# Patient Record
Sex: FEMALE | Race: BLACK OR AFRICAN AMERICAN | NOT HISPANIC OR LATINO | Employment: OTHER | URBAN - METROPOLITAN AREA
[De-identification: names, ages, dates, MRNs, and addresses within clinical notes are randomized per-mention and may not be internally consistent; named-entity substitution may affect disease eponyms.]

---

## 2017-11-03 ENCOUNTER — OFFICE VISIT (OUTPATIENT)
Dept: CARDIOLOGY | Facility: CLINIC | Age: 82
End: 2017-11-03
Payer: MEDICARE

## 2017-11-03 VITALS
WEIGHT: 122 LBS | SYSTOLIC BLOOD PRESSURE: 122 MMHG | HEIGHT: 60 IN | HEART RATE: 62 BPM | DIASTOLIC BLOOD PRESSURE: 72 MMHG | BODY MASS INDEX: 23.95 KG/M2

## 2017-11-03 DIAGNOSIS — I10 ESSENTIAL HYPERTENSION: Primary | ICD-10-CM

## 2017-11-03 DIAGNOSIS — E11.9 TYPE 2 DIABETES MELLITUS WITHOUT COMPLICATION, WITHOUT LONG-TERM CURRENT USE OF INSULIN: ICD-10-CM

## 2017-11-03 DIAGNOSIS — E78.5 HYPERLIPIDEMIA, UNSPECIFIED HYPERLIPIDEMIA TYPE: ICD-10-CM

## 2017-11-03 PROCEDURE — 99213 OFFICE O/P EST LOW 20 MIN: CPT | Mod: S$GLB,,, | Performed by: INTERNAL MEDICINE

## 2017-11-03 PROCEDURE — 93000 ELECTROCARDIOGRAM COMPLETE: CPT | Mod: S$GLB,,, | Performed by: INTERNAL MEDICINE

## 2017-11-03 RX ORDER — GLIPIZIDE 5 MG/1
5 TABLET, FILM COATED, EXTENDED RELEASE ORAL DAILY
Qty: 90 TABLET | Refills: 3 | Status: SHIPPED | OUTPATIENT
Start: 2017-11-03 | End: 2018-01-01

## 2017-11-03 RX ORDER — CARVEDILOL 25 MG/1
25 TABLET ORAL 2 TIMES DAILY WITH MEALS
Qty: 180 TABLET | Refills: 3 | Status: ON HOLD | OUTPATIENT
Start: 2017-11-03 | End: 2019-01-01 | Stop reason: HOSPADM

## 2017-11-03 NOTE — PROGRESS NOTES
Subjective:   Patient ID:  Deisi Cheung is a 82 y.o. female     Chief Complaint   Patient presents with    Follow-up     Hypertension       HPI: Dr Mena is PCP.  Pt is active and feels well. Sugars are good.     Review of Systems   Cardiovascular: Negative for chest pain, claudication, dyspnea on exertion, irregular heartbeat, leg swelling, near-syncope, orthopnea, palpitations and syncope.   No recurrent falls      Objective:   Physical Exam   Constitutional: She is oriented to person, place, and time. She appears well-developed and well-nourished. No distress.   HENT:   Head: Normocephalic.   Eyes: No scleral icterus.   Neck: No JVD present.   Cardiovascular: Normal rate and regular rhythm.  Exam reveals no gallop and no friction rub.    Murmur (Ii/VI systolic ) heard.  Pulmonary/Chest: Effort normal and breath sounds normal. No stridor.   Musculoskeletal: She exhibits no edema.   Neurological: She is alert and oriented to person, place, and time.   Skin: Skin is warm and dry. She is not diaphoretic.   Psychiatric: She has a normal mood and affect. Her behavior is normal. Judgment and thought content normal.   Vitals reviewed.    ECG: NSR, WNL  Assessment:     1. Essential hypertension    2. Hyperlipidemia, unspecified hyperlipidemia type    3. Type 2 diabetes mellitus without complication, without long-term current use of insulin        Plan:   Continue same medical regimen.  Return to clinic in 6 months with ECG.  F/u with Dr Mena

## 2018-01-01 ENCOUNTER — PATIENT MESSAGE (OUTPATIENT)
Dept: INTERNAL MEDICINE | Facility: CLINIC | Age: 83
End: 2018-01-01

## 2018-01-01 ENCOUNTER — HOSPITAL ENCOUNTER (OUTPATIENT)
Dept: RADIOLOGY | Facility: OTHER | Age: 83
Discharge: HOME | End: 2018-11-23
Attending: FAMILY MEDICINE
Payer: MEDICARE

## 2018-01-01 ENCOUNTER — TELEPHONE (OUTPATIENT)
Dept: INTERNAL MEDICINE | Facility: CLINIC | Age: 83
End: 2018-01-01

## 2018-01-01 ENCOUNTER — PATIENT MESSAGE (OUTPATIENT)
Dept: NEUROLOGY | Facility: CLINIC | Age: 83
End: 2018-01-01

## 2018-01-01 ENCOUNTER — OFFICE VISIT (OUTPATIENT)
Dept: ORTHOPEDICS | Facility: CLINIC | Age: 83
End: 2018-01-01
Attending: FAMILY MEDICINE
Payer: MEDICARE

## 2018-01-01 ENCOUNTER — APPOINTMENT (OUTPATIENT)
Dept: LAB | Facility: OTHER | Age: 83
End: 2018-01-01
Attending: FAMILY MEDICINE
Payer: MEDICARE

## 2018-01-01 ENCOUNTER — OFFICE VISIT (OUTPATIENT)
Dept: INTERNAL MEDICINE | Facility: CLINIC | Age: 83
End: 2018-01-01
Attending: FAMILY MEDICINE
Payer: MEDICARE

## 2018-01-01 ENCOUNTER — OUTPATIENT CASE MANAGEMENT (OUTPATIENT)
Dept: ADMINISTRATIVE | Facility: OTHER | Age: 83
End: 2018-01-01

## 2018-01-01 ENCOUNTER — LAB VISIT (OUTPATIENT)
Dept: LAB | Facility: HOSPITAL | Age: 83
End: 2018-01-01
Attending: FAMILY MEDICINE
Payer: MEDICARE

## 2018-01-01 ENCOUNTER — HOSPITAL ENCOUNTER (INPATIENT)
Facility: HOSPITAL | Age: 83
LOS: 32 days | Discharge: NURSING, REHAB OR LONG-TERM CARE | DRG: 948 | End: 2019-01-15
Attending: EMERGENCY MEDICINE | Admitting: INTERNAL MEDICINE
Payer: MEDICARE

## 2018-01-01 VITALS
WEIGHT: 123 LBS | BODY MASS INDEX: 24.15 KG/M2 | HEART RATE: 79 BPM | DIASTOLIC BLOOD PRESSURE: 82 MMHG | HEIGHT: 60 IN | SYSTOLIC BLOOD PRESSURE: 159 MMHG

## 2018-01-01 VITALS
DIASTOLIC BLOOD PRESSURE: 80 MMHG | WEIGHT: 123 LBS | HEART RATE: 76 BPM | OXYGEN SATURATION: 97 % | BODY MASS INDEX: 24.15 KG/M2 | HEIGHT: 60 IN | SYSTOLIC BLOOD PRESSURE: 142 MMHG

## 2018-01-01 DIAGNOSIS — M79.641 RIGHT HAND PAIN: ICD-10-CM

## 2018-01-01 DIAGNOSIS — I10 ESSENTIAL HYPERTENSION: ICD-10-CM

## 2018-01-01 DIAGNOSIS — R73.9 HYPERGLYCEMIA WITHOUT KETOSIS: ICD-10-CM

## 2018-01-01 DIAGNOSIS — M17.11 ARTHRITIS OF RIGHT KNEE: ICD-10-CM

## 2018-01-01 DIAGNOSIS — E11.9 TYPE 2 DIABETES MELLITUS WITHOUT COMPLICATION, WITHOUT LONG-TERM CURRENT USE OF INSULIN: Primary | ICD-10-CM

## 2018-01-01 DIAGNOSIS — R29.898 WEAKNESS OF BOTH LOWER EXTREMITIES: ICD-10-CM

## 2018-01-01 DIAGNOSIS — Z12.11 SCREEN FOR COLON CANCER: Primary | ICD-10-CM

## 2018-01-01 DIAGNOSIS — Z78.0 ASYMPTOMATIC POSTMENOPAUSAL STATE: ICD-10-CM

## 2018-01-01 DIAGNOSIS — B35.1 ONYCHOMYCOSIS: ICD-10-CM

## 2018-01-01 DIAGNOSIS — E11.9 TYPE 2 DIABETES MELLITUS WITHOUT COMPLICATION, WITHOUT LONG-TERM CURRENT USE OF INSULIN: ICD-10-CM

## 2018-01-01 DIAGNOSIS — D64.9 NORMOCYTIC ANEMIA: Primary | ICD-10-CM

## 2018-01-01 DIAGNOSIS — M25.511 CHRONIC RIGHT SHOULDER PAIN: ICD-10-CM

## 2018-01-01 DIAGNOSIS — D52.0 DIETARY FOLATE DEFICIENCY ANEMIA: ICD-10-CM

## 2018-01-01 DIAGNOSIS — E78.5 HYPERLIPIDEMIA, UNSPECIFIED HYPERLIPIDEMIA TYPE: ICD-10-CM

## 2018-01-01 DIAGNOSIS — K59.00 CONSTIPATION, UNSPECIFIED CONSTIPATION TYPE: ICD-10-CM

## 2018-01-01 DIAGNOSIS — Z12.11 SCREEN FOR COLON CANCER: ICD-10-CM

## 2018-01-01 DIAGNOSIS — E78.00 PURE HYPERCHOLESTEROLEMIA: ICD-10-CM

## 2018-01-01 DIAGNOSIS — R53.1 WEAKNESS: ICD-10-CM

## 2018-01-01 DIAGNOSIS — R60.0 EDEMA OF UPPER EXTREMITY: ICD-10-CM

## 2018-01-01 DIAGNOSIS — R26.9 GAIT DISTURBANCE: ICD-10-CM

## 2018-01-01 DIAGNOSIS — Z74.09 IMMOBILITY: ICD-10-CM

## 2018-01-01 DIAGNOSIS — R53.81 DEBILITY: ICD-10-CM

## 2018-01-01 DIAGNOSIS — G89.29 CHRONIC RIGHT SHOULDER PAIN: ICD-10-CM

## 2018-01-01 DIAGNOSIS — D51.9 ANEMIA DUE TO VITAMIN B12 DEFICIENCY, UNSPECIFIED B12 DEFICIENCY TYPE: ICD-10-CM

## 2018-01-01 DIAGNOSIS — M62.421 CONTRACTURE OF MUSCLE, RIGHT UPPER ARM: ICD-10-CM

## 2018-01-01 DIAGNOSIS — R29.898 WEAKNESS OF RIGHT UPPER EXTREMITY: Primary | ICD-10-CM

## 2018-01-01 DIAGNOSIS — R22.31 LOCALIZED SWELLING ON RIGHT HAND: Primary | ICD-10-CM

## 2018-01-01 LAB
ALBUMIN SERPL BCP-MCNC: 3 G/DL
ALP SERPL-CCNC: 77 U/L
ALT SERPL W/O P-5'-P-CCNC: 7 U/L
ANION GAP SERPL CALC-SCNC: 10 MMOL/L
ANION GAP SERPL CALC-SCNC: 10 MMOL/L
ANION GAP SERPL CALC-SCNC: 13 MMOL/L
ANION GAP SERPL CALC-SCNC: 7 MMOL/L
ANION GAP SERPL CALC-SCNC: 8 MMOL/L
ANION GAP SERPL CALC-SCNC: 8 MMOL/L
ANION GAP SERPL CALC-SCNC: 9 MMOL/L
AST SERPL-CCNC: 11 U/L
BASOPHILS # BLD AUTO: 0.03 K/UL
BASOPHILS # BLD AUTO: 0.04 K/UL
BASOPHILS # BLD AUTO: 0.04 K/UL
BASOPHILS # BLD AUTO: 0.05 K/UL
BASOPHILS # BLD AUTO: 0.06 K/UL
BASOPHILS NFR BLD: 0.4 %
BASOPHILS NFR BLD: 0.5 %
BASOPHILS NFR BLD: 0.5 %
BASOPHILS NFR BLD: 0.6 %
BASOPHILS NFR BLD: 0.8 %
BILIRUB SERPL-MCNC: 0.3 MG/DL
BILIRUB UR QL STRIP: NEGATIVE
BUN SERPL-MCNC: 13 MG/DL
BUN SERPL-MCNC: 14 MG/DL
BUN SERPL-MCNC: 15 MG/DL
BUN SERPL-MCNC: 16 MG/DL
BUN SERPL-MCNC: 17 MG/DL
BUN SERPL-MCNC: 19 MG/DL
BUN SERPL-MCNC: 21 MG/DL
CALCIUM SERPL-MCNC: 8.6 MG/DL
CALCIUM SERPL-MCNC: 8.8 MG/DL
CALCIUM SERPL-MCNC: 8.9 MG/DL
CALCIUM SERPL-MCNC: 8.9 MG/DL
CALCIUM SERPL-MCNC: 9 MG/DL
CALCIUM SERPL-MCNC: 9 MG/DL
CALCIUM SERPL-MCNC: 9.1 MG/DL
CALCIUM SERPL-MCNC: 9.2 MG/DL
CALCIUM SERPL-MCNC: 9.2 MG/DL
CHLORIDE SERPL-SCNC: 100 MMOL/L
CHLORIDE SERPL-SCNC: 101 MMOL/L
CHLORIDE SERPL-SCNC: 102 MMOL/L
CHLORIDE SERPL-SCNC: 103 MMOL/L
CHLORIDE SERPL-SCNC: 97 MMOL/L
CK SERPL-CCNC: 61 U/L
CLARITY UR REFRACT.AUTO: CLEAR
CO2 SERPL-SCNC: 24 MMOL/L
CO2 SERPL-SCNC: 24 MMOL/L
CO2 SERPL-SCNC: 25 MMOL/L
CO2 SERPL-SCNC: 26 MMOL/L
CO2 SERPL-SCNC: 26 MMOL/L
CO2 SERPL-SCNC: 27 MMOL/L
CO2 SERPL-SCNC: 28 MMOL/L
COLOR UR AUTO: YELLOW
CREAT SERPL-MCNC: 0.5 MG/DL
CREAT SERPL-MCNC: 0.6 MG/DL
CREAT SERPL-MCNC: 0.7 MG/DL
CRP SERPL-MCNC: 12.3 MG/L
DIFFERENTIAL METHOD: ABNORMAL
EOSINOPHIL # BLD AUTO: 0.1 K/UL
EOSINOPHIL # BLD AUTO: 0.2 K/UL
EOSINOPHIL NFR BLD: 1.8 %
EOSINOPHIL NFR BLD: 2.1 %
EOSINOPHIL NFR BLD: 2.3 %
EOSINOPHIL NFR BLD: 2.4 %
EOSINOPHIL NFR BLD: 2.5 %
EOSINOPHIL NFR BLD: 2.5 %
EOSINOPHIL NFR BLD: 2.6 %
EOSINOPHIL NFR BLD: 2.7 %
EOSINOPHIL NFR BLD: 3 %
ERYTHROCYTE [DISTWIDTH] IN BLOOD BY AUTOMATED COUNT: 15.7 %
ERYTHROCYTE [DISTWIDTH] IN BLOOD BY AUTOMATED COUNT: 15.9 %
ERYTHROCYTE [DISTWIDTH] IN BLOOD BY AUTOMATED COUNT: 16.1 %
EST. GFR  (AFRICAN AMERICAN): >60 ML/MIN/1.73 M^2
EST. GFR  (NON AFRICAN AMERICAN): >60 ML/MIN/1.73 M^2
FOLATE SERPL-MCNC: 10.6 NG/ML
GLUCOSE SERPL-MCNC: 115 MG/DL
GLUCOSE SERPL-MCNC: 124 MG/DL
GLUCOSE SERPL-MCNC: 134 MG/DL
GLUCOSE SERPL-MCNC: 135 MG/DL
GLUCOSE SERPL-MCNC: 137 MG/DL
GLUCOSE SERPL-MCNC: 143 MG/DL
GLUCOSE SERPL-MCNC: 165 MG/DL
GLUCOSE SERPL-MCNC: 193 MG/DL
GLUCOSE SERPL-MCNC: 84 MG/DL
GLUCOSE UR QL STRIP: NEGATIVE
HCT VFR BLD AUTO: 31.2 %
HCT VFR BLD AUTO: 31.3 %
HCT VFR BLD AUTO: 31.5 %
HCT VFR BLD AUTO: 31.6 %
HCT VFR BLD AUTO: 32.2 %
HCT VFR BLD AUTO: 33.5 %
HCT VFR BLD AUTO: 33.8 %
HCT VFR BLD AUTO: 34.3 %
HCT VFR BLD AUTO: 34.8 %
HEMOCCULT STL QL IA: NEGATIVE
HGB BLD-MCNC: 10 G/DL
HGB BLD-MCNC: 10.1 G/DL
HGB BLD-MCNC: 10.3 G/DL
HGB BLD-MCNC: 10.4 G/DL
HGB BLD-MCNC: 10.8 G/DL
HGB BLD-MCNC: 10.9 G/DL
HGB BLD-MCNC: 9.8 G/DL
HGB BLD-MCNC: 9.9 G/DL
HGB BLD-MCNC: 9.9 G/DL
HGB UR QL STRIP: NEGATIVE
IMM GRANULOCYTES # BLD AUTO: 0.04 K/UL
IMM GRANULOCYTES # BLD AUTO: 0.05 K/UL
IMM GRANULOCYTES # BLD AUTO: 0.06 K/UL
IMM GRANULOCYTES # BLD AUTO: 0.1 K/UL
IMM GRANULOCYTES NFR BLD AUTO: 0.5 %
IMM GRANULOCYTES NFR BLD AUTO: 0.6 %
IMM GRANULOCYTES NFR BLD AUTO: 0.7 %
IMM GRANULOCYTES NFR BLD AUTO: 0.7 %
IMM GRANULOCYTES NFR BLD AUTO: 0.8 %
IMM GRANULOCYTES NFR BLD AUTO: 0.8 %
IMM GRANULOCYTES NFR BLD AUTO: 1.3 %
KETONES UR QL STRIP: NEGATIVE
LEUKOCYTE ESTERASE UR QL STRIP: NEGATIVE
LYMPHOCYTES # BLD AUTO: 2 K/UL
LYMPHOCYTES # BLD AUTO: 2.4 K/UL
LYMPHOCYTES # BLD AUTO: 2.5 K/UL
LYMPHOCYTES # BLD AUTO: 2.5 K/UL
LYMPHOCYTES # BLD AUTO: 2.6 K/UL
LYMPHOCYTES # BLD AUTO: 2.7 K/UL
LYMPHOCYTES # BLD AUTO: 2.8 K/UL
LYMPHOCYTES NFR BLD: 25.7 %
LYMPHOCYTES NFR BLD: 29.5 %
LYMPHOCYTES NFR BLD: 30.9 %
LYMPHOCYTES NFR BLD: 31.9 %
LYMPHOCYTES NFR BLD: 32.8 %
LYMPHOCYTES NFR BLD: 33.9 %
LYMPHOCYTES NFR BLD: 34 %
LYMPHOCYTES NFR BLD: 35.4 %
LYMPHOCYTES NFR BLD: 35.4 %
MAGNESIUM SERPL-MCNC: 1.6 MG/DL
MAGNESIUM SERPL-MCNC: 1.6 MG/DL
MAGNESIUM SERPL-MCNC: 1.7 MG/DL
MAGNESIUM SERPL-MCNC: 1.7 MG/DL
MAGNESIUM SERPL-MCNC: 1.8 MG/DL
MCH RBC QN AUTO: 26.5 PG
MCH RBC QN AUTO: 26.7 PG
MCH RBC QN AUTO: 27 PG
MCH RBC QN AUTO: 27.1 PG
MCH RBC QN AUTO: 27.2 PG
MCH RBC QN AUTO: 27.3 PG
MCH RBC QN AUTO: 27.5 PG
MCH RBC QN AUTO: 27.5 PG
MCH RBC QN AUTO: 27.9 PG
MCHC RBC AUTO-ENTMCNC: 30.3 G/DL
MCHC RBC AUTO-ENTMCNC: 30.7 G/DL
MCHC RBC AUTO-ENTMCNC: 30.7 G/DL
MCHC RBC AUTO-ENTMCNC: 31.3 G/DL
MCHC RBC AUTO-ENTMCNC: 31.3 G/DL
MCHC RBC AUTO-ENTMCNC: 31.4 G/DL
MCHC RBC AUTO-ENTMCNC: 31.9 G/DL
MCHC RBC AUTO-ENTMCNC: 32 G/DL
MCHC RBC AUTO-ENTMCNC: 32.1 G/DL
MCV RBC AUTO: 84 FL
MCV RBC AUTO: 85 FL
MCV RBC AUTO: 86 FL
MCV RBC AUTO: 86 FL
MCV RBC AUTO: 87 FL
MCV RBC AUTO: 88 FL
MCV RBC AUTO: 89 FL
METHYLMALONATE SERPL-SCNC: 0.15 UMOL/L
MONOCYTES # BLD AUTO: 0.6 K/UL
MONOCYTES # BLD AUTO: 0.6 K/UL
MONOCYTES # BLD AUTO: 0.7 K/UL
MONOCYTES # BLD AUTO: 0.8 K/UL
MONOCYTES NFR BLD: 10 %
MONOCYTES NFR BLD: 10.3 %
MONOCYTES NFR BLD: 10.7 %
MONOCYTES NFR BLD: 7.9 %
MONOCYTES NFR BLD: 9.1 %
MONOCYTES NFR BLD: 9.6 %
MONOCYTES NFR BLD: 9.6 %
MONOCYTES NFR BLD: 9.8 %
MONOCYTES NFR BLD: 9.8 %
NEUTROPHILS # BLD AUTO: 3.4 K/UL
NEUTROPHILS # BLD AUTO: 3.7 K/UL
NEUTROPHILS # BLD AUTO: 4 K/UL
NEUTROPHILS # BLD AUTO: 4 K/UL
NEUTROPHILS # BLD AUTO: 4.1 K/UL
NEUTROPHILS # BLD AUTO: 4.5 K/UL
NEUTROPHILS # BLD AUTO: 4.5 K/UL
NEUTROPHILS # BLD AUTO: 4.7 K/UL
NEUTROPHILS # BLD AUTO: 4.9 K/UL
NEUTROPHILS NFR BLD: 50 %
NEUTROPHILS NFR BLD: 51.5 %
NEUTROPHILS NFR BLD: 52.2 %
NEUTROPHILS NFR BLD: 52.7 %
NEUTROPHILS NFR BLD: 52.8 %
NEUTROPHILS NFR BLD: 54.6 %
NEUTROPHILS NFR BLD: 55.6 %
NEUTROPHILS NFR BLD: 57.7 %
NEUTROPHILS NFR BLD: 63.7 %
NITRITE UR QL STRIP: NEGATIVE
NRBC BLD-RTO: 0 /100 WBC
PH UR STRIP: 7 [PH] (ref 5–8)
PHOSPHATE SERPL-MCNC: 2.8 MG/DL
PHOSPHATE SERPL-MCNC: 2.9 MG/DL
PHOSPHATE SERPL-MCNC: 3.1 MG/DL
PHOSPHATE SERPL-MCNC: 3.2 MG/DL
PHOSPHATE SERPL-MCNC: 3.6 MG/DL
PLATELET # BLD AUTO: 330 K/UL
PLATELET # BLD AUTO: 357 K/UL
PLATELET # BLD AUTO: 363 K/UL
PLATELET # BLD AUTO: 365 K/UL
PLATELET # BLD AUTO: 367 K/UL
PLATELET # BLD AUTO: 369 K/UL
PLATELET # BLD AUTO: 378 K/UL
PLATELET # BLD AUTO: 379 K/UL
PLATELET # BLD AUTO: 404 K/UL
PMV BLD AUTO: 10.1 FL
PMV BLD AUTO: 10.1 FL
PMV BLD AUTO: 10.3 FL
PMV BLD AUTO: 10.6 FL
PMV BLD AUTO: 10.7 FL
PMV BLD AUTO: 11 FL
POCT GLUCOSE: 105 MG/DL (ref 70–110)
POCT GLUCOSE: 106 MG/DL (ref 70–110)
POCT GLUCOSE: 109 MG/DL (ref 70–110)
POCT GLUCOSE: 112 MG/DL (ref 70–110)
POCT GLUCOSE: 116 MG/DL (ref 70–110)
POCT GLUCOSE: 118 MG/DL (ref 70–110)
POCT GLUCOSE: 122 MG/DL (ref 70–110)
POCT GLUCOSE: 123 MG/DL (ref 70–110)
POCT GLUCOSE: 126 MG/DL (ref 70–110)
POCT GLUCOSE: 128 MG/DL (ref 70–110)
POCT GLUCOSE: 129 MG/DL (ref 70–110)
POCT GLUCOSE: 132 MG/DL (ref 70–110)
POCT GLUCOSE: 132 MG/DL (ref 70–110)
POCT GLUCOSE: 133 MG/DL (ref 70–110)
POCT GLUCOSE: 134 MG/DL (ref 70–110)
POCT GLUCOSE: 135 MG/DL (ref 70–110)
POCT GLUCOSE: 138 MG/DL (ref 70–110)
POCT GLUCOSE: 139 MG/DL (ref 70–110)
POCT GLUCOSE: 140 MG/DL (ref 70–110)
POCT GLUCOSE: 144 MG/DL (ref 70–110)
POCT GLUCOSE: 147 MG/DL (ref 70–110)
POCT GLUCOSE: 147 MG/DL (ref 70–110)
POCT GLUCOSE: 148 MG/DL (ref 70–110)
POCT GLUCOSE: 150 MG/DL (ref 70–110)
POCT GLUCOSE: 151 MG/DL (ref 70–110)
POCT GLUCOSE: 151 MG/DL (ref 70–110)
POCT GLUCOSE: 152 MG/DL (ref 70–110)
POCT GLUCOSE: 154 MG/DL (ref 70–110)
POCT GLUCOSE: 155 MG/DL (ref 70–110)
POCT GLUCOSE: 158 MG/DL (ref 70–110)
POCT GLUCOSE: 162 MG/DL (ref 70–110)
POCT GLUCOSE: 163 MG/DL (ref 70–110)
POCT GLUCOSE: 167 MG/DL (ref 70–110)
POCT GLUCOSE: 167 MG/DL (ref 70–110)
POCT GLUCOSE: 172 MG/DL (ref 70–110)
POCT GLUCOSE: 175 MG/DL (ref 70–110)
POCT GLUCOSE: 178 MG/DL (ref 70–110)
POCT GLUCOSE: 180 MG/DL (ref 70–110)
POCT GLUCOSE: 184 MG/DL (ref 70–110)
POCT GLUCOSE: 188 MG/DL (ref 70–110)
POCT GLUCOSE: 200 MG/DL (ref 70–110)
POCT GLUCOSE: 200 MG/DL (ref 70–110)
POCT GLUCOSE: 202 MG/DL (ref 70–110)
POCT GLUCOSE: 207 MG/DL (ref 70–110)
POCT GLUCOSE: 208 MG/DL (ref 70–110)
POCT GLUCOSE: 212 MG/DL (ref 70–110)
POCT GLUCOSE: 215 MG/DL (ref 70–110)
POCT GLUCOSE: 221 MG/DL (ref 70–110)
POCT GLUCOSE: 221 MG/DL (ref 70–110)
POCT GLUCOSE: 230 MG/DL (ref 70–110)
POCT GLUCOSE: 234 MG/DL (ref 70–110)
POCT GLUCOSE: 244 MG/DL (ref 70–110)
POCT GLUCOSE: 250 MG/DL (ref 70–110)
POCT GLUCOSE: 252 MG/DL (ref 70–110)
POCT GLUCOSE: 259 MG/DL (ref 70–110)
POCT GLUCOSE: 262 MG/DL (ref 70–110)
POCT GLUCOSE: 269 MG/DL (ref 70–110)
POCT GLUCOSE: 314 MG/DL (ref 70–110)
POCT GLUCOSE: 326 MG/DL (ref 70–110)
POCT GLUCOSE: 59 MG/DL (ref 70–110)
POCT GLUCOSE: 61 MG/DL (ref 70–110)
POCT GLUCOSE: 97 MG/DL (ref 70–110)
POCT GLUCOSE: 99 MG/DL (ref 70–110)
POTASSIUM SERPL-SCNC: 3.3 MMOL/L
POTASSIUM SERPL-SCNC: 3.6 MMOL/L
POTASSIUM SERPL-SCNC: 3.7 MMOL/L
POTASSIUM SERPL-SCNC: 3.8 MMOL/L
POTASSIUM SERPL-SCNC: 3.9 MMOL/L
POTASSIUM SERPL-SCNC: 4.2 MMOL/L
PROT SERPL-MCNC: 6.8 G/DL
PROT UR QL STRIP: NEGATIVE
RBC # BLD AUTO: 3.6 M/UL
RBC # BLD AUTO: 3.61 M/UL
RBC # BLD AUTO: 3.62 M/UL
RBC # BLD AUTO: 3.62 M/UL
RBC # BLD AUTO: 3.71 M/UL
RBC # BLD AUTO: 3.88 M/UL
RBC # BLD AUTO: 3.9 M/UL
RBC # BLD AUTO: 3.97 M/UL
RBC # BLD AUTO: 3.97 M/UL
SODIUM SERPL-SCNC: 134 MMOL/L
SODIUM SERPL-SCNC: 135 MMOL/L
SODIUM SERPL-SCNC: 136 MMOL/L
SODIUM SERPL-SCNC: 137 MMOL/L
SP GR UR STRIP: 1.01 (ref 1–1.03)
TB INDURATION 48 - 72 HR READ: 0 MM
URN SPEC COLLECT METH UR: NORMAL
VIT B12 SERPL-MCNC: 1062 PG/ML
WBC # BLD AUTO: 6.69 K/UL
WBC # BLD AUTO: 7.01 K/UL
WBC # BLD AUTO: 7.53 K/UL
WBC # BLD AUTO: 7.64 K/UL
WBC # BLD AUTO: 7.74 K/UL
WBC # BLD AUTO: 7.93 K/UL
WBC # BLD AUTO: 8.09 K/UL
WBC # BLD AUTO: 8.21 K/UL
WBC # BLD AUTO: 8.32 K/UL

## 2018-01-01 PROCEDURE — 86140 C-REACTIVE PROTEIN: CPT

## 2018-01-01 PROCEDURE — 36415 COLL VENOUS BLD VENIPUNCTURE: CPT

## 2018-01-01 PROCEDURE — G8989 SELF CARE D/C STATUS: HCPCS | Mod: CM

## 2018-01-01 PROCEDURE — 73030 X-RAY EXAM OF SHOULDER: CPT | Mod: 26,RT,, | Performed by: INTERNAL MEDICINE

## 2018-01-01 PROCEDURE — 84100 ASSAY OF PHOSPHORUS: CPT

## 2018-01-01 PROCEDURE — 3079F DIAST BP 80-89 MM HG: CPT | Mod: CPTII,S$GLB,, | Performed by: PHYSICIAN ASSISTANT

## 2018-01-01 PROCEDURE — G0378 HOSPITAL OBSERVATION PER HR: HCPCS

## 2018-01-01 PROCEDURE — 63600175 PHARM REV CODE 636 W HCPCS: Performed by: INTERNAL MEDICINE

## 2018-01-01 PROCEDURE — 99225 PR SUBSEQUENT OBSERVATION CARE,LEVEL II: CPT | Mod: ,,, | Performed by: HOSPITALIST

## 2018-01-01 PROCEDURE — 11000001 HC ACUTE MED/SURG PRIVATE ROOM

## 2018-01-01 PROCEDURE — 85025 COMPLETE CBC W/AUTO DIFF WBC: CPT

## 2018-01-01 PROCEDURE — 77080 DXA BONE DENSITY AXIAL: CPT | Mod: TC

## 2018-01-01 PROCEDURE — 82607 VITAMIN B-12: CPT

## 2018-01-01 PROCEDURE — 25000003 PHARM REV CODE 250: Performed by: INTERNAL MEDICINE

## 2018-01-01 PROCEDURE — S5571 INSULIN DISPOS PEN 3 ML: HCPCS | Performed by: INTERNAL MEDICINE

## 2018-01-01 PROCEDURE — S5571 INSULIN DISPOS PEN 3 ML: HCPCS | Performed by: PHYSICIAN ASSISTANT

## 2018-01-01 PROCEDURE — G8978 MOBILITY CURRENT STATUS: HCPCS | Mod: CL

## 2018-01-01 PROCEDURE — 82962 GLUCOSE BLOOD TEST: CPT

## 2018-01-01 PROCEDURE — 25000003 PHARM REV CODE 250: Performed by: PHYSICIAN ASSISTANT

## 2018-01-01 PROCEDURE — 99225 PR SUBSEQUENT OBSERVATION CARE,LEVEL II: ICD-10-PCS | Mod: ,,, | Performed by: HOSPITALIST

## 2018-01-01 PROCEDURE — 99226 PR SUBSEQUENT OBSERVATION CARE,LEVEL III: CPT | Mod: ,,, | Performed by: PHYSICIAN ASSISTANT

## 2018-01-01 PROCEDURE — 80048 BASIC METABOLIC PNL TOTAL CA: CPT

## 2018-01-01 PROCEDURE — 86580 TB INTRADERMAL TEST: CPT | Performed by: INTERNAL MEDICINE

## 2018-01-01 PROCEDURE — 73130 X-RAY EXAM OF HAND: CPT | Mod: 26,RT,, | Performed by: RADIOLOGY

## 2018-01-01 PROCEDURE — 99214 PR OFFICE/OUTPT VISIT, EST, LEVL IV, 30-39 MIN: ICD-10-PCS | Mod: ,,, | Performed by: PSYCHIATRY & NEUROLOGY

## 2018-01-01 PROCEDURE — G8980 MOBILITY D/C STATUS: HCPCS | Mod: CL

## 2018-01-01 PROCEDURE — G8987 SELF CARE CURRENT STATUS: HCPCS | Mod: CL

## 2018-01-01 PROCEDURE — 25500020 PHARM REV CODE 255: Performed by: INTERNAL MEDICINE

## 2018-01-01 PROCEDURE — 99225 PR SUBSEQUENT OBSERVATION CARE,LEVEL II: ICD-10-PCS | Mod: ,,, | Performed by: INTERNAL MEDICINE

## 2018-01-01 PROCEDURE — 97530 THERAPEUTIC ACTIVITIES: CPT

## 2018-01-01 PROCEDURE — 99214 OFFICE O/P EST MOD 30 MIN: CPT | Mod: ,,, | Performed by: PSYCHIATRY & NEUROLOGY

## 2018-01-01 PROCEDURE — 99284 EMERGENCY DEPT VISIT MOD MDM: CPT | Mod: ,,, | Performed by: EMERGENCY MEDICINE

## 2018-01-01 PROCEDURE — 99224 PR SUBSEQUENT OBSERVATION CARE,LEVEL I: ICD-10-PCS | Mod: ,,, | Performed by: INTERNAL MEDICINE

## 2018-01-01 PROCEDURE — 3288F FALL RISK ASSESSMENT DOCD: CPT | Mod: CPTII,S$GLB,, | Performed by: FAMILY MEDICINE

## 2018-01-01 PROCEDURE — 99224 PR SUBSEQUENT OBSERVATION CARE,LEVEL I: CPT | Mod: ,,, | Performed by: INTERNAL MEDICINE

## 2018-01-01 PROCEDURE — 97535 SELF CARE MNGMENT TRAINING: CPT

## 2018-01-01 PROCEDURE — 73130 X-RAY EXAM OF HAND: CPT | Mod: TC,FY,RT

## 2018-01-01 PROCEDURE — G8979 MOBILITY GOAL STATUS: HCPCS | Mod: CL

## 2018-01-01 PROCEDURE — 86580 TB INTRADERMAL TEST: CPT | Performed by: HOSPITALIST

## 2018-01-01 PROCEDURE — 73030 X-RAY EXAM OF SHOULDER: CPT | Mod: TC,FY,RT

## 2018-01-01 PROCEDURE — 99226 PR SUBSEQUENT OBSERVATION CARE,LEVEL III: ICD-10-PCS | Mod: ,,, | Performed by: PHYSICIAN ASSISTANT

## 2018-01-01 PROCEDURE — 99284 PR EMERGENCY DEPT VISIT,LEVEL IV: ICD-10-PCS | Mod: ,,, | Performed by: EMERGENCY MEDICINE

## 2018-01-01 PROCEDURE — 99222 PR INITIAL HOSPITAL CARE,LEVL II: ICD-10-PCS | Mod: ,,, | Performed by: NURSE PRACTITIONER

## 2018-01-01 PROCEDURE — 97166 OT EVAL MOD COMPLEX 45 MIN: CPT

## 2018-01-01 PROCEDURE — 83921 ORGANIC ACID SINGLE QUANT: CPT

## 2018-01-01 PROCEDURE — 99232 SBSQ HOSP IP/OBS MODERATE 35: CPT | Mod: ,,, | Performed by: NURSE PRACTITIONER

## 2018-01-01 PROCEDURE — 83735 ASSAY OF MAGNESIUM: CPT

## 2018-01-01 PROCEDURE — G8988 SELF CARE GOAL STATUS: HCPCS | Mod: CK

## 2018-01-01 PROCEDURE — 63600175 PHARM REV CODE 636 W HCPCS: Performed by: PHYSICIAN ASSISTANT

## 2018-01-01 PROCEDURE — 99219 PR INITIAL OBSERVATION CARE,LEVL II: CPT | Mod: ,,, | Performed by: INTERNAL MEDICINE

## 2018-01-01 PROCEDURE — 82274 ASSAY TEST FOR BLOOD FECAL: CPT

## 2018-01-01 PROCEDURE — 1100F PTFALLS ASSESS-DOCD GE2>/YR: CPT | Mod: CPTII,S$GLB,, | Performed by: FAMILY MEDICINE

## 2018-01-01 PROCEDURE — 81003 URINALYSIS AUTO W/O SCOPE: CPT

## 2018-01-01 PROCEDURE — 97164 PT RE-EVAL EST PLAN CARE: CPT

## 2018-01-01 PROCEDURE — 99222 1ST HOSP IP/OBS MODERATE 55: CPT | Mod: ,,, | Performed by: NURSE PRACTITIONER

## 2018-01-01 PROCEDURE — 99204 OFFICE O/P NEW MOD 45 MIN: CPT | Mod: S$GLB,,, | Performed by: PHYSICIAN ASSISTANT

## 2018-01-01 PROCEDURE — 99225 PR SUBSEQUENT OBSERVATION CARE,LEVEL II: CPT | Mod: ,,, | Performed by: INTERNAL MEDICINE

## 2018-01-01 PROCEDURE — 99232 PR SUBSEQUENT HOSPITAL CARE,LEVL II: ICD-10-PCS | Mod: ,,, | Performed by: NURSE PRACTITIONER

## 2018-01-01 PROCEDURE — 99219 PR INITIAL OBSERVATION CARE,LEVL II: ICD-10-PCS | Mod: ,,, | Performed by: INTERNAL MEDICINE

## 2018-01-01 PROCEDURE — 99999 PR PBB SHADOW E&M-EST. PATIENT-LVL III: CPT | Mod: PBBFAC,,, | Performed by: PHYSICIAN ASSISTANT

## 2018-01-01 PROCEDURE — 80053 COMPREHEN METABOLIC PANEL: CPT

## 2018-01-01 PROCEDURE — 82550 ASSAY OF CK (CPK): CPT

## 2018-01-01 PROCEDURE — 3079F DIAST BP 80-89 MM HG: CPT | Mod: CPTII,S$GLB,, | Performed by: FAMILY MEDICINE

## 2018-01-01 PROCEDURE — 3077F SYST BP >= 140 MM HG: CPT | Mod: CPTII,S$GLB,, | Performed by: FAMILY MEDICINE

## 2018-01-01 PROCEDURE — 99220 PR INITIAL OBSERVATION CARE,LEVL III: ICD-10-PCS | Mod: GC,,, | Performed by: PSYCHIATRY & NEUROLOGY

## 2018-01-01 PROCEDURE — A9585 GADOBUTROL INJECTION: HCPCS | Performed by: INTERNAL MEDICINE

## 2018-01-01 PROCEDURE — 63600175 PHARM REV CODE 636 W HCPCS: Performed by: HOSPITALIST

## 2018-01-01 PROCEDURE — 82746 ASSAY OF FOLIC ACID SERUM: CPT

## 2018-01-01 PROCEDURE — 99285 EMERGENCY DEPT VISIT HI MDM: CPT | Mod: 25

## 2018-01-01 PROCEDURE — 3077F SYST BP >= 140 MM HG: CPT | Mod: CPTII,S$GLB,, | Performed by: PHYSICIAN ASSISTANT

## 2018-01-01 PROCEDURE — 1101F PT FALLS ASSESS-DOCD LE1/YR: CPT | Mod: CPTII,S$GLB,, | Performed by: PHYSICIAN ASSISTANT

## 2018-01-01 PROCEDURE — 99204 OFFICE O/P NEW MOD 45 MIN: CPT | Mod: S$GLB,,, | Performed by: FAMILY MEDICINE

## 2018-01-01 PROCEDURE — 77080 DXA BONE DENSITY AXIAL: CPT | Mod: 26,,, | Performed by: INTERNAL MEDICINE

## 2018-01-01 PROCEDURE — 99220 PR INITIAL OBSERVATION CARE,LEVL III: CPT | Mod: GC,,, | Performed by: PSYCHIATRY & NEUROLOGY

## 2018-01-01 PROCEDURE — 97161 PT EVAL LOW COMPLEX 20 MIN: CPT

## 2018-01-01 PROCEDURE — 99999 PR PBB SHADOW E&M-EST. PATIENT-LVL V: CPT | Mod: PBBFAC,,, | Performed by: FAMILY MEDICINE

## 2018-01-01 RX ORDER — AMLODIPINE BESYLATE 10 MG/1
10 TABLET ORAL DAILY
Start: 2018-01-01 | End: 2018-01-01 | Stop reason: HOSPADM

## 2018-01-01 RX ORDER — HYDROCODONE BITARTRATE AND ACETAMINOPHEN 5; 325 MG/1; MG/1
1 TABLET ORAL EVERY 4 HOURS PRN
Qty: 30 TABLET | Refills: 0 | Status: SHIPPED | OUTPATIENT
Start: 2018-01-01 | End: 2019-01-01 | Stop reason: HOSPADM

## 2018-01-01 RX ORDER — CAPSAICIN 0.03 G/100G
CREAM TOPICAL 4 TIMES DAILY PRN
Status: DISCONTINUED | OUTPATIENT
Start: 2018-01-01 | End: 2019-01-01 | Stop reason: HOSPADM

## 2018-01-01 RX ORDER — INSULIN ASPART 100 [IU]/ML
4 INJECTION, SOLUTION INTRAVENOUS; SUBCUTANEOUS
Status: DISCONTINUED | OUTPATIENT
Start: 2018-01-01 | End: 2019-01-01 | Stop reason: HOSPADM

## 2018-01-01 RX ORDER — HYDRALAZINE HYDROCHLORIDE 25 MG/1
25 TABLET, FILM COATED ORAL EVERY 8 HOURS PRN
Status: DISCONTINUED | OUTPATIENT
Start: 2018-01-01 | End: 2019-01-01 | Stop reason: HOSPADM

## 2018-01-01 RX ORDER — INSULIN ASPART 100 [IU]/ML
0-5 INJECTION, SOLUTION INTRAVENOUS; SUBCUTANEOUS
Status: DISCONTINUED | OUTPATIENT
Start: 2018-01-01 | End: 2019-01-01 | Stop reason: HOSPADM

## 2018-01-01 RX ORDER — CAPSAICIN 0.03 G/100G
CREAM TOPICAL 4 TIMES DAILY PRN
Refills: 0 | COMMUNITY
Start: 2018-01-01 | End: 2019-01-01 | Stop reason: HOSPADM

## 2018-01-01 RX ORDER — GADOBUTROL 604.72 MG/ML
5 INJECTION INTRAVENOUS
Status: COMPLETED | OUTPATIENT
Start: 2018-01-01 | End: 2018-01-01

## 2018-01-01 RX ORDER — HYDROCODONE BITARTRATE AND ACETAMINOPHEN 5; 325 MG/1; MG/1
1 TABLET ORAL EVERY 6 HOURS PRN
Status: DISCONTINUED | OUTPATIENT
Start: 2018-01-01 | End: 2019-01-01

## 2018-01-01 RX ORDER — ACETAMINOPHEN 325 MG/1
650 TABLET ORAL EVERY 4 HOURS PRN
Status: DISCONTINUED | OUTPATIENT
Start: 2018-01-01 | End: 2019-01-01 | Stop reason: HOSPADM

## 2018-01-01 RX ORDER — LISINOPRIL 20 MG/1
40 TABLET ORAL DAILY
Status: DISCONTINUED | OUTPATIENT
Start: 2018-01-01 | End: 2019-01-01 | Stop reason: HOSPADM

## 2018-01-01 RX ORDER — GLUCAGON 1 MG
1 KIT INJECTION
Status: DISCONTINUED | OUTPATIENT
Start: 2018-01-01 | End: 2019-01-01 | Stop reason: HOSPADM

## 2018-01-01 RX ORDER — INSULIN ASPART 100 [IU]/ML
0-5 INJECTION, SOLUTION INTRAVENOUS; SUBCUTANEOUS
Refills: 0
Start: 2018-01-01 | End: 2019-01-01 | Stop reason: HOSPADM

## 2018-01-01 RX ORDER — CARVEDILOL 25 MG/1
25 TABLET ORAL 2 TIMES DAILY WITH MEALS
Status: DISCONTINUED | OUTPATIENT
Start: 2018-01-01 | End: 2019-01-01 | Stop reason: HOSPADM

## 2018-01-01 RX ORDER — ALENDRONATE SODIUM 70 MG/1
70 TABLET ORAL
Qty: 4 TABLET | Refills: 11 | Status: ON HOLD | OUTPATIENT
Start: 2018-01-01 | End: 2019-01-01 | Stop reason: HOSPADM

## 2018-01-01 RX ORDER — GLUCAGON 1 MG
1 KIT INJECTION
Status: DISCONTINUED | OUTPATIENT
Start: 2018-01-01 | End: 2018-01-01

## 2018-01-01 RX ORDER — LANOLIN ALCOHOL/MO/W.PET/CERES
400 CREAM (GRAM) TOPICAL DAILY
Status: ON HOLD | COMMUNITY
End: 2019-01-01 | Stop reason: HOSPADM

## 2018-01-01 RX ORDER — INSULIN ASPART 100 [IU]/ML
2 INJECTION, SOLUTION INTRAVENOUS; SUBCUTANEOUS
Status: DISCONTINUED | OUTPATIENT
Start: 2018-01-01 | End: 2018-01-01

## 2018-01-01 RX ORDER — ENOXAPARIN SODIUM 100 MG/ML
40 INJECTION SUBCUTANEOUS EVERY 24 HOURS
Status: DISCONTINUED | OUTPATIENT
Start: 2018-01-01 | End: 2019-01-01 | Stop reason: HOSPADM

## 2018-01-01 RX ORDER — ASPIRIN 81 MG/1
81 TABLET ORAL DAILY
Status: DISCONTINUED | OUTPATIENT
Start: 2018-01-01 | End: 2018-01-01

## 2018-01-01 RX ORDER — LANOLIN ALCOHOL/MO/W.PET/CERES
1000 CREAM (GRAM) TOPICAL DAILY
Status: ON HOLD | COMMUNITY
Start: 2018-01-01 | End: 2019-01-01 | Stop reason: HOSPADM

## 2018-01-01 RX ORDER — IBUPROFEN 200 MG
16 TABLET ORAL
Status: DISCONTINUED | OUTPATIENT
Start: 2018-01-01 | End: 2019-01-01 | Stop reason: HOSPADM

## 2018-01-01 RX ORDER — IBUPROFEN 200 MG
24 TABLET ORAL
Status: DISCONTINUED | OUTPATIENT
Start: 2018-01-01 | End: 2018-01-01

## 2018-01-01 RX ORDER — IBUPROFEN 200 MG
16 TABLET ORAL
Status: DISCONTINUED | OUTPATIENT
Start: 2018-01-01 | End: 2018-01-01

## 2018-01-01 RX ORDER — ENOXAPARIN SODIUM 100 MG/ML
40 INJECTION SUBCUTANEOUS EVERY 24 HOURS
Status: DISCONTINUED | OUTPATIENT
Start: 2018-01-01 | End: 2018-01-01

## 2018-01-01 RX ORDER — HYDROCODONE BITARTRATE AND ACETAMINOPHEN 10; 325 MG/1; MG/1
1 TABLET ORAL EVERY 6 HOURS PRN
Status: DISCONTINUED | OUTPATIENT
Start: 2018-01-01 | End: 2019-01-01

## 2018-01-01 RX ORDER — POLYETHYLENE GLYCOL 3350 17 G/17G
17 POWDER, FOR SOLUTION ORAL DAILY
Status: DISCONTINUED | OUTPATIENT
Start: 2018-01-01 | End: 2019-01-01 | Stop reason: HOSPADM

## 2018-01-01 RX ORDER — LANOLIN ALCOHOL/MO/W.PET/CERES
400 CREAM (GRAM) TOPICAL DAILY
Status: DISCONTINUED | OUTPATIENT
Start: 2018-01-01 | End: 2019-01-01 | Stop reason: HOSPADM

## 2018-01-01 RX ORDER — LANOLIN ALCOHOL/MO/W.PET/CERES
1000 CREAM (GRAM) TOPICAL DAILY
Status: DISCONTINUED | OUTPATIENT
Start: 2018-01-01 | End: 2019-01-01 | Stop reason: HOSPADM

## 2018-01-01 RX ORDER — ACETAMINOPHEN 325 MG/1
650 TABLET ORAL EVERY 4 HOURS PRN
Refills: 0 | Status: ON HOLD | COMMUNITY
Start: 2018-01-01 | End: 2019-01-01 | Stop reason: HOSPADM

## 2018-01-01 RX ORDER — ATORVASTATIN CALCIUM 20 MG/1
40 TABLET, FILM COATED ORAL DAILY
Status: DISCONTINUED | OUTPATIENT
Start: 2018-01-01 | End: 2019-01-01 | Stop reason: HOSPADM

## 2018-01-01 RX ORDER — SODIUM CHLORIDE 0.9 % (FLUSH) 0.9 %
5 SYRINGE (ML) INJECTION
Status: DISCONTINUED | OUTPATIENT
Start: 2018-01-01 | End: 2019-01-01 | Stop reason: HOSPADM

## 2018-01-01 RX ORDER — IBUPROFEN 200 MG
24 TABLET ORAL
Status: DISCONTINUED | OUTPATIENT
Start: 2018-01-01 | End: 2019-01-01 | Stop reason: HOSPADM

## 2018-01-01 RX ORDER — AMLODIPINE BESYLATE 10 MG/1
10 TABLET ORAL DAILY
Status: DISCONTINUED | OUTPATIENT
Start: 2018-01-01 | End: 2018-01-01

## 2018-01-01 RX ORDER — ADHESIVE BANDAGE
30 BANDAGE TOPICAL DAILY PRN
Status: DISCONTINUED | OUTPATIENT
Start: 2018-01-01 | End: 2019-01-01 | Stop reason: HOSPADM

## 2018-01-01 RX ADMIN — MAGNESIUM OXIDE TAB 400 MG (241.3 MG ELEMENTAL MG) 400 MG: 400 (241.3 MG) TAB at 09:12

## 2018-01-01 RX ADMIN — ACETAMINOPHEN 650 MG: 325 TABLET ORAL at 05:12

## 2018-01-01 RX ADMIN — ATORVASTATIN CALCIUM 40 MG: 20 TABLET, FILM COATED ORAL at 08:12

## 2018-01-01 RX ADMIN — LISINOPRIL 40 MG: 20 TABLET ORAL at 08:12

## 2018-01-01 RX ADMIN — INSULIN DETEMIR 6 UNITS: 100 INJECTION, SOLUTION SUBCUTANEOUS at 09:12

## 2018-01-01 RX ADMIN — INSULIN ASPART 4 UNITS: 100 INJECTION, SOLUTION INTRAVENOUS; SUBCUTANEOUS at 02:12

## 2018-01-01 RX ADMIN — HYDROCODONE BITARTRATE AND ACETAMINOPHEN 1 TABLET: 10; 325 TABLET ORAL at 10:12

## 2018-01-01 RX ADMIN — ATORVASTATIN CALCIUM 40 MG: 20 TABLET, FILM COATED ORAL at 09:12

## 2018-01-01 RX ADMIN — MAGNESIUM OXIDE TAB 400 MG (241.3 MG ELEMENTAL MG) 400 MG: 400 (241.3 MG) TAB at 08:12

## 2018-01-01 RX ADMIN — MAGNESIUM HYDROXIDE 2400 MG: 400 SUSPENSION ORAL at 08:12

## 2018-01-01 RX ADMIN — CARVEDILOL 25 MG: 25 TABLET, FILM COATED ORAL at 04:12

## 2018-01-01 RX ADMIN — CARVEDILOL 25 MG: 25 TABLET, FILM COATED ORAL at 05:12

## 2018-01-01 RX ADMIN — INSULIN ASPART 2 UNITS: 100 INJECTION, SOLUTION INTRAVENOUS; SUBCUTANEOUS at 12:12

## 2018-01-01 RX ADMIN — INSULIN ASPART 3 UNITS: 100 INJECTION, SOLUTION INTRAVENOUS; SUBCUTANEOUS at 12:12

## 2018-01-01 RX ADMIN — INSULIN ASPART 1 UNITS: 100 INJECTION, SOLUTION INTRAVENOUS; SUBCUTANEOUS at 09:12

## 2018-01-01 RX ADMIN — AMLODIPINE BESYLATE 10 MG: 10 TABLET ORAL at 08:12

## 2018-01-01 RX ADMIN — HYDROCODONE BITARTRATE AND ACETAMINOPHEN 1 TABLET: 10; 325 TABLET ORAL at 02:12

## 2018-01-01 RX ADMIN — CARVEDILOL 25 MG: 25 TABLET, FILM COATED ORAL at 09:12

## 2018-01-01 RX ADMIN — CARVEDILOL 25 MG: 25 TABLET, FILM COATED ORAL at 08:12

## 2018-01-01 RX ADMIN — HYDROCODONE BITARTRATE AND ACETAMINOPHEN 1 TABLET: 5; 325 TABLET ORAL at 07:12

## 2018-01-01 RX ADMIN — HYDROCODONE BITARTRATE AND ACETAMINOPHEN 1 TABLET: 5; 325 TABLET ORAL at 09:12

## 2018-01-01 RX ADMIN — LISINOPRIL 40 MG: 20 TABLET ORAL at 09:12

## 2018-01-01 RX ADMIN — CAPSAICIN: 0.25 CREAM TOPICAL at 07:12

## 2018-01-01 RX ADMIN — INSULIN ASPART 4 UNITS: 100 INJECTION, SOLUTION INTRAVENOUS; SUBCUTANEOUS at 12:12

## 2018-01-01 RX ADMIN — INSULIN ASPART 4 UNITS: 100 INJECTION, SOLUTION INTRAVENOUS; SUBCUTANEOUS at 09:12

## 2018-01-01 RX ADMIN — INSULIN DETEMIR 6 UNITS: 100 INJECTION, SOLUTION SUBCUTANEOUS at 08:12

## 2018-01-01 RX ADMIN — CYANOCOBALAMIN TAB 1000 MCG 1000 MCG: 1000 TAB at 08:12

## 2018-01-01 RX ADMIN — HYDROCODONE BITARTRATE AND ACETAMINOPHEN 1 TABLET: 5; 325 TABLET ORAL at 05:12

## 2018-01-01 RX ADMIN — HYDROCODONE BITARTRATE AND ACETAMINOPHEN 1 TABLET: 10; 325 TABLET ORAL at 11:12

## 2018-01-01 RX ADMIN — ENOXAPARIN SODIUM 40 MG: 100 INJECTION SUBCUTANEOUS at 04:12

## 2018-01-01 RX ADMIN — INSULIN ASPART 4 UNITS: 100 INJECTION, SOLUTION INTRAVENOUS; SUBCUTANEOUS at 08:12

## 2018-01-01 RX ADMIN — ASPIRIN 81 MG: 81 TABLET, COATED ORAL at 08:12

## 2018-01-01 RX ADMIN — INSULIN ASPART 4 UNITS: 100 INJECTION, SOLUTION INTRAVENOUS; SUBCUTANEOUS at 05:12

## 2018-01-01 RX ADMIN — ATORVASTATIN CALCIUM 40 MG: 20 TABLET, FILM COATED ORAL at 01:12

## 2018-01-01 RX ADMIN — CYANOCOBALAMIN TAB 1000 MCG 1000 MCG: 1000 TAB at 01:12

## 2018-01-01 RX ADMIN — ENOXAPARIN SODIUM 40 MG: 100 INJECTION SUBCUTANEOUS at 06:12

## 2018-01-01 RX ADMIN — INSULIN ASPART 4 UNITS: 100 INJECTION, SOLUTION INTRAVENOUS; SUBCUTANEOUS at 01:12

## 2018-01-01 RX ADMIN — POLYETHYLENE GLYCOL 3350 17 G: 17 POWDER, FOR SOLUTION ORAL at 08:12

## 2018-01-01 RX ADMIN — HYDROCODONE BITARTRATE AND ACETAMINOPHEN 1 TABLET: 10; 325 TABLET ORAL at 03:12

## 2018-01-01 RX ADMIN — HYDROCODONE BITARTRATE AND ACETAMINOPHEN 1 TABLET: 5; 325 TABLET ORAL at 10:12

## 2018-01-01 RX ADMIN — INSULIN ASPART 1 UNITS: 100 INJECTION, SOLUTION INTRAVENOUS; SUBCUTANEOUS at 08:12

## 2018-01-01 RX ADMIN — INSULIN ASPART 4 UNITS: 100 INJECTION, SOLUTION INTRAVENOUS; SUBCUTANEOUS at 06:12

## 2018-01-01 RX ADMIN — ENOXAPARIN SODIUM 40 MG: 100 INJECTION SUBCUTANEOUS at 05:12

## 2018-01-01 RX ADMIN — CAPSAICIN: 0.25 CREAM TOPICAL at 09:12

## 2018-01-01 RX ADMIN — CYANOCOBALAMIN TAB 1000 MCG 1000 MCG: 1000 TAB at 09:12

## 2018-01-01 RX ADMIN — CAPSAICIN: 0.25 CREAM TOPICAL at 04:12

## 2018-01-01 RX ADMIN — AMLODIPINE BESYLATE 10 MG: 10 TABLET ORAL at 09:12

## 2018-01-01 RX ADMIN — GADOBUTROL 5 ML: 604.72 INJECTION INTRAVENOUS at 08:12

## 2018-01-01 RX ADMIN — CARVEDILOL 25 MG: 25 TABLET, FILM COATED ORAL at 06:12

## 2018-01-01 RX ADMIN — LISINOPRIL 40 MG: 20 TABLET ORAL at 10:12

## 2018-01-01 RX ADMIN — CAPSAICIN: 0.25 CREAM TOPICAL at 10:12

## 2018-01-01 RX ADMIN — INSULIN DETEMIR 3 UNITS: 100 INJECTION, SOLUTION SUBCUTANEOUS at 09:12

## 2018-01-01 RX ADMIN — CAPSAICIN: 0.25 CREAM TOPICAL at 05:12

## 2018-01-01 RX ADMIN — HYDROCODONE BITARTRATE AND ACETAMINOPHEN 1 TABLET: 10; 325 TABLET ORAL at 01:12

## 2018-01-01 RX ADMIN — INSULIN ASPART 4 UNITS: 100 INJECTION, SOLUTION INTRAVENOUS; SUBCUTANEOUS at 07:12

## 2018-01-01 RX ADMIN — LISINOPRIL 40 MG: 20 TABLET ORAL at 01:12

## 2018-01-01 RX ADMIN — CAPSAICIN: 0.25 CREAM TOPICAL at 08:12

## 2018-01-01 RX ADMIN — HYDROCODONE BITARTRATE AND ACETAMINOPHEN 1 TABLET: 5; 325 TABLET ORAL at 08:12

## 2018-01-01 RX ADMIN — CAPSAICIN: 0.25 CREAM TOPICAL at 02:12

## 2018-01-01 RX ADMIN — HYDROCODONE BITARTRATE AND ACETAMINOPHEN 1 TABLET: 10; 325 TABLET ORAL at 09:12

## 2018-01-01 RX ADMIN — CAPSAICIN: 0.25 CREAM TOPICAL at 01:12

## 2018-01-01 RX ADMIN — INSULIN ASPART 2 UNITS: 100 INJECTION, SOLUTION INTRAVENOUS; SUBCUTANEOUS at 06:12

## 2018-01-01 RX ADMIN — HYDROCODONE BITARTRATE AND ACETAMINOPHEN 1 TABLET: 5; 325 TABLET ORAL at 12:12

## 2018-01-01 RX ADMIN — ENOXAPARIN SODIUM 40 MG: 100 INJECTION SUBCUTANEOUS at 09:12

## 2018-01-01 RX ADMIN — CARVEDILOL 25 MG: 25 TABLET, FILM COATED ORAL at 01:12

## 2018-01-01 RX ADMIN — CARVEDILOL 25 MG: 25 TABLET, FILM COATED ORAL at 10:12

## 2018-01-01 RX ADMIN — HYDROCODONE BITARTRATE AND ACETAMINOPHEN 1 TABLET: 10; 325 TABLET ORAL at 08:12

## 2018-01-01 RX ADMIN — INSULIN ASPART 4 UNITS: 100 INJECTION, SOLUTION INTRAVENOUS; SUBCUTANEOUS at 10:12

## 2018-01-01 RX ADMIN — MAGNESIUM OXIDE TAB 400 MG (241.3 MG ELEMENTAL MG) 400 MG: 400 (241.3 MG) TAB at 01:12

## 2018-01-01 RX ADMIN — HYDROCODONE BITARTRATE AND ACETAMINOPHEN 1 TABLET: 10; 325 TABLET ORAL at 06:12

## 2018-01-01 RX ADMIN — INSULIN ASPART 2 UNITS: 100 INJECTION, SOLUTION INTRAVENOUS; SUBCUTANEOUS at 09:12

## 2018-01-01 RX ADMIN — ACETAMINOPHEN 650 MG: 325 TABLET ORAL at 07:12

## 2018-01-01 RX ADMIN — POTASSIUM BICARBONATE 50 MEQ: 25 TABLET, EFFERVESCENT ORAL at 01:12

## 2018-01-01 RX ADMIN — INSULIN DETEMIR 6 UNITS: 100 INJECTION, SOLUTION SUBCUTANEOUS at 10:12

## 2018-01-01 RX ADMIN — INSULIN ASPART 2 UNITS: 100 INJECTION, SOLUTION INTRAVENOUS; SUBCUTANEOUS at 05:12

## 2018-01-01 RX ADMIN — INSULIN ASPART 2 UNITS: 100 INJECTION, SOLUTION INTRAVENOUS; SUBCUTANEOUS at 02:12

## 2018-01-01 RX ADMIN — ASPIRIN 81 MG: 81 TABLET, COATED ORAL at 01:12

## 2018-01-01 RX ADMIN — HYDROCODONE BITARTRATE AND ACETAMINOPHEN 1 TABLET: 10; 325 TABLET ORAL at 04:12

## 2018-01-01 RX ADMIN — TUBERCULIN PURIFIED PROTEIN DERIVATIVE 5 UNITS: 5 INJECTION, SOLUTION INTRADERMAL at 01:12

## 2018-01-01 RX ADMIN — HYDROCODONE BITARTRATE AND ACETAMINOPHEN 1 TABLET: 10; 325 TABLET ORAL at 07:12

## 2018-01-01 RX ADMIN — ACETAMINOPHEN 650 MG: 325 TABLET ORAL at 04:12

## 2018-01-01 RX ADMIN — Medication 5 UNITS: at 02:12

## 2018-01-01 RX ADMIN — CYANOCOBALAMIN TAB 1000 MCG 1000 MCG: 1000 TAB at 10:12

## 2018-01-01 RX ADMIN — CARVEDILOL 25 MG: 25 TABLET, FILM COATED ORAL at 07:12

## 2018-01-01 RX ADMIN — MAGNESIUM OXIDE TAB 400 MG (241.3 MG ELEMENTAL MG) 400 MG: 400 (241.3 MG) TAB at 10:12

## 2018-01-01 RX ADMIN — INSULIN ASPART 2 UNITS: 100 INJECTION, SOLUTION INTRAVENOUS; SUBCUTANEOUS at 08:12

## 2018-01-01 RX ADMIN — ACETAMINOPHEN 650 MG: 325 TABLET ORAL at 12:12

## 2018-01-01 RX ADMIN — CAPSAICIN: 0.25 CREAM TOPICAL at 11:12

## 2018-01-01 RX ADMIN — ACETAMINOPHEN 650 MG: 325 TABLET ORAL at 01:12

## 2018-05-04 ENCOUNTER — OFFICE VISIT (OUTPATIENT)
Dept: CARDIOLOGY | Facility: CLINIC | Age: 83
End: 2018-05-04
Payer: MEDICARE

## 2018-05-04 VITALS
HEART RATE: 68 BPM | DIASTOLIC BLOOD PRESSURE: 70 MMHG | HEIGHT: 60 IN | BODY MASS INDEX: 24.15 KG/M2 | WEIGHT: 123 LBS | SYSTOLIC BLOOD PRESSURE: 110 MMHG

## 2018-05-04 DIAGNOSIS — I10 ESSENTIAL HYPERTENSION: Primary | ICD-10-CM

## 2018-05-04 DIAGNOSIS — E78.5 HYPERLIPIDEMIA, UNSPECIFIED HYPERLIPIDEMIA TYPE: ICD-10-CM

## 2018-05-04 DIAGNOSIS — R60.0 LOCALIZED EDEMA: ICD-10-CM

## 2018-05-04 DIAGNOSIS — E11.9 TYPE 2 DIABETES MELLITUS WITHOUT COMPLICATION, WITHOUT LONG-TERM CURRENT USE OF INSULIN: ICD-10-CM

## 2018-05-04 PROCEDURE — 3078F DIAST BP <80 MM HG: CPT | Mod: CPTII,S$GLB,, | Performed by: INTERNAL MEDICINE

## 2018-05-04 PROCEDURE — 93000 ELECTROCARDIOGRAM COMPLETE: CPT | Mod: S$GLB,,, | Performed by: INTERNAL MEDICINE

## 2018-05-04 PROCEDURE — 99213 OFFICE O/P EST LOW 20 MIN: CPT | Mod: 25,S$GLB,, | Performed by: INTERNAL MEDICINE

## 2018-05-04 PROCEDURE — 3074F SYST BP LT 130 MM HG: CPT | Mod: CPTII,S$GLB,, | Performed by: INTERNAL MEDICINE

## 2018-05-04 NOTE — PROGRESS NOTES
Subjective:      Patient ID: Deisi Cheung is a 82 y.o. female.    Chief Complaint: Hypertension    HPI:  Legs feel weak.. Went to [facility] with knee pain months ago and diagnosed with arthritis.   Pt goes to exercise on [address] near [facility].    Review of Systems   Cardiovascular: Negative for chest pain, claudication, dyspnea on exertion, irregular heartbeat, leg swelling, near-syncope, orthopnea, palpitations and syncope.      Pt checks FBS q AM and it varies between 80 and 130    Pt sees eye MD for chronically droopy right eyelid.  Past Medical History:   Diagnosis Date    Diabetes mellitus     Hyperlipidemia     Hypertension         No past surgical history on file.    No family history on file.    Social History     Social History    Marital status: Unknown     Spouse name: N/A    Number of children: N/A    Years of education: N/A     Social History Main Topics    Smoking status: Never Smoker    Smokeless tobacco: Never Used    Alcohol use None    Drug use: Unknown    Sexual activity: Not Asked     Other Topics Concern    None     Social History Narrative    None       Current Outpatient Prescriptions on File Prior to Visit   Medication Sig Dispense Refill    aspirin (ECOTRIN) 81 MG EC tablet Take 81 mg by mouth once daily.      atorvastatin (LIPITOR) 40 MG tablet Take 40 mg by mouth once daily.      carvedilol (COREG) 25 MG tablet Take 1 tablet (25 mg total) by mouth 2 (two) times daily with meals. 180 tablet 3    glipiZIDE (GLUCOTROL) 5 MG TR24 Take 1 tablet (5 mg total) by mouth once daily. (Patient taking differently: Take 5 mg by mouth 2 (two) times daily. ) 90 tablet 3    lisinopril (PRINIVIL,ZESTRIL) 40 MG tablet Take 40 mg by mouth once daily.      metformin (GLUCOPHAGE-XR) 500 MG 24 hr tablet Take 500 mg by mouth 2 (two) times daily with meals.      [DISCONTINUED] amlodipine (NORVASC) 5 MG tablet Take 1 tablet (5 mg total) by mouth once daily. 90 tablet 3     No current  facility-administered medications on file prior to visit.        Review of patient's allergies indicates:   Allergen Reactions    Pcn [penicillins]      Objective:     Vitals:    05/04/18 1029   BP: 110/70   BP Location: Left arm   Patient Position: Sitting   BP Method: Medium (Automatic)   Pulse: 68   Weight: 55.8 kg (123 lb)   Height: 5' (1.524 m)        Physical Exam   Constitutional: She is oriented to person, place, and time. She appears well-developed and well-nourished.   Eyes: No scleral icterus.   Neck: No JVD present. Carotid bruit is not present.   Cardiovascular: Normal rate and regular rhythm.  Exam reveals no gallop.    No murmur heard.  Pulmonary/Chest: Breath sounds normal.   Musculoskeletal: She exhibits edema (trace pitting edema bilaterally).   Neurological: She is alert and oriented to person, place, and time.   Skin: Skin is warm and dry.   Psychiatric: She has a normal mood and affect. Her behavior is normal. Judgment and thought content normal.   Vitals reviewed.     ECG today--NSR, WNL  Assessment:     1. Essential hypertension    2. Hyperlipidemia, unspecified hyperlipidemia type    3. Type 2 diabetes mellitus without complication, without long-term current use of insulin    4. Localized edema    probably due to amlodipine  Plan:   Deisi was seen today for hypertension.    Diagnoses and all orders for this visit:    Essential hypertension    Hyperlipidemia, unspecified hyperlipidemia type    Type 2 diabetes mellitus without complication, without long-term current use of insulin    Localized edema     Same meds  F/u with Dr Mena who does labs  RTC 6 months    Follow-up in about 6 months (around 11/4/2018).

## 2018-11-23 NOTE — PROGRESS NOTES
Subjective:      Patient ID: Deisi Cheung is a 83 y.o. female.    Chief Complaint: Establish Care    HPI   Patient here today for annual exam and is with her family today.  She is completing PT at home twice a week and family would like her to start an intensive inpatient rehab program. Dr. Argueta is her orthopedist and has gotten injections in her right knee for arthritis. She has over 2 months right hand swelling, minimal movement with right shoulder movement. No falls or trauma prior to onset. She was hospitalized at [facility] about 4 months ago for metabolic encephalopathy (negative CT and MRI brain) thought it was secondary to low sugars. She has noticed left thigh pain at times. Her sugars are in the 130s. Her blood pressures at home are in the 130s/80s.  No falls or trauma. She has been constipated.       Review of Systems   Constitutional: Negative for fatigue and fever.   HENT: Negative for congestion.    Eyes: Negative for pain, discharge, itching and visual disturbance.   Respiratory: Negative for cough, choking, chest tightness and shortness of breath.    Cardiovascular: Negative for chest pain.   Gastrointestinal: Positive for constipation. Negative for nausea and vomiting.   Genitourinary: Negative for dysuria.   Musculoskeletal: Positive for arthralgias.     I personally reviewed Past Medical History, Past Surgical history,  Past Social History and Family History      Objective:   BP (!) 142/80   Pulse 76   Ht 5' (1.524 m)   Wt 55.8 kg (123 lb 0.3 oz)   SpO2 97%   BMI 24.03 kg/m²     Physical Exam   Constitutional: She is oriented to person, place, and time. She appears well-developed and well-nourished. No distress.   HENT:   Head: Normocephalic and atraumatic.   Right Ear: Hearing, tympanic membrane, external ear and ear canal normal.   Left Ear: Hearing, tympanic membrane, external ear and ear canal normal.   Nose: Nose normal.   Mouth/Throat: Uvula is midline and oropharynx is clear and moist.  No oropharyngeal exudate.   Eyes: Conjunctivae and EOM are normal. Right eye exhibits no discharge. Left eye exhibits no discharge. No scleral icterus.   Neck: Normal range of motion. Neck supple.   Cardiovascular: Normal rate, regular rhythm, normal heart sounds and intact distal pulses. Exam reveals no gallop.   No murmur heard.  Pulses:       Dorsalis pedis pulses are 2+ on the right side, and 2+ on the left side.        Posterior tibial pulses are 2+ on the right side, and 2+ on the left side.   Pulmonary/Chest: Effort normal and breath sounds normal. No respiratory distress. She has no wheezes. She has no rales. She exhibits no tenderness.   Abdominal: Soft. Bowel sounds are normal. She exhibits no distension and no mass. There is no tenderness. There is no rebound and no guarding.   Musculoskeletal:        Right shoulder: She exhibits decreased range of motion. She exhibits no tenderness, no bony tenderness and no swelling.        Right hand: She exhibits decreased range of motion, tenderness and swelling. Normal sensation noted. Decreased strength noted.        Left hand: Normal.        Right foot: There is normal range of motion and no deformity.        Left foot: There is normal range of motion and no deformity.   No TTP left thigh    Feet:   Right Foot:   Protective Sensation: 6 sites tested. 6 sites sensed.   Skin Integrity: Negative for ulcer or blister.   Left Foot:   Protective Sensation: 6 sites tested. 6 sites sensed.   Skin Integrity: Negative for ulcer or blister.   Neurological: She is alert and oriented to person, place, and time.   Vitals reviewed.      1. Type 2 diabetes mellitus without complication, without long-term current use of insulin    2. Arthritis of right knee    3. Right hand pain    4. Edema of upper extremity    5. Chronic right shoulder pain    6. Essential hypertension    7. Hyperlipidemia, unspecified hyperlipidemia type    8. Onychomycosis    9. Asymptomatic postmenopausal  state    10. Weakness    11. Constipation, unspecified constipation type        1. stable, cont januvia and metformin, glipizide stopped secondary to low sugars   2. stable, patient completing PT for it   3-5. xrays and labs today, schedule ortho follow up  6. stable, cont current regimen   7. stable, cont lipitor   8. Schedule podiatry follow up  9. dxa scan   10. Patient since hospitalization has been wheelchair bound and a full assist for most ADLs, she is working on home PT, will place SW consult to discuss rehab placement  11. Increase water and fiber intake, miralax prn     Orders Placed This Encounter   Procedures    DXA Bone Density Spine And Hip    X-Ray Hand Complete Right    X-ray Shoulder 2 or More Views Right    CBC auto differential    Comprehensive metabolic panel    Hemoglobin A1c    Lipid panel    Microalbumin/creatinine urine ratio    TSH    T4, free    VICKI    Rheumatoid factor    Ambulatory consult to Orthopedics    Ambulatory consult to Podiatry    Ambulatory referral to Outpatient Case Management

## 2018-11-23 NOTE — PATIENT INSTRUCTIONS
Take fiber supplements such as Metamucil, Citrucel, Konsyl, etc. (Benefiber is less effective so avoid)  The goal is 1-2 nonstraining nonloose bowel movements per day.  In general we recommend about 25-30 grams of fiber daily or reaching the above bowel movement goal.

## 2018-11-26 NOTE — TELEPHONE ENCOUNTER
You are anemic, we will replace your b12 and screen for colon cancer, they will contact you to complete a stool study for this

## 2018-11-26 NOTE — PATIENT INSTRUCTIONS
What Is Osteoporosis?  Osteoporosis is a disease that weakens the bones. Weakened bones are more likely to fracture (break). Osteoporosis affects men and women, but postmenopausal women are most at risk. To help prevent osteoporosis, you need to exercise and nourish your bones throughout your life.    Childhood  The body builds the most bone during these years. That's why boys and girls need foods rich in calcium. They also need plenty of exercise. A healthy diet and exercise helps bones grow strong.  Young adulthood to age 30  During young adulthood, bones become their strongest. This is called peak bone mass. The same good habits that kept bones healthy in childhood help keep bone healthy in adulthood.  Age 30 to menopause  Bone mass declines slightly during these years. Your body makes just enough new bone to maintain peak bone mass. To keep your bones at their peak mass, be sure to exercise and get plenty of calcium.  After menopause  Menopause is when a woman stops having monthly periods. After menopause, the body makes less estrogen (female hormone). This increases bone loss. At this point, treatment may be needed to reduce the risk for fracture. Exercise and calcium can also help keep your bones strong.  Later in life  In later years, both men and women need to take extra care of their bones. By this point, the body loses more bone than it makes. If too much bone is lost, you may be at risk for fractures. With age, the quality and quantity of bone declines. You can lessen bone loss by staying active and increasing your calcium intake. Calcium supplements and other osteoporosis treatments do have risks, so talk to your healthcare provider if you have concerns. If you have osteoporosis, you can also learn ways to increase everyday safety.  Date Last Reviewed: 10/17/2015  ©2007 [employer]. [address] [place] [address], [place] [address]. All Rights reserved. This information is not intended as a  substitute for professional medical care. Always follow your healthcare providers instructions.        Osteoporosis Medications: Bisphosphonates  Depending on your needs, your healthcare provider may prescribe medicines to prevent or treat osteoporosis.    Bisphosphonates  Several medicines make up the class of drugs known as bisphosphonates. Bisphosphonates are the most common type of medicine used to help prevent and treat bone loss. Bisphosphonates are given in pill or injectable form as an IV infusion. They must be taken exactly as directed. Benefits may include:  · Reducing bone loss  · Increasing bone density in the hip and spine  · Reducing risk of fractures in the spine, hip, and wrist  Side effects may include:  · Heartburn  · Nausea  · Abdominal pain  · Bone or muscle pain  Taking bisphosphonates pills  Always read medicine information closely. Certain bisphosphonates must be taken:  · On an empty stomach.  · With a full glass of water (8 oz) first thing in the morning.  · At least 30 minutes to one hour before any food, drink, or other medications.  · While sitting or standing. You should not lie down for at least 30 minutes after taking the medicine.  Newer medicines can be taken weekly or monthly. Talk with your doctor to find out which one is right for you.   Date Last Reviewed: 10/11/2015  © 3342-2481 [employer]. [address] [place] [address], [place] [address]. All rights reserved. This information is not intended as a substitute for professional medical care. Always follow your healthcare professional's instructions.

## 2018-11-28 NOTE — PATIENT INSTRUCTIONS
Living with Osteoporosis: Regular Exercise  If you have osteoporosis, exercise is vital for your health. It can prevent bone fractures and spine changes. It will slow bone loss. Exercise will strengthen your body. It can also be fun. A variety of exercises is best. See below for exercises that can help you. Before you start, though, talk to your healthcare provider to be sure these exercises are right for you.    Resistance exercises. These build muscle strength and maintain bone mass. They also make you less prone to injury. Exercises include lifting small weights, doing push-ups and sit-ups, using elastic exercise bands, and using weight machines.  Weight-bearing activities. These help your whole body. They also help you maintain bone mass. Activities include walking, dancing, and housework.  Nonweight-bearing exercises. These help prevent back strain and pain. They do this by building the trunk and leg muscles. Exercises that help with flexibility can prevent falls. Examples include swimming, water exercise, and stretching.  Staying safe  Here are tips to stay safe:   · Always check with your healthcare provider before starting any new exercise program.  · Use weights only as instructed.  · Stop any exercise that causes pain.   Date Last Reviewed: 10/17/2015  © 4824-5755 [employer]. [address] [place] [address], [place] [address]. All rights reserved. This information is not intended as a substitute for professional medical care. Always follow your healthcare professional's instructions.

## 2018-11-29 NOTE — PROGRESS NOTES
Please note the following patient's information was forwarded to [facility] ([facility]) for case management and/or  on 11/27/18.    Please see the media section of patient's chart for additional details.    Please contact Ext. [phone] with any questions.    Thank you,    Kayli Almanzar, [facility]patient [facility]  [phone]

## 2018-11-30 NOTE — PROGRESS NOTES
Subjective:      Patient ID: Deisi Cheung is a 83 y.o. female.    Chief Complaint: Pain of the Right Hand and Pain of the Right Shoulder      HPI  Deisi Cheung is a right hand dominant 83 y.o. female presenting today referred by her PCP for right upper extremity weakness. She presents with family members. Symptoms began over 2 months ago. She denies injury or trauma. She has weakness of the entire RUE. She has difficulty with active shoulder, elbow, wrist, and finger motion. She notes edema of the hand which is improved with elevation. She has flexion contracture of the fingers. She is able to passively extend them to some degree however it can be painful. She was previously a patient at [facility] and is transferring care to [facility]. She has seen an orthopedist there who she sees for her knee, he ordered OT for the upper extremity. She is currently getting home health OT. She denies notable numbness or tingling. Denies neck pain. Denies recent hx of known cerebrovascular event. She has prior shoulder and hand xrays which are unremarkable.      Review of patient's allergies indicates:   Allergen Reactions    Pcn [penicillins]          Current Outpatient Medications   Medication Sig Dispense Refill    alendronate (FOSAMAX) 70 MG tablet Take 1 tablet (70 mg total) by mouth every 7 days. 4 tablet 11    aspirin (ECOTRIN) 81 MG EC tablet Take 81 mg by mouth once daily.      atorvastatin (LIPITOR) 40 MG tablet Take 40 mg by mouth once daily.      carvedilol (COREG) 25 MG tablet Take 1 tablet (25 mg total) by mouth 2 (two) times daily with meals. 180 tablet 3    lisinopril (PRINIVIL,ZESTRIL) 40 MG tablet Take 40 mg by mouth once daily.      magnesium oxide (MAG-OX) 400 mg (241.3 mg magnesium) tablet Take 400 mg by mouth once daily.      metformin (GLUCOPHAGE-XR) 500 MG 24 hr tablet Take 500 mg by mouth 2 (two) times daily with meals.      SITagliptin (JANUVIA) 50 MG Tab Take 100 mg by mouth once daily.       No  current facility-administered medications for this visit.        Past Medical History:   Diagnosis Date    Arthritis of right knee     Diabetes mellitus     Hyperlipidemia     Hypertension        Past Surgical History:   Procedure Laterality Date    none         Review of Systems:  Constitutional: Negative for chills and fever.   Respiratory: Negative for cough and shortness of breath.    Gastrointestinal: Negative for nausea and vomiting.   Skin: Negative for rash.   Neurological: Negative for dizziness and headaches.   Psychiatric/Behavioral: Negative for depression.   MSK as in HPI       OBJECTIVE:     PHYSICAL EXAM:  BP (!) 159/82 (BP Location: Left arm, Patient Position: Sitting, BP Method: Small (Automatic))   Pulse 79   Ht 5' (1.524 m)   Wt 55.8 kg (123 lb)   BMI 24.02 kg/m²     GEN:  NAD, well-developed, well-groomed.  NEURO: Awake, alert, and oriented. Normal attention and concentration.    PSYCH: Normal mood and affect. Behavior is normal.  HEENT: No cervical lymphadenopathy noted.  CARDIOVASCULAR: Radial pulses 2+ bilaterally. No LE edema noted.  PULMONARY: Breath sounds normal. No respiratory distress.  SKIN: Intact, no rashes.      MSK:   RUE:  Pt has significant weakness and decreased active ROM of the shoulder, elbow, wrist, and fingers. there is edema of the hand and fingers. There are flexion contractures of the fingers, they are able to be passively extended to some degree. she does have increased passive motion of the shoulder and elbow however it does cause discomfort. AIN/PIN/Radial/Median/Ulnar Nerves assessed in isolation without deficit, there is decreased sensation in the ulnar distribution of the hand. Radial & Ulnar arteries palpated 2+. Capillary Refill <3s.    RADIOGRAPHS:  Xray right shoulder 11/23/18  FINDINGS:  There is osseous demineralization.  There is no evidence of fracture or dislocation.  No significant degenerative change is present at the shoulder.  Incidental note  is made of calcification along the wall of the aorta.    Xray right hand 11/23/18  FINDINGS:  Suboptimal positioning on the frontal and oblique views with flexion of the 2nd through 5th digits making evaluation of the middle and distal phalanges difficult..  Bones appear somewhat demineralized.  Alignment is otherwise satisfactory and joint spaces appear fairly well maintained.  No definite evidence of fracture or dislocation.  Mild degenerative changes at some of the interphalangeal joints.  There may be some mild soft tissue swelling at the wrist.    Comments: I have personally reviewed the imaging and I agree with the above radiologist's report.    ASSESSMENT/PLAN:       ICD-10-CM ICD-9-CM   1. Weakness of right upper extremity R29.898 729.89       Orders Placed This Encounter    Ambulatory Referral to Neurology      Plan:   -referral to neurology   -EMG ordered  -continue home health OT, script given to allow them to make finger extension splint   -RTC following EMG         The patient indicates understanding of these issues and agrees to the plan.    Carole Hall PA-C  [facility]   [facility]  [place], [place]

## 2018-11-30 NOTE — LETTER
November 30, 2018      Dona Mccullough MD  [address]  [facility] [address]           [facility]  [address], [address]  [facility] [address]  Phone: [phone]          Patient: Deisi Cheung   MR Number: [id number]   YOB: 1935   Date of Visit: 11/30/2018       Dear Dr. Dona Mccullough:    Thank you for referring Deisi Cheung to me for evaluation. Attached you will find relevant portions of my assessment and plan of care.    If you have questions, please do not hesitate to call me. I look forward to following Deisi Cheung along with you.    Sincerely,    Carole Hall PA-C    Enclosure  CC:  No Recipients    If you would like to receive this communication electronically, please contact [email or web address] or [phone] to request more information on [employer] access.    For providers and/or their staff who would like to refer a patient to [facility], please contact us through our one-stop-shop provider referral line, [facility] , at [phone].    If you feel you have received this communication in error or would no longer like to receive these types of communications, please e-mail [email or web address]

## 2018-12-14 PROBLEM — Z74.09 IMMOBILITY: Status: ACTIVE | Noted: 2018-01-01

## 2018-12-14 PROBLEM — R26.9 GAIT DISTURBANCE: Status: ACTIVE | Noted: 2018-01-01

## 2018-12-14 NOTE — ED PROVIDER NOTES
Encounter Date: 12/14/2018    SCRIBE #1 NOTE: I, Leonardo Farfan , am scribing for, and in the presence of,  Corwin Henriquez MD. I have scribed the following portions of the note - Other sections scribed: HPI, ROS, MDM, Dispo.       History     Chief Complaint   Patient presents with    Arm Problem     r wrist and hand pain and swelling for 1 month, has neurology appt in jan,      The patient is a 83 y.o. female with co-morbidities including HTN, DM, and HLD  who presents to the ED with a complaint of right arm swelling and pain. Pt states her right arm has been swollen for a month, but swelling was worse this morning when she woke up. She notes pain in her right shoulder and difficulty moving right arm, but denies any falls or injuries. Pt receives occupational physical therapy at her home with her last session being 1 week ago. She also states that her pain occurs when trying to move her arm, but there is no pain to touch.       The history is provided by the patient.     Review of patient's allergies indicates:   Allergen Reactions    Pcn [penicillins]      Past Medical History:   Diagnosis Date    Arthritis of right knee     Diabetes mellitus     Hyperlipidemia     Hypertension      Past Surgical History:   Procedure Laterality Date    none       Family History   Problem Relation Age of Onset    Heart failure Sister     Prostate cancer Brother     Stroke Sister     Diabetes Sister      Social History     Tobacco Use    Smoking status: Never Smoker    Smokeless tobacco: Never Used   Substance Use Topics    Alcohol use: No     Alcohol/week: 0.0 oz     Frequency: Never    Drug use: No     Review of Systems   Constitutional: Negative for fever.   HENT: Negative for sore throat.    Eyes: Negative for visual disturbance.   Respiratory: Negative for shortness of breath.    Cardiovascular: Negative for chest pain.   Gastrointestinal: Negative for nausea.   Genitourinary: Negative for dysuria.    Musculoskeletal: Negative for back pain.        Positive for right arm swelling and pain. Positive for right shoulder pain.   Skin: Negative for rash.   Neurological: Positive for weakness (right arm).   Hematological: Does not bruise/bleed easily.       Physical Exam     Initial Vitals [12/14/18 1043]   BP Pulse Resp Temp SpO2   (!) 169/82 86 18 98.6 °F (37 °C) 96 %      MAP       --         Physical Exam    Vitals reviewed.  Constitutional:   Elderly appearing 83-year-old Afro-American woman, bed-bound, no acute distress noted   HENT:   Head: Normocephalic and atraumatic.   Mouth/Throat: Oropharynx is clear and moist.   Eyes: EOM are normal. Pupils are equal, round, and reactive to light.   Neck: No tracheal deviation present.   Cardiovascular: Normal rate, regular rhythm and intact distal pulses.   Pulmonary/Chest: Breath sounds normal. No stridor. No respiratory distress. She has no wheezes.   Abdominal: Soft. She exhibits no distension. There is no tenderness.   Musculoskeletal:   Right upper extremity:  There is tenderness of the right shoulder without associated deformity or external evidence of trauma, mildly increased warmth is noted to the affected extremity, there is moderate edema of the distal hand with all fingers exhibiting a flexion deformity as previously described with discomfort upon passive extension  Patient exhibits minimal 3/5 strength of bilateral lower extremities which family describes as present for several months     Neurological: She is alert and oriented to person, place, and time. GCS score is 15. GCS eye subscore is 4. GCS verbal subscore is 5. GCS motor subscore is 6.   Patient is nonambulatory   Skin: Skin is warm and dry. No abscess noted.   Psychiatric: Her behavior is normal. Thought content normal.         ED Course   Procedures  Labs Reviewed   CBC W/ AUTO DIFFERENTIAL - Abnormal; Notable for the following components:       Result Value    RBC 3.97 (*)     Hemoglobin 10.8 (*)      Hematocrit 33.8 (*)     RDW 15.9 (*)     Platelets 357 (*)     All other components within normal limits   COMPREHENSIVE METABOLIC PANEL - Abnormal; Notable for the following components:    Sodium 135 (*)     Glucose 193 (*)     Albumin 3.0 (*)     ALT 7 (*)     All other components within normal limits   C-REACTIVE PROTEIN - Abnormal; Notable for the following components:    CRP 12.3 (*)     All other components within normal limits   URINALYSIS, REFLEX TO URINE CULTURE    Narrative:     Preferred Collection Type->Urine, Clean Catch  orange top cup   MAGNESIUM   PHOSPHORUS   CK          Imaging Results          US Upper Extremity Veins Right (Final result)  Result time 12/14/18 16:29:08    Final result by Tamra Dewey MD (12/14/18 16:29:08)                 Impression:      No thrombus in central veins of the right upper extremity.    Electronically signed by resident: Nayana Rowan  Date:    12/14/2018  Time:    16:01    Electronically signed by: Tamra Dewey MD  Date:    12/14/2018  Time:    16:29             Narrative:    EXAMINATION:  US UPPER EXTREMITY VEINS RIGHT    CLINICAL HISTORY:  right upper extremity swelling;    TECHNIQUE:  Duplex and color flow Doppler evaluation and dynamic compression was performed of the right upper extremity veins.    COMPARISON:  None    FINDINGS:  Central veins: The internal jugular, subclavian, and axillary veins are patent and free of thrombus.    Arm veins: The brachial, basilic, and cephalic veins are patent and compressible.    Contralateral subclavian/internal jugular veins: The left subclavian and internal jugular veins are patent and free of thrombus.    Other findings: None.                               MRI Brain Without Contrast (Final result)  Result time 12/14/18 15:08:32    Final result by Paul Bocanegra MD (12/14/18 15:08:32)                 Impression:      No evidence of acute intracranial pathology.    Moderate generalized cerebral volume loss and  moderate chronic microvascular ischemic changes.    Electronically signed by resident: Pawan Gandara  Date:    12/14/2018  Time:    13:40    Electronically signed by: Paul Bocanegra MD  Date:    12/14/2018  Time:    15:08             Narrative:    EXAMINATION:  MRI BRAIN WITHOUT CONTRAST    CLINICAL HISTORY:  extremity weaknessUnspecified abnormalities of gait and mobility    TECHNIQUE:  Multiplanar multisequence MR imaging of the brain was performed without intravenous contrast.    COMPARISON:  None.    FINDINGS:  Intracranial Compartment:    Generalized cerebral volume loss and punctate foci of T2/FLAIR signal hyperintensity in supratentorial white matter without corresponding enhancement suggestive of chronic microvascular ischemic changes.  T2/FLAIR hyperintensity along the dura overlying the right cerebral convexity which may represent dural thickening from prior subdural hematoma.  No mass or recent or remote major vascular distribution infarct.    No extra-axial fluid collections.    Normal vascular flow voids are preserved.    Skull/Extracranial Contents (limited evaluation):    Bone marrow signal intensity is normal.                                 Medical Decision Making:   History:   Old Medical Records: I decided to obtain old medical records.  Differential Diagnosis:   DVT, subacute stroke, electrolyte derangement, gait disturbance, deconditioning, dependent edema  Clinical Tests:   Lab Tests: Ordered and Reviewed  Radiological Study: Ordered and Reviewed  Other:   I have discussed this case with another health care provider.            Scribe Attestation:   Scribe #1: I performed the above scribed service and the documentation accurately describes the services I performed. I attest to the accuracy of the note.    Attending Attestation:             Attending ED Notes:   Per discussion with the family and review of recent orthopedic clinic note, the patient has exhibited symptoms of severe right upper  extremity weakness associated with gait disturbance/inability to ambulate independently for several months and reportedly has not undergone any brain imaging in the interim.  At the time of my exam, the patient exhibits bullous edema of the right distal extremity without external evidence of trauma.    Emergency department evaluation today reveals evidence of mild macrocytic anemia as well as minimal hyponatremia and mild to moderate hyperglycemia in this known non insulin dependent diabetic.  Additionally, MRI of the brain does not reveal evidence of remote or active stroke to explain this patient's gait disturbance and extremity weakness. Also, ultrasound of the right upper extremity for evaluation of acute swelling does not reveal any evidence of embolic disease.  Despite these encouraging radiographic findings, the patient continues to exhibit severe deconditioning with an inability to ambulate or care for self resulting in a bed-bound state while residing with her family at home.  ED findings have been discussed with the patient and accompanying family members.  Emergency department findings and concerns have been discussed with internal medicine on call, and the patient will be placed in observation under the care in fair condition with plans for inpatient rehabilitation placement next available.             Clinical Impression:   The primary encounter diagnosis was Localized swelling on right hand. Diagnoses of Gait disturbance and Hyperglycemia without ketosis were also pertinent to this visit.      Disposition:   Disposition: Placed in Observation  Condition: Fair                        Corwin Henriquez MD  12/14/18 [id number]

## 2018-12-14 NOTE — ED NOTES
Pt identifiers Deisi Cheung were checked and correct  LOC: The patient is awake, alert, aware of environment with an appropriate affect. Oriented x3, speaking appropriately  APPEARANCE: Pt resting comfortably, in no acute distress, pt is clean and well groomed, clothing properly fastened  SKIN: Skin warm, dry and intact, normal skin turgor, moist mucus membranes  RESPIRATORY: Airway is open and patent, respirations are spontaneous, even and unlabored, normal effort and rate  CARDIAC: Normal rate and rhythm, no peripheral edema noted, capillary refill < 3 seconds, bilateral radial pulses 2+  ABDOMEN: Soft, non tender, non distended. Bowel sounds present x 4 quadrants.   NEUROLOGIC: PERRL, facial expression is symmetrical, patient moving all extremities spontaneously, normal sensation in all extremities when touched with a finger.  Follows all commands appropriately  MUSCULOSKELETAL: No obvious deformities. Swelling noted to right arm x 1 month. Pt denies injury.

## 2018-12-14 NOTE — ED TRIAGE NOTES
Patient states she noticed swelling in right arm that started 2 months ago. Patient states swelling is very painful today. Radial pulse is present and strong. +1 pitting edema noted. Denies any injury or trauma.

## 2018-12-14 NOTE — SUBJECTIVE & OBJECTIVE
Past Medical History:   Diagnosis Date    Arthritis of right knee     Diabetes mellitus     Hyperlipidemia     Hypertension        Past Surgical History:   Procedure Laterality Date    none         Review of patient's allergies indicates:   Allergen Reactions    Pcn [penicillins]        No current facility-administered medications on file prior to encounter.      Current Outpatient Medications on File Prior to Encounter   Medication Sig    alendronate (FOSAMAX) 70 MG tablet Take 1 tablet (70 mg total) by mouth every 7 days.    aspirin (ECOTRIN) 81 MG EC tablet Take 81 mg by mouth once daily.    atorvastatin (LIPITOR) 40 MG tablet Take 40 mg by mouth once daily.    carvedilol (COREG) 25 MG tablet Take 1 tablet (25 mg total) by mouth 2 (two) times daily with meals.    lisinopril (PRINIVIL,ZESTRIL) 40 MG tablet Take 40 mg by mouth once daily.    magnesium oxide (MAG-OX) 400 mg (241.3 mg magnesium) tablet Take 400 mg by mouth once daily.    metformin (GLUCOPHAGE-XR) 500 MG 24 hr tablet Take 500 mg by mouth 2 (two) times daily with meals.    SITagliptin (JANUVIA) 50 MG Tab Take 100 mg by mouth once daily.     Family History     Problem Relation (Age of Onset)    Diabetes Sister    Heart failure Sister    Prostate cancer Brother    Stroke Sister        Tobacco Use    Smoking status: Never Smoker    Smokeless tobacco: Never Used   Substance and Sexual Activity    Alcohol use: No     Alcohol/week: 0.0 oz     Frequency: Never    Drug use: No    Sexual activity: No     Review of Systems   Constitutional: Negative for chills and fever.   HENT: Negative for rhinorrhea and sore throat.    Respiratory: Negative for cough and shortness of breath.    Cardiovascular: Negative for chest pain, palpitations and leg swelling.   Gastrointestinal: Negative for abdominal pain, constipation, diarrhea and nausea.   Endocrine: Negative for polyuria.   Genitourinary: Negative for dysuria and frequency.   Musculoskeletal:  Positive for arthralgias. Negative for myalgias.   Skin: Negative for pallor and rash.   Neurological: Positive for weakness. Negative for numbness and headaches.   Psychiatric/Behavioral: Negative for confusion and hallucinations.     Objective:     Vital Signs (Most Recent):  Temp: 98.3 °F (36.8 °C) (12/14/18 1305)  Pulse: 86 (12/14/18 1043)  Resp: 18 (12/14/18 1043)  BP: (!) 169/82 (12/14/18 1043)  SpO2: 96 % (12/14/18 1043) Vital Signs (24h Range):  Temp:  [98.3 °F (36.8 °C)-98.6 °F (37 °C)] 98.3 °F (36.8 °C)  Pulse:  [86] 86  Resp:  [18] 18  SpO2:  [96 %] 96 %  BP: (169)/(82) 169/82     Weight: 54.9 kg (121 lb)  Body mass index is 22.13 kg/m².    Physical Exam   Constitutional: She is oriented to person, place, and time. No distress.   Cachectic lady   HENT:   Head: Normocephalic and atraumatic.   Mouth/Throat: No oropharyngeal exudate.   Eyes: Right eye exhibits no discharge. Left eye exhibits no discharge. No scleral icterus.   Neck: Neck supple. No tracheal deviation present.   Cardiovascular: Normal rate, regular rhythm and normal heart sounds. Exam reveals no gallop and no friction rub.   Pulmonary/Chest: Effort normal and breath sounds normal. No respiratory distress. She has no wheezes. She has no rales. She exhibits no tenderness.   Abdominal: Soft. Bowel sounds are normal. She exhibits no distension. There is no tenderness. There is no rebound and no guarding.   Musculoskeletal:   R hand is significantly more swollen than L, had contracture of R fingers and elbow, very limited active movement of R arm or hand , has some passive ROM but limited by pain   Neurological: She is alert and oriented to person, place, and time.   Skin: Skin is warm and dry. No rash noted. She is not diaphoretic. No erythema.   Psychiatric: She has a normal mood and affect. Her behavior is normal. Thought content normal.        Significant Labs:   Recent Lab Results       12/14/18  1437   12/14/18  1151        Immature  Granulocytes   0.5     Immature Grans (Abs)   0.04  Comment:  Mild elevation in immature granulocytes is non specific and   can be seen in a variety of conditions including stress response,   acute inflammation, trauma and pregnancy. Correlation with other   laboratory and clinical findings is essential.       Albumin   3.0     Alkaline Phosphatase   77     ALT   7     Anion Gap   13     Appearance, UA Clear       AST   11     Baso #   0.03     Basophil%   0.4     Bilirubin (UA) Negative       Total Bilirubin   0.3  Comment:  For infants and newborns, interpretation of results should be based  on gestational age, weight and in agreement with clinical  observations.  Premature Infant recommended reference ranges:  Up to 24 hours.............<8.0 mg/dL  Up to 48 hours............<12.0 mg/dL  3-5 days..................<15.0 mg/dL  6-29 days.................<15.0 mg/dL       BUN, Bld   17     Calcium   9.1     Chloride   97     CO2   25     Color, UA Yellow       Creatinine   0.7     CRP   12.3     Differential Method   Automated     eGFR if    >60.0     eGFR if non    >60.0  Comment:  Calculation used to obtain the estimated glomerular filtration  rate (eGFR) is the CKD-EPI equation.        Eos #   0.1     Eosinophil%   1.8     Glucose   193     Glucose, UA Negative       Gran # (ANC)   4.9     Gran%   63.7     Hematocrit   33.8     Hemoglobin   10.8     Ketones, UA Negative       Leukocytes, UA Negative       Lymph #   2.0     Lymph%   25.7     Magnesium   1.8     MCH   27.2     MCHC   32.0     MCV   85     Mono #   0.6     Mono%   7.9     MPV   10.6     Nitrite, UA Negative       nRBC   0     Occult Blood UA Negative       pH, UA 7.0       Phosphorus   3.2     Platelets   357     Potassium   3.8     Total Protein   6.8     Protein, UA Negative  Comment:  Recommend a 24 hour urine protein or a urine   protein/creatinine ratio if globulin induced proteinuria is  clinically suspected.          RBC   3.97     RDW   15.9     Sodium   135     Specific [place], UA 1.015       Specimen UA Urine, Catheterized       WBC   7.64           Significant Imaging: I have reviewed all pertinent imaging results/findings within the past 24 hours.

## 2018-12-14 NOTE — ASSESSMENT & PLAN NOTE
"R hand edema and weakness  Unclear exact etiology  MRI brain ana maria howed "No evidence of acute intracranial pathology"  US showed "No thrombus in central veins of the right upper extremity."  Pt was supposed to have outpt EMG done by neurology  PT and OT    "

## 2018-12-14 NOTE — HPI
"84 YO lady with HTN, HLD, Type 2 diabetes on oral hypoglycemics and osteoarthritis who presented with complaint of persistant right arm weakness and right hand swelling for past 5 months. She denies injury or trauma. She has weakness of the entire RUE. She has difficulty with active shoulder, elbow, wrist, and finger motion. She has flexion contracture of the fingers. She notes edema of the hand which is improved with elevation. She is able to passively extend them to some degree however limited by pain.  She denies notable numbness or tingling. Confusion, trauma, swelling in any other extremities any rashes, fever or chills, neck pain,  hx of known CVA. She was evaluated by Orthopedics in past with  prior x-rays which are unremarkable. So she was sent to Neurology for further evaluation and recommended an EMG. On presentation to ED, she had an MRI brain done that showed " No evidence of acute intracranial pathology.Moderate generalized cerebral volume loss and moderate chronic microvascular ischemic changes" and US of RUE that showed "No thrombus in central veins of the right upper extremity."   Of note pt has been bed bound and not ambulatory for months (no clear inciting events) she has worked with PT and OT in past for her knee pain but has progressively has become bed bound and relies on her family for her ADLs. Family reports that they can no longer meet all her need and are requesting admission for placement to nursing home.   "

## 2018-12-14 NOTE — H&P
"[facility] Hospital Medicine  History & Physical    Patient Name: Deisi Cheung  MRN: [id number]  Admission Date: 12/14/2018  Attending Physician: GIOVANA Burrows MD   Primary Care Provider: Santos Mena MD    [facility] Medicine Team: [facility] MED F Chrissy Ellington MD     Patient information was obtained from patient, relative(s), past medical records and ER records.     Subjective:     Principal Problem:Immobility    Chief Complaint:   Chief Complaint   Patient presents with    Arm Problem     r wrist and hand pain and swelling for 1 month, has neurology appt in jan,         HPI: Pt is a 84 YO lady with HTN, HLD, DM2 and arthritis who presented with complaint of presistant R arm weakness and R hand swelling for past 5 months. She denies injury or trauma. She has weakness of the entire RUE. She has difficulty with active shoulder, elbow, wrist, and finger motion.She has flexion contracture of the fingers. She notes edema of the hand which is improved with elevation.  She is able to passively extend them to some degree however limited by pain.  She denies notable numbness or tingling. Confusion, trauma, swelling in any other extremities any rashes, fever or chills, neck pain,  hx of knownCVA. She was evaluated by orthopedics in past w  prior xrays which are unremarkable. So she was sent to neurology for further eval and recommended an EMG which has not been done yet. On presentation to ED she had an MRI brain done that showed " No evidence of acute intracranial pathology.Moderate generalized cerebral volume loss and moderate chronic microvascular ischemic changes" and US of RUE that showed "No thrombus in central veins of the right upper extremity."   Of note pt has been bed bound and not ambulatory for months (no clear inciting events) she has worked with PT and OT in past for her knee pain but has progressively has become bed bound and relies on her family for her ADLs. Family reports that " they can no longer meet all her need and are requesting admission for placement to nursing home. We will admit for safe dispo planning, SPK has been ordered     Past Medical History:   Diagnosis Date    Arthritis of right knee     Diabetes mellitus     Hyperlipidemia     Hypertension        Past Surgical History:   Procedure Laterality Date    none         Review of patient's allergies indicates:   Allergen Reactions    Pcn [penicillins]        No current facility-administered medications on file prior to encounter.      Current Outpatient Medications on File Prior to Encounter   Medication Sig    alendronate (FOSAMAX) 70 MG tablet Take 1 tablet (70 mg total) by mouth every 7 days.    aspirin (ECOTRIN) 81 MG EC tablet Take 81 mg by mouth once daily.    atorvastatin (LIPITOR) 40 MG tablet Take 40 mg by mouth once daily.    carvedilol (COREG) 25 MG tablet Take 1 tablet (25 mg total) by mouth 2 (two) times daily with meals.    lisinopril (PRINIVIL,ZESTRIL) 40 MG tablet Take 40 mg by mouth once daily.    magnesium oxide (MAG-OX) 400 mg (241.3 mg magnesium) tablet Take 400 mg by mouth once daily.    metformin (GLUCOPHAGE-XR) 500 MG 24 hr tablet Take 500 mg by mouth 2 (two) times daily with meals.    SITagliptin (JANUVIA) 50 MG Tab Take 100 mg by mouth once daily.     Family History     Problem Relation (Age of Onset)    Diabetes Sister    Heart failure Sister    Prostate cancer Brother    Stroke Sister        Tobacco Use    Smoking status: Never Smoker    Smokeless tobacco: Never Used   Substance and Sexual Activity    Alcohol use: No     Alcohol/week: 0.0 oz     Frequency: Never    Drug use: No    Sexual activity: No     Review of Systems   Constitutional: Negative for chills and fever.   HENT: Negative for rhinorrhea and sore throat.    Respiratory: Negative for cough and shortness of breath.    Cardiovascular: Negative for chest pain, palpitations and leg swelling.   Gastrointestinal: Negative for  abdominal pain, constipation, diarrhea and nausea.   Endocrine: Negative for polyuria.   Genitourinary: Negative for dysuria and frequency.   Musculoskeletal: Positive for arthralgias. Negative for myalgias.   Skin: Negative for pallor and rash.   Neurological: Positive for weakness. Negative for numbness and headaches.   Psychiatric/Behavioral: Negative for confusion and hallucinations.     Objective:     Vital Signs (Most Recent):  Temp: 98.3 °F (36.8 °C) (12/14/18 1305)  Pulse: 86 (12/14/18 1043)  Resp: 18 (12/14/18 1043)  BP: (!) 169/82 (12/14/18 1043)  SpO2: 96 % (12/14/18 1043) Vital Signs (24h Range):  Temp:  [98.3 °F (36.8 °C)-98.6 °F (37 °C)] 98.3 °F (36.8 °C)  Pulse:  [86] 86  Resp:  [18] 18  SpO2:  [96 %] 96 %  BP: (169)/(82) 169/82     Weight: 54.9 kg (121 lb)  Body mass index is 22.13 kg/m².    Physical Exam   Constitutional: She is oriented to person, place, and time. No distress.   Cachectic lady   HENT:   Head: Normocephalic and atraumatic.   Mouth/Throat: No oropharyngeal exudate.   Eyes: Right eye exhibits no discharge. Left eye exhibits no discharge. No scleral icterus.   Neck: Neck supple. No tracheal deviation present.   Cardiovascular: Normal rate, regular rhythm and normal heart sounds. Exam reveals no gallop and no friction rub.   Pulmonary/Chest: Effort normal and breath sounds normal. No respiratory distress. She has no wheezes. She has no rales. She exhibits no tenderness.   Abdominal: Soft. Bowel sounds are normal. She exhibits no distension. There is no tenderness. There is no rebound and no guarding.   Musculoskeletal:   R hand is significantly more swollen than L, had contracture of R fingers and elbow, very limited active movement of R arm or hand , has some passive ROM but limited by pain   Neurological: She is alert and oriented to person, place, and time.   Skin: Skin is warm and dry. No rash noted. She is not diaphoretic. No erythema.   Psychiatric: She has a normal mood and  affect. Her behavior is normal. Thought content normal.        Significant Labs:   Recent Lab Results       12/14/18  1437   12/14/18  1151        Immature Granulocytes   0.5     Immature Grans (Abs)   0.04  Comment:  Mild elevation in immature granulocytes is non specific and   can be seen in a variety of conditions including stress response,   acute inflammation, trauma and pregnancy. Correlation with other   laboratory and clinical findings is essential.       Albumin   3.0     Alkaline Phosphatase   77     ALT   7     Anion Gap   13     Appearance, UA Clear       AST   11     Baso #   0.03     Basophil%   0.4     Bilirubin (UA) Negative       Total Bilirubin   0.3  Comment:  For infants and newborns, interpretation of results should be based  on gestational age, weight and in agreement with clinical  observations.  Premature Infant recommended reference ranges:  Up to 24 hours.............<8.0 mg/dL  Up to 48 hours............<12.0 mg/dL  3-5 days..................<15.0 mg/dL  6-29 days.................<15.0 mg/dL       BUN, Bld   17     Calcium   9.1     Chloride   97     CO2   25     Color, UA Yellow       Creatinine   0.7     CRP   12.3     Differential Method   Automated     eGFR if    >60.0     eGFR if non    >60.0  Comment:  Calculation used to obtain the estimated glomerular filtration  rate (eGFR) is the CKD-EPI equation.        Eos #   0.1     Eosinophil%   1.8     Glucose   193     Glucose, UA Negative       Gran # (ANC)   4.9     Gran%   63.7     Hematocrit   33.8     Hemoglobin   10.8     Ketones, UA Negative       Leukocytes, UA Negative       Lymph #   2.0     Lymph%   25.7     Magnesium   1.8     MCH   27.2     MCHC   32.0     MCV   85     Mono #   0.6     Mono%   7.9     MPV   10.6     Nitrite, UA Negative       nRBC   0     Occult Blood UA Negative       pH, UA 7.0       Phosphorus   3.2     Platelets   357     Potassium   3.8     Total Protein   6.8     Protein,  "UA Negative  Comment:  Recommend a 24 hour urine protein or a urine   protein/creatinine ratio if globulin induced proteinuria is  clinically suspected.         RBC   3.97     RDW   15.9     Sodium   135     Specific [place], UA 1.015       Specimen UA Urine, Catheterized       WBC   7.64           Significant Imaging: I have reviewed all pertinent imaging results/findings within the past 24 hours.    Assessment/Plan:     * Immobility    Pt reports that she has become progressively immobile over past few months (~ 5 months worse over past 2 months)  Does not recall and inciting event or trauma however she s had arthritis of the knee  Currently she relies on family for ADL which is requesting placement for pt  Consult PT and OT and case management        Localized edema    R hand edema and weakness  Unclear exact etiology  MRI brain ana maria howed "No evidence of acute intracranial pathology"  US showed "No thrombus in central veins of the right upper extremity."  Pt was supposed to have outpt EMG done by neurology  PT and OT       Type 2 diabetes mellitus without complication    Holding home sitagliptin and metformin  continue w SSI       Hyperlipidemia    Continue home dose statin       Essential hypertension    Continue home dose coreg and lisinopril         VTE Risk Mitigation (From admission, onward)        Ordered     enoxaparin injection 40 mg  Daily      12/14/18 1741     IP VTE HIGH RISK PATIENT  Once      12/14/18 1741     Place SOHEILA hose  Until discontinued      12/14/18 1741     IP VTE HIGH RISK PATIENT  Once      12/14/18 1741             Chrissy Ellington MD  Department of Hospital Medicine   [facility]  "

## 2018-12-14 NOTE — ASSESSMENT & PLAN NOTE
Pt reports that she has become progressively immobile over past few months (~ 5 months worse over past 2 months)  Does not recall and inciting event or trauma however she s had arthritis of the knee  Currently she relies on family for ADL which is requesting placement for pt  Consult PT and OT and case management

## 2018-12-15 PROBLEM — D64.9 NORMOCYTIC ANEMIA: Status: ACTIVE | Noted: 2018-01-01

## 2018-12-15 NOTE — ASSESSMENT & PLAN NOTE
Gait disturbance    Pt reports that she has become progressively immobile over past few months (~ 5 months worse over past 2 months)  Does not recall and inciting event or trauma however she s had arthritis of the knee  Currently she relies on family for ADL which is requesting placement for pt  Consult PT and OT and case management

## 2018-12-15 NOTE — PROGRESS NOTES
"[facility] Hospital Medicine  Progress Note    Patient Name: Deisi Cheung  MRN: [id number]  Patient Class: OP- Observation   Admission Date: 12/14/2018  Length of Stay: 0 days  Attending Physician: Rayna Stevenson MD  Primary Care Provider: Santos Mena MD    [facility] Medicine Team: [facility] MED F Unique Quispe PA-C    Subjective:     Principal Problem:Immobility    HPI:  Pt is a 82 YO lady with HTN, HLD, DM2 and arthritis who presented with complaint of presistant R arm weakness and R hand swelling for past 5 months. She denies injury or trauma. She has weakness of the entire RUE. She has difficulty with active shoulder, elbow, wrist, and finger motion.She has flexion contracture of the fingers. She notes edema of the hand which is improved with elevation.  She is able to passively extend them to some degree however limited by pain.  She denies notable numbness or tingling. Confusion, trauma, swelling in any other extremities any rashes, fever or chills, neck pain,  hx of knownCVA. She was evaluated by orthopedics in past w  prior xrays which are unremarkable. So she was sent to neurology for further eval and recommended an EMG which has not been done yet. On presentation to ED she had an MRI brain done that showed " No evidence of acute intracranial pathology.Moderate generalized cerebral volume loss and moderate chronic microvascular ischemic changes" and US of RUE that showed "No thrombus in central veins of the right upper extremity."   Of note pt has been bed bound and not ambulatory for months (no clear inciting events) she has worked with PT and OT in past for her knee pain but has progressively has become bed bound and relies on her family for her ADLs. Family reports that they can no longer meet all her need and are requesting admission for placement to nursing home. We will admit for safe dispo planning, SPK has been ordered     Hospital Course:  Mrs. Cheung was admitted for " nursing home placement. H/H stable. Will work with SW for placement.    Interval History: Patient reports pain in her right arm was improved after medication was rubbed on arm. Reports she wants to go to rehab facility for short stay and would like to get back home. Per ED note, patient can no longer care for herself and family is unable to care for her any longer.    Review of Systems   Constitutional: Negative for activity change, appetite change, chills and diaphoresis.   HENT: Negative for congestion, facial swelling and rhinorrhea.    Respiratory: Negative for apnea, cough, choking, chest tightness and shortness of breath.    Cardiovascular: Negative for chest pain, palpitations and leg swelling.   Gastrointestinal: Negative for abdominal pain, constipation, diarrhea, nausea and vomiting.   Genitourinary: Negative for dysuria, frequency and hematuria.   Musculoskeletal: Positive for arthralgias. Negative for back pain and myalgias.   Skin: Negative for color change, pallor and rash.   Neurological: Positive for weakness. Negative for numbness and headaches.   Psychiatric/Behavioral: Negative for agitation, behavioral problems and confusion.     Objective:     Vital Signs (Most Recent):  Temp: 98 °F (36.7 °C) (12/15/18 0859)  Pulse: 79 (12/15/18 0859)  Resp: 18 (12/15/18 0859)  BP: (!) 195/87 (12/15/18 0900)  SpO2: (!) 93 % (12/15/18 0859) Vital Signs (24h Range):  Temp:  [98 °F (36.7 °C)-98.7 °F (37.1 °C)] 98 °F (36.7 °C)  Pulse:  [] 79  Resp:  [18] 18  SpO2:  [93 %-99 %] 93 %  BP: (141-202)/() 195/87     Weight: 64.3 kg (141 lb 12.1 oz)  Body mass index is 25.93 kg/m².  No intake or output data in the 24 hours ending 12/15/18 1152   Physical Exam   Constitutional: She is oriented to person, place, and time. She appears well-developed. No distress.   Frail elderly lady   HENT:   Head: Normocephalic and atraumatic.   Eyes: EOM are normal. Pupils are equal, round, and reactive to light.   Neck: Normal  range of motion. Neck supple.   Cardiovascular: Normal rate, regular rhythm, normal heart sounds and intact distal pulses.   Pulmonary/Chest: Effort normal and breath sounds normal. She has no wheezes. She exhibits no tenderness.   Abdominal: Soft. Bowel sounds are normal. There is no tenderness. There is no guarding.   Musculoskeletal:   R hand is significantly more swollen than L, had contracture of R fingers and elbow, very limited active movement of R arm or hand , has some passive ROM but limited by pain    Neurological: She is alert and oriented to person, place, and time. No cranial nerve deficit.   Skin: Skin is warm and dry. She is not diaphoretic.   Psychiatric: She has a normal mood and affect. Her behavior is normal.   Nursing note and vitals reviewed.      Significant Labs:   CBC:   Recent Labs   Lab 12/14/18  1151 12/15/18  0413   WBC 7.64 6.69   HGB 10.8* 10.0*   HCT 33.8* 31.3*   * 379*     CMP:   Recent Labs   Lab 12/14/18  1151 12/15/18  0413   * 136   K 3.8 3.3*   CL 97 100   CO2 25 28   * 115*   BUN 17 15   CREATININE 0.7 0.6   CALCIUM 9.1 9.0   PROT 6.8  --    ALBUMIN 3.0*  --    BILITOT 0.3  --    ALKPHOS 77  --    AST 11  --    ALT 7*  --    ANIONGAP 13 8   EGFRNONAA >60.0 >60.0       Significant Imaging: I have reviewed all pertinent imaging results/findings within the past 24 hours.    Assessment/Plan:      * Immobility    Gait disturbance    Pt reports that she has become progressively immobile over past few months (~ 5 months worse over past 2 months)  Does not recall and inciting event or trauma however she s had arthritis of the knee  Currently she relies on family for ADL which is requesting placement for pt  Consult PT and OT and case management      Normocytic anemia    H/H 10/31.3, close to baseline  - 11/23: B12 175, Ferritin 90, Transferrin 185, Sat Fe: 13, Fe: 35, TIBC: 274  - B12 supplement started  - Folate level ordered     Localized edema    R hand edema  "and weakness  Unclear exact etiology  MRI brain ana maria howed "No evidence of acute intracranial pathology"  US showed "No thrombus in central veins of the right upper extremity."  Pt was supposed to have outpt EMG done by neurology  PT and OT     Type 2 diabetes mellitus without complication    Holding home sitagliptin and metformin  continue w SSI     Hyperlipidemia    Continue home dose statin     Essential hypertension    Continue home dose coreg and lisinopril       VTE Risk Mitigation (From admission, onward)        Ordered     enoxaparin injection 40 mg  Daily      12/14/18 1741     IP VTE HIGH RISK PATIENT  Once      12/14/18 1741     Place SOHEILA hose  Until discontinued      12/14/18 1741     IP VTE HIGH RISK PATIENT  Once      12/14/18 1741              Unique Quispe PA-C  Department of Hospital Medicine   [facility]  "

## 2018-12-15 NOTE — PLAN OF CARE
Problem: Adult Inpatient Plan of Care  Goal: Plan of Care Review  Outcome: Ongoing (interventions implemented as appropriate)  Patient AAOX4 safety and infection precautions taken. Pt voices no complaints of pain/discomfort at this time. Will cont to monitor.

## 2018-12-15 NOTE — SUBJECTIVE & OBJECTIVE
Interval History: Patient reports pain in her right arm was improved after medication was rubbed on arm. Reports she wants to go to rehab facility for short stay and would like to get back home. Per ED note, patient can no longer care for herself and family is unable to care for her any longer.    Review of Systems   Constitutional: Negative for activity change, appetite change, chills and diaphoresis.   HENT: Negative for congestion, facial swelling and rhinorrhea.    Respiratory: Negative for apnea, cough, choking, chest tightness and shortness of breath.    Cardiovascular: Negative for chest pain, palpitations and leg swelling.   Gastrointestinal: Negative for abdominal pain, constipation, diarrhea, nausea and vomiting.   Genitourinary: Negative for dysuria, frequency and hematuria.   Musculoskeletal: Positive for arthralgias. Negative for back pain and myalgias.   Skin: Negative for color change, pallor and rash.   Neurological: Positive for weakness. Negative for numbness and headaches.   Psychiatric/Behavioral: Negative for agitation, behavioral problems and confusion.     Objective:     Vital Signs (Most Recent):  Temp: 98 °F (36.7 °C) (12/15/18 0859)  Pulse: 79 (12/15/18 0859)  Resp: 18 (12/15/18 0859)  BP: (!) 195/87 (12/15/18 0900)  SpO2: (!) 93 % (12/15/18 0859) Vital Signs (24h Range):  Temp:  [98 °F (36.7 °C)-98.7 °F (37.1 °C)] 98 °F (36.7 °C)  Pulse:  [] 79  Resp:  [18] 18  SpO2:  [93 %-99 %] 93 %  BP: (141-202)/() 195/87     Weight: 64.3 kg (141 lb 12.1 oz)  Body mass index is 25.93 kg/m².  No intake or output data in the 24 hours ending 12/15/18 1152   Physical Exam   Constitutional: She is oriented to person, place, and time. She appears well-developed. No distress.   Frail elderly lady   HENT:   Head: Normocephalic and atraumatic.   Eyes: EOM are normal. Pupils are equal, round, and reactive to light.   Neck: Normal range of motion. Neck supple.   Cardiovascular: Normal rate, regular  rhythm, normal heart sounds and intact distal pulses.   Pulmonary/Chest: Effort normal and breath sounds normal. She has no wheezes. She exhibits no tenderness.   Abdominal: Soft. Bowel sounds are normal. There is no tenderness. There is no guarding.   Musculoskeletal:   R hand is significantly more swollen than L, had contracture of R fingers and elbow, very limited active movement of R arm or hand , has some passive ROM but limited by pain    Neurological: She is alert and oriented to person, place, and time. No cranial nerve deficit.   Skin: Skin is warm and dry. She is not diaphoretic.   Psychiatric: She has a normal mood and affect. Her behavior is normal.   Nursing note and vitals reviewed.      Significant Labs:   CBC:   Recent Labs   Lab 12/14/18  1151 12/15/18  0413   WBC 7.64 6.69   HGB 10.8* 10.0*   HCT 33.8* 31.3*   * 379*     CMP:   Recent Labs   Lab 12/14/18  1151 12/15/18  0413   * 136   K 3.8 3.3*   CL 97 100   CO2 25 28   * 115*   BUN 17 15   CREATININE 0.7 0.6   CALCIUM 9.1 9.0   PROT 6.8  --    ALBUMIN 3.0*  --    BILITOT 0.3  --    ALKPHOS 77  --    AST 11  --    ALT 7*  --    ANIONGAP 13 8   EGFRNONAA >60.0 >60.0       Significant Imaging: I have reviewed all pertinent imaging results/findings within the past 24 hours.

## 2018-12-15 NOTE — PT/OT/SLP EVAL
"Physical Therapy Evaluation and Discharge Note    Patient Name:  Deisi Cheung   MRN:  [id number]    Recommendations:     Discharge Recommendations:  (HHPT)   Discharge Equipment Recommendations: none   Barriers to discharge: None    Assessment:     Deisi Cheung is a 83 y.o. female admitted with a medical diagnosis of Immobility. .  At this time, patient is functioning at their prior level of function and does not require further acute PT services.     Recent Surgery: * No surgery found *      Plan:     During this hospitalization, patient does not require further acute PT services.  Please re-consult if situation changes.      Subjective     Chief Complaint: fatigue  Patient/Family Comments/goals: "I can't bend my knees much because it'll hurt."  Pain/Comfort:  · Pain Rating 1: 0/10    Patients cultural, spiritual, Christianity conflicts given the current situation: no    Living Environment:  Pt lives alone but daughter and grandchildren visits frequently in [facility].  PTA no falls.  Prior to admission, patients level of function was requiring assistance c transfers but able to manage self-care once in .  Equipment used at home: walker, rolling, wheelchair, rollator, bedside commode, shower chair.  DME owned (not currently used): none.  Upon discharge, patient will have assistance from family.    Objective:     Communicated with RN prior to session.  Patient found UIC upon PT entry to room found with: peripheral IV     General Precautions: Standard, fall   Orthopedic Precautions:N/A   Braces: N/A     Exams:  · Cognitive Exam:  Patient is oriented to Person, Place, Time and Situation  · Gross Motor Coordination:  WFL  · Sensation:    · -       Intact  · RLE ROM: limited knee flexion and ankle DF ROM  · RLE Strength: unable to formally assess d/t c/o pain c resistance  · LLE ROM: limited knee flexion d/t pain and ankle DF ROM  · LLE Strength: unable to formally assess d/t c/o pain c resistance    Functional " Mobility:  · Transfers:     · Sit to Stand:  maximal assistance with hand-held assist  · Balance: sitting (I)    AM-PAC 6 CLICK MOBILITY  Total Score:11       Therapeutic Activities and Exercises:   Pt educated on: PT role/POC; safety c mobility; benefits of OOB activities; performing therex; d/c recs - v/u      AM-PAC 6 CLICK MOBILITY  Total Score:11     Patient left up in chair with all lines intact, call button in reach and RN notified.    GOALS:   Multidisciplinary Problems     Physical Therapy Goals     Not on file          Multidisciplinary Problems (Resolved)        Problem: Physical Therapy Goal    Goal Priority Disciplines Outcome Goal Variances Interventions   Physical Therapy Goal   (Resolved)     PT, PT/OT Outcome(s) achieved     Description:  Pt does not require additional acute PT services at this time d/t baseline c functional mobility.     Pt educated on asking medical staff for PT consult if changes in functional status occurs. - v/u                          History:     Past Medical History:   Diagnosis Date    Arthritis of right knee     Diabetes mellitus     Hyperlipidemia     Hypertension        Past Surgical History:   Procedure Laterality Date    none         Clinical Decision Making:     History  Co-morbidities and personal factors that may impact the plan of care Examination  Body Structures and Functions, activity limitations and participation restrictions that may impact the plan of care Clinical Presentation   Decision Making/ Complexity Score   Co-morbidities:   [] Time since onset of injury / illness / exacerbation  [] Status of current condition  []Patient's cognitive status and safety concerns    [] Multiple Medical Problems (see med hx)  Personal Factors:   [] Patient's age  [] Prior Level of function   [] Patient's home situation (environment and family support)  [] Patient's level of motivation  [] Expected progression of patient      HISTORY:(criteria)    [x] 83134 - no  personal factors/history    [] 54724 - has 1-2 personal factor/comorbidity     [] 32885 - has >3 personal factor/comorbidity     Body Regions:  [] Objective examination findings  [] Head     []  Neck  [] Trunk   [] Upper Extremity  [] Lower Extremity    Body Systems:  [] For communication ability, affect, cognition, language, and learning style: the assessment of the ability to make needs known, consciousness, orientation (person, place, and time), expected emotional /behavioral responses, and learning preferences (eg, learning barriers, education  needs)  [] For the neuromuscular system: a general assessment of gross coordinated movement (eg, balance, gait, locomotion, transfers, and transitions) and motor function  (motor control and motor learning)  [] For the musculoskeletal system: the assessment of gross symmetry, gross range of motion, gross strength, height, and weight  [] For the integumentary system: the assessment of pliability(texture), presence of scar formation, skin color, and skin integrity  [] For cardiovascular/pulmonary system: the assessment of heart rate, respiratory rate, blood pressure, and edema     Activity limitations:    [] Patient's cognitive status and saf ety concerns          [] Status of current condition      [] Weight bearing restriction  [] Cardiopulmunary Restriction    Participation Restrictions:   [] Goals and goal agreement with the patient     [] Rehab potential (prognosis) and probable outcome      Examination of Body System: (criteria)    [x] 45089 - addressing 1-2 elements    [] 73801 - addressing a total of 3 or more elements     [] 86364 -  Addressing a total of 4 or more elements         Clinical Presentation: (criteria)  Stable - 95149     On examination of body system using standardized tests and measures patient presents with 1-2 elements from any of the following: body structures and functions, activity limitations, and/or participation restrictions.  Leading to a  clinical presentation that is considered stable and/or uncomplicated                              Clinical Decision Making  (Eval Complexity):  Low- 30150     Time Tracking:     PT Received On: 12/15/18  PT Start Time: 1045     PT Stop Time: 1055  PT Total Time (min): 10 min     Billable Minutes: Evaluation 10 min      Kasi Baker, PT  12/15/2018

## 2018-12-15 NOTE — NURSING
Pt arrived to floor via stretcher, assisted to by 2 RN's, pt a&o x3, BP elevated coreg given that was due at 1845 at 2130, swelling to rt arm and hand 2+ edema, pt able to move it slightly, call light in reach, side rails up x 2 for safety, will cont to monitor pt.

## 2018-12-15 NOTE — ASSESSMENT & PLAN NOTE
H/H 10/31.3, close to baseline  - 11/23: B12 175, Ferritin 90, Transferrin 185, Sat Fe: 13, Fe: 35, TIBC: 274  - B12 supplement started  - Folate level ordered

## 2018-12-15 NOTE — PLAN OF CARE
Problem: Physical Therapy Goal  Goal: Physical Therapy Goal  Pt does not require additional acute PT services at this time d/t baseline c functional mobility.     Pt educated on asking medical staff for PT consult if changes in functional status occurs. - v/u        Outcome: Outcome(s) achieved Date Met: 12/15/18  Eval and D/C from acute PT services    Kasi Baker DPT  12/15/2018

## 2018-12-15 NOTE — PLAN OF CARE
Problem: Adult Inpatient Plan of Care  Goal: Plan of Care Review  Outcome: Ongoing (interventions implemented as appropriate)  Pt resting in bed, pt incont of bowel and bladder, pt cleaned and pericare given to pt, rt arm and hand swollen, elevated on pillow at times but pt states it hurts when elevated, call light in reach, side rails up x 2 for safety, will cont to monitor pt.

## 2018-12-15 NOTE — HOSPITAL COURSE
Mrs. Cheung was admitted for nursing home placement and Neurology evaluation as to cause of progressive immobility and loss of use of right arm and leg weakness. H/H at baseline with anemia. B12 low, started on supplement. Patient hypertensive in absence of pain and started on Norvasc 10 mg po daily to treat. Neurology consulted, MRI of C-spine ordered and returned with only mild DJD of cervical spine but no spinal stenosis and does not explain patient's neurologic symptoms. EMG and NCV ordered on 12/18 to further work-up cause of progressive weakness. PT/OT consulted, recommended SNF placement and  working with family on placement choices. Pain to right arm improved with placement of splint to right hand and forearm to help with contractures. CPK normal at 61. Repeat MRI of thoracic and cervical spine with contrast done on 12/19 as per Neurology recs to make sure no structural cause of neurologic symptoms and repeat MRI unremarkable for any significant disc disease or spinal stenosis to explain patient's neurologic symptoms. Neurology states NCV/EMG can be done as outpatient as low suspicion for motor neuron disease.     PT/OT evaluated the patient.  We tried for SNF placement, but due to the more chronic nature of her disease, PHN has denied SNF.  PEER to PEER done without change.  Pt will either need to go home with family and 24 hour care or [facility] nursing care.  Social work assisting.

## 2018-12-16 NOTE — ASSESSMENT & PLAN NOTE
Currently 137/78, but hypertensive this am regardless of improvement of pain  - continue home meds: Lisinopril 40mg, Coreg 25 mg  - added Norvasc 10 mg

## 2018-12-16 NOTE — PLAN OF CARE
Problem: Adult Inpatient Plan of Care  Goal: Plan of Care Review  Outcome: Ongoing (interventions implemented as appropriate)  Patient AAOX4 safety and infection precautions taken. Pt c/o pain in right arm, pain medications given, and comfort measures taken to relieve the pain at this time. Will cont to monitor.

## 2018-12-16 NOTE — PROGRESS NOTES
"[facility] Hospital Medicine  Progress Note    Patient Name: Deisi Cheung  MRN: [id number]  Patient Class: OP- Observation   Admission Date: 12/14/2018  Length of Stay: 0 days  Attending Physician: Rayna Stevenson MD  Primary Care Provider: Santos Mena MD    [facility] Medicine Team: [facility]OSP MED F Unique Quispe PA-C    Subjective:     Principal Problem:Immobility    HPI:  Pt is a 82 YO lady with HTN, HLD, DM2 and arthritis who presented with complaint of presistant R arm weakness and R hand swelling for past 5 months. She denies injury or trauma. She has weakness of the entire RUE. She has difficulty with active shoulder, elbow, wrist, and finger motion.She has flexion contracture of the fingers. She notes edema of the hand which is improved with elevation.  She is able to passively extend them to some degree however limited by pain.  She denies notable numbness or tingling. Confusion, trauma, swelling in any other extremities any rashes, fever or chills, neck pain,  hx of knownCVA. She was evaluated by orthopedics in past w  prior xrays which are unremarkable. So she was sent to neurology for further eval and recommended an EMG which has not been done yet. On presentation to ED she had an MRI brain done that showed " No evidence of acute intracranial pathology.Moderate generalized cerebral volume loss and moderate chronic microvascular ischemic changes" and US of RUE that showed "No thrombus in central veins of the right upper extremity."   Of note pt has been bed bound and not ambulatory for months (no clear inciting events) she has worked with PT and OT in past for her knee pain but has progressively has become bed bound and relies on her family for her ADLs. Family reports that they can no longer meet all her need and are requesting admission for placement to nursing home. We will admit for safe dispo planning, SPK has been ordered     Hospital Course:  Mrs. Cheung was admitted for " nursing home placement. PT consulted. H/H at baseline with anemia. B12 low, started on supplement. Patient hypertensive in absence of pain, added Norvasc 10. Will work with SW for placement.    Interval History: Patient reports improvement with pain in right arm. Told patient pain medication is available and to ask nurse if in pain. Added capsaicin cream to help with pain.     Review of Systems   Constitutional: Negative for diaphoresis and fatigue.   HENT: Negative for congestion and rhinorrhea.    Respiratory: Negative for cough, chest tightness and shortness of breath.    Cardiovascular: Negative for chest pain, palpitations and leg swelling.   Gastrointestinal: Negative for abdominal pain, constipation, diarrhea, nausea and vomiting.   Genitourinary: Negative for dysuria, frequency and hematuria.   Musculoskeletal: Positive for arthralgias. Negative for back pain and myalgias.   Skin: Negative for color change, pallor and rash.   Neurological: Positive for weakness. Negative for numbness and headaches.   Psychiatric/Behavioral: Negative for agitation, behavioral problems and confusion.     Objective:     Vital Signs (Most Recent):  Temp: 96.1 °F (35.6 °C) (12/16/18 1224)  Pulse: 75 (12/16/18 1224)  Resp: 18 (12/16/18 1224)  BP: 137/78 (12/16/18 1224)  SpO2: 95 % (12/16/18 1224) Vital Signs (24h Range):  Temp:  [96.1 °F (35.6 °C)-99 °F (37.2 °C)] 96.1 °F (35.6 °C)  Pulse:  [70-84] 75  Resp:  [14-18] 18  SpO2:  [95 %-100 %] 95 %  BP: (135-172)/() 137/78     Weight: 64.3 kg (141 lb 12.1 oz)  Body mass index is 25.93 kg/m².  No intake or output data in the 24 hours ending 12/16/18 1338   Physical Exam   Constitutional: She is oriented to person, place, and time. She appears well-developed. No distress.   Frail elderly lady   HENT:   Head: Normocephalic and atraumatic.   Eyes: EOM are normal. Pupils are equal, round, and reactive to light.   Neck: Normal range of motion. Neck supple.   Cardiovascular: Normal  "rate, regular rhythm, normal heart sounds and intact distal pulses.   Pulmonary/Chest: Effort normal and breath sounds normal. She has no wheezes. She exhibits no tenderness.   Abdominal: Soft. Bowel sounds are normal. There is no tenderness. There is no guarding.   Musculoskeletal:   R hand is significantly more swollen than L, had contracture of R fingers and elbow, very limited active movement of R arm or hand , has some passive ROM but limited by pain    Neurological: She is alert and oriented to person, place, and time. No cranial nerve deficit.   Skin: Skin is warm and dry. She is not diaphoretic.   Psychiatric: She has a normal mood and affect. Her behavior is normal.   Nursing note and vitals reviewed.      Significant Labs:   CBC:   Recent Labs   Lab 12/15/18  0413 12/16/18  0555   WBC 6.69 8.09   HGB 10.0* 9.8*   HCT 31.3* 31.2*   * 369*     CMP:   Recent Labs   Lab 12/15/18  0413 12/16/18  0555    136   K 3.3* 3.8    100   CO2 28 26   * 135*   BUN 15 14   CREATININE 0.6 0.6   CALCIUM 9.0 9.2   ANIONGAP 8 10   EGFRNONAA >60.0 >60.0       Significant Imaging: I have reviewed all pertinent imaging results/findings within the past 24 hours.    Assessment/Plan:      * Immobility    Gait disturbance    Pt reports that she has become progressively immobile over past few months (~ 5 months worse over past 2 months)  Does not recall and inciting event or trauma however she s had arthritis of the knee  Currently she relies on family for ADL which is requesting placement for pt  Consult PT and OT and case management      Normocytic anemia    H/H 9.8/31.2, close to baseline  - 11/23: B12 175, Ferritin 90, Transferrin 185, Sat Fe: 13, Fe: 35, TIBC: 274  - B12 supplement started  - Folate level WNL     Localized edema    R hand edema and weakness, pain improved  Unclear exact etiology  MRI brain ana maria howed "No evidence of acute intracranial pathology"  US showed "No thrombus in central veins " "of the right upper extremity."  Pt was supposed to have outpt EMG done by neurology  PT and OT  Pain medication PRN  Topical capsaicin      Type 2 diabetes mellitus without complication    Holding home sitagliptin and metformin  continue w SSI     Hyperlipidemia    Continue home dose statin     Essential hypertension    Currently 137/78, but hypertensive this am regardless of improvement of pain  - continue home meds: Lisinopril 40mg, Coreg 25 mg  - added Norvasc 10 mg        VTE Risk Mitigation (From admission, onward)        Ordered     enoxaparin injection 40 mg  Daily      12/14/18 1741     Place SOHEILA hose  Until discontinued      12/14/18 1741     IP VTE HIGH RISK PATIENT  Once      12/14/18 1741              Unique Quispe PA-C  Department of Hospital Medicine   [facility]  "

## 2018-12-16 NOTE — ASSESSMENT & PLAN NOTE
"R hand edema and weakness, pain improved  Unclear exact etiology  MRI brain ana maria howed "No evidence of acute intracranial pathology"  US showed "No thrombus in central veins of the right upper extremity."  Pt was supposed to have outpt EMG done by neurology  PT and OT  Pain medication PRN  Topical capsaicin   "

## 2018-12-16 NOTE — SUBJECTIVE & OBJECTIVE
Interval History: Patient reports improvement with pain in right arm. Told patient pain medication is available and to ask nurse if in pain. Added capsaicin cream to help with pain.     Review of Systems   Constitutional: Negative for diaphoresis and fatigue.   HENT: Negative for congestion and rhinorrhea.    Respiratory: Negative for cough, chest tightness and shortness of breath.    Cardiovascular: Negative for chest pain, palpitations and leg swelling.   Gastrointestinal: Negative for abdominal pain, constipation, diarrhea, nausea and vomiting.   Genitourinary: Negative for dysuria, frequency and hematuria.   Musculoskeletal: Positive for arthralgias. Negative for back pain and myalgias.   Skin: Negative for color change, pallor and rash.   Neurological: Positive for weakness. Negative for numbness and headaches.   Psychiatric/Behavioral: Negative for agitation, behavioral problems and confusion.     Objective:     Vital Signs (Most Recent):  Temp: 96.1 °F (35.6 °C) (12/16/18 1224)  Pulse: 75 (12/16/18 1224)  Resp: 18 (12/16/18 1224)  BP: 137/78 (12/16/18 1224)  SpO2: 95 % (12/16/18 1224) Vital Signs (24h Range):  Temp:  [96.1 °F (35.6 °C)-99 °F (37.2 °C)] 96.1 °F (35.6 °C)  Pulse:  [70-84] 75  Resp:  [14-18] 18  SpO2:  [95 %-100 %] 95 %  BP: (135-172)/() 137/78     Weight: 64.3 kg (141 lb 12.1 oz)  Body mass index is 25.93 kg/m².  No intake or output data in the 24 hours ending 12/16/18 1338   Physical Exam   Constitutional: She is oriented to person, place, and time. She appears well-developed. No distress.   Frail elderly lady   HENT:   Head: Normocephalic and atraumatic.   Eyes: EOM are normal. Pupils are equal, round, and reactive to light.   Neck: Normal range of motion. Neck supple.   Cardiovascular: Normal rate, regular rhythm, normal heart sounds and intact distal pulses.   Pulmonary/Chest: Effort normal and breath sounds normal. She has no wheezes. She exhibits no tenderness.   Abdominal: Soft.  Bowel sounds are normal. There is no tenderness. There is no guarding.   Musculoskeletal:   R hand is significantly more swollen than L, had contracture of R fingers and elbow, very limited active movement of R arm or hand , has some passive ROM but limited by pain    Neurological: She is alert and oriented to person, place, and time. No cranial nerve deficit.   Skin: Skin is warm and dry. She is not diaphoretic.   Psychiatric: She has a normal mood and affect. Her behavior is normal.   Nursing note and vitals reviewed.      Significant Labs:   CBC:   Recent Labs   Lab 12/15/18  0413 12/16/18  0555   WBC 6.69 8.09   HGB 10.0* 9.8*   HCT 31.3* 31.2*   * 369*     CMP:   Recent Labs   Lab 12/15/18  0413 12/16/18  0555    136   K 3.3* 3.8    100   CO2 28 26   * 135*   BUN 15 14   CREATININE 0.6 0.6   CALCIUM 9.0 9.2   ANIONGAP 8 10   EGFRNONAA >60.0 >60.0       Significant Imaging: I have reviewed all pertinent imaging results/findings within the past 24 hours.

## 2018-12-16 NOTE — ASSESSMENT & PLAN NOTE
H/H 9.8/31.2, close to baseline  - 11/23: B12 175, Ferritin 90, Transferrin 185, Sat Fe: 13, Fe: 35, TIBC: 274  - B12 supplement started  - Folate level WNL

## 2018-12-17 NOTE — PLAN OF CARE
12/17/18 1341   Post-Acute Status   Post-Acute Authorization Placement   Post-Acute Placement Status Patient List Provided     SW was informed by the PA that the pt's family is unable to care for the pt in her current state of health.  SHe has declined over the past few months.  They want her to go to a SNF and possible transition to jail care.  CASTILLO spoke with the pt's dtr Erum ([phone]).  She asked that the SW call her sister in law Kayli ([phone]) to discuss options.  SHe stated that she would like to meet with the SW or CM tomorrow with her , the pt's son, to discuss options.  CASTILLO explained that the pt could go to SNF for a few weeks and transition to jail.  She stated that they would likely be interested but want to meet tomorrow about it.  CASTILLO had reviewed the PHN list with her as they want [facility].  She was agreeable to a referral being send to [facility] today and will look at the list tomorrow with her .  CASTILLO sent a referral request to the [facility] and will f/u tomorrow.  CASTILLO called in the locet and faxed the pasrr.  CASTILLO is waiting on the 142.    Annia Parks, [facility] x [phone]

## 2018-12-17 NOTE — SUBJECTIVE & OBJECTIVE
Past Medical History:   Diagnosis Date    Arthritis of right knee     Diabetes mellitus     Hyperlipidemia     Hypertension      Past Surgical History:   Procedure Laterality Date    none       Review of patient's allergies indicates:   Allergen Reactions    Pcn [penicillins]      No current facility-administered medications on file prior to encounter.      Current Outpatient Medications on File Prior to Encounter   Medication Sig    alendronate (FOSAMAX) 70 MG tablet Take 1 tablet (70 mg total) by mouth every 7 days.    aspirin (ECOTRIN) 81 MG EC tablet Take 81 mg by mouth once daily.    atorvastatin (LIPITOR) 40 MG tablet Take 40 mg by mouth once daily.    carvedilol (COREG) 25 MG tablet Take 1 tablet (25 mg total) by mouth 2 (two) times daily with meals.    lisinopril (PRINIVIL,ZESTRIL) 40 MG tablet Take 40 mg by mouth once daily.    magnesium oxide (MAG-OX) 400 mg (241.3 mg magnesium) tablet Take 400 mg by mouth once daily.    metformin (GLUCOPHAGE-XR) 500 MG 24 hr tablet Take 500 mg by mouth 2 (two) times daily with meals.    SITagliptin (JANUVIA) 50 MG Tab Take 100 mg by mouth once daily.     Family History     Problem Relation (Age of Onset)    Diabetes Sister    Heart failure Sister    Prostate cancer Brother    Stroke Sister        Tobacco Use    Smoking status: Never Smoker    Smokeless tobacco: Never Used   Substance and Sexual Activity    Alcohol use: No     Alcohol/week: 0.0 oz     Frequency: Never    Drug use: No    Sexual activity: No     Review of Systems   Constitutional: Negative for fever.   HENT: Negative for tinnitus, trouble swallowing and voice change.    Eyes: Negative for visual disturbance.   Respiratory: Negative for shortness of breath.    Cardiovascular: Negative for chest pain.   Gastrointestinal: Negative for abdominal pain.   Genitourinary: Negative for difficulty urinating.   Musculoskeletal: Positive for gait problem. Negative for arthralgias.   Neurological:  Positive for weakness. Negative for tremors, facial asymmetry, speech difficulty and headaches.   Psychiatric/Behavioral: Negative for decreased concentration.     Objective:     Vital Signs (Most Recent):  Temp: 98.8 °F (37.1 °C) (12/17/18 0744)  Pulse: 70 (12/17/18 0744)  Resp: 20 (12/17/18 0744)  BP: (!) 157/79 (12/17/18 0744)  SpO2: 95 % (12/17/18 0744) Vital Signs (24h Range):  Temp:  [96.1 °F (35.6 °C)-98.8 °F (37.1 °C)] 98.8 °F (37.1 °C)  Pulse:  [69-85] 70  Resp:  [16-20] 20  SpO2:  [95 %-98 %] 95 %  BP: (122-157)/(62-79) 157/79     Weight: 64.3 kg (141 lb 12.1 oz)  Body mass index is 25.93 kg/m².    Physical Exam   Constitutional: She is oriented to person, place, and time.   cachetic   HENT:   Head: Normocephalic and atraumatic.   Eyes: EOM are normal. Pupils are equal, round, and reactive to light.   Neurological: She is oriented to person, place, and time.   Reflex Scores:       Tricep reflexes are 3+ on the right side and 3+ on the left side.       Bicep reflexes are 3+ on the right side and 3+ on the left side.       Patellar reflexes are 2+ on the right side and 2+ on the left side.       Achilles reflexes are 0 on the right side and 0 on the left side.  Psychiatric: Her speech is normal.       NEUROLOGICAL EXAMINATION:     MENTAL STATUS   Oriented to person, place, and time.   Follows 2 step commands.   Speech: speech is normal   Level of consciousness: alert  Knowledge: good.   Normal comprehension.     CRANIAL NERVES     CN II   Visual fields full to confrontation.     CN III, IV, VI   Pupils are equal, round, and reactive to light.  Extraocular motions are normal.   Nystagmus: none   Ophthalmoparesis: none    CN V   Facial sensation intact.     CN VII   Facial expression full, symmetric.     CN VIII   Hearing: intact    CN XII   Fasciculations: absent    MOTOR EXAM   Muscle bulk: decreased  Overall muscle tone: increased  Right arm tone: spastic  Left arm tone: increased  Right leg tone:  increased  Left leg tone: increased    Strength   Right deltoid: 0/5  Left deltoid: 4/5  Right biceps: 0/5  Left biceps: 4/5  Right triceps: 0/5  Left triceps: 4/5  Right wrist extension: 0/5  Left wrist extension: 4/5  Right hamstring: 3/5  Left hamstring: 3/5  Right anterior tibial: 3/5  Left anterior tibial: 3/5  Right gastroc: 3/5  Left gastroc: 3/5       fasciculations in the left deltoid, left biceps, and anterior forearm.    No tongue fasciculations    Muscle atrophy left hand and right foot     REFLEXES     Reflexes   Right biceps: 3+  Left biceps: 3+  Right triceps: 3+  Left triceps: 3+  Right patellar: 2+  Left patellar: 2+  Right achilles: 0  Left achilles: 0  Right plantar: upgoing  Left plantar: equivocal  Left Baker: present  Right ankle clonus: absent  Left ankle clonus: absent    SENSORY EXAM   Vibration normal.     GAIT AND COORDINATION     Gait  Gait: (deferred)    Tremor   Resting tremor: absent    Significant Labs:   Recent Lab Results       12/17/18  0809   12/17/18  0450   12/16/18  2104   12/16/18  1732   12/16/18  1205        Immature Granulocytes   0.8           Immature Grans (Abs)   0.06  Comment:  Mild elevation in immature granulocytes is non specific and   can be seen in a variety of conditions including stress response,   acute inflammation, trauma and pregnancy. Correlation with other   laboratory and clinical findings is essential.             Anion Gap   7           Baso #   0.06           Basophil%   0.8           BUN, Bld   16           Calcium   8.9           Chloride   102           CO2   27           Creatinine   0.6           Differential Method   Automated           eGFR if    >60.0           eGFR if non    >60.0  Comment:  Calculation used to obtain the estimated glomerular filtration  rate (eGFR) is the CKD-EPI equation.              Eos #   0.2           Eosinophil%   2.5           Glucose   143           Gran # (ANC)   4.1           Gran%    52.8           Hematocrit   33.5           Hemoglobin   10.3           Lymph #   2.5           Lymph%   32.8           Magnesium   1.6           MCH   26.5           MCHC   30.7           MCV   86           Mono #   0.8           Mono%   10.3           MPV   10.3           nRBC   0           Phosphorus   3.1           Platelets   367           POCT Glucose 135   250 144 167     Potassium   3.8           RBC   3.88           RDW   15.9           Sodium   136           WBC   7.74                              Significant Imaging:     MRI brain WO contrast (12/14/18)  No evidence of acute intracranial pathology. Moderate generalized cerebral volume loss and moderate chronic microvascular ischemic changes.    XR right hand (11/23/18)  Suboptimal positioning on the frontal and oblique views with flexion of the 2nd through 5th digits making evaluation of the middle and distal phalanges difficult..  Bones appear somewhat demineralized.  Alignment is otherwise satisfactory and joint spaces appear fairly well maintained.  No definite evidence of fracture or dislocation.  Mild degenerative changes at some of the interphalangeal joints.  There may be some mild soft tissue swelling at the wrist.

## 2018-12-17 NOTE — PROGRESS NOTES
"[facility] Hospital Medicine  Progress Note    Patient Name: Deisi Cheung  MRN: [id number]  Patient Class: OP- Observation   Admission Date: 12/14/2018  Length of Stay: 0 days  Attending Physician: GIOVANA Burrows MD  Primary Care Provider: Santos Mena MD    [facility] Medicine Team: [facility] F Unique Quispe PA-C    Subjective:     Principal Problem:Immobility    HPI:  Pt is a 84 YO lady with HTN, HLD, DM2 and arthritis who presented with complaint of presistant R arm weakness and R hand swelling for past 5 months. She denies injury or trauma. She has weakness of the entire RUE. She has difficulty with active shoulder, elbow, wrist, and finger motion.She has flexion contracture of the fingers. She notes edema of the hand which is improved with elevation.  She is able to passively extend them to some degree however limited by pain.  She denies notable numbness or tingling. Confusion, trauma, swelling in any other extremities any rashes, fever or chills, neck pain,  hx of knownCVA. She was evaluated by orthopedics in past w  prior xrays which are unremarkable. So she was sent to neurology for further eval and recommended an EMG which has not been done yet. On presentation to ED she had an MRI brain done that showed " No evidence of acute intracranial pathology.Moderate generalized cerebral volume loss and moderate chronic microvascular ischemic changes" and US of RUE that showed "No thrombus in central veins of the right upper extremity."   Of note pt has been bed bound and not ambulatory for months (no clear inciting events) she has worked with PT and OT in past for her knee pain but has progressively has become bed bound and relies on her family for her ADLs. Family reports that they can no longer meet all her need and are requesting admission for placement to nursing home. We will admit for safe dispo planning, SPK has been ordered     Hospital Course:  Mrs. Cheung was admitted for " "nursing home placement. PT consulted. H/H at baseline with anemia. B12 low, started on supplement. Patient hypertensive in absence of pain, added Norvasc 10. Neurology consulted, MRI of C-spine ordered. OT consulted, recommended SNF placement.    Interval History: Patient with 8/10 pain in right arm/hand. Hand is painful to touch, elbow is not painful, but "tingly". States pain medication makes her sleep. Neurology consulted. MRI C-spine with without contrast ordered. Asked nursing to re-weigh patient for insulin dosing.    Review of Systems   Constitutional: Negative for diaphoresis and fatigue.   HENT: Negative for congestion and rhinorrhea.    Respiratory: Negative for cough, chest tightness and shortness of breath.    Cardiovascular: Negative for chest pain, palpitations and leg swelling.   Gastrointestinal: Negative for abdominal pain, constipation, diarrhea, nausea and vomiting.   Genitourinary: Negative for dysuria, frequency and hematuria.   Musculoskeletal: Positive for arthralgias. Negative for back pain and myalgias.   Skin: Negative for color change, pallor and rash.   Neurological: Positive for weakness. Negative for numbness and headaches.   Psychiatric/Behavioral: Negative for agitation, behavioral problems and confusion.     Objective:     Vital Signs (Most Recent):  Temp: 98.6 °F (37 °C) (12/17/18 1141)  Pulse: 72 (12/17/18 1141)  Resp: 20 (12/17/18 1141)  BP: 129/72 (12/17/18 1141)  SpO2: 95 % (12/17/18 1141) Vital Signs (24h Range):  Temp:  [98 °F (36.7 °C)-98.8 °F (37.1 °C)] 98.6 °F (37 °C)  Pulse:  [69-85] 72  Resp:  [16-20] 20  SpO2:  [95 %-98 %] 95 %  BP: (122-157)/(62-79) 129/72     Weight: 64.3 kg (141 lb 12.1 oz)  Body mass index is 25.93 kg/m².    Intake/Output Summary (Last 24 hours) at 12/17/2018 1259  Last data filed at 12/17/2018 0200  Gross per 24 hour   Intake 160 ml   Output --   Net 160 ml      Physical Exam   Constitutional: She is oriented to person, place, and time. She appears " well-developed. No distress.   Frail elderly lady   HENT:   Head: Normocephalic and atraumatic.   Eyes: EOM are normal. Pupils are equal, round, and reactive to light.   Neck: Normal range of motion. Neck supple.   Cardiovascular: Normal rate and regular rhythm.   Pulmonary/Chest: Effort normal and breath sounds normal. She has no wheezes. She exhibits no tenderness.   Abdominal: Soft. Bowel sounds are normal. There is no tenderness. There is no guarding.   Musculoskeletal: She exhibits edema and tenderness.   R hand is TTP, swollen with contracture of R fingers and elbow, very limited active movement of R arm or hand, some passive ROM but limited by pain.  L elbow not tender   Neurological: She is alert and oriented to person, place, and time. No cranial nerve deficit or sensory deficit.   2/5 strength bilateral plantar flexion  1/5 strength bilateral dorsiflexion   Skin: Skin is warm and dry. She is not diaphoretic.   Psychiatric: She has a normal mood and affect. Her behavior is normal.   Nursing note and vitals reviewed.      Significant Labs:   CBC:   Recent Labs   Lab 12/16/18  0555 12/17/18  0450   WBC 8.09 7.74   HGB 9.8* 10.3*   HCT 31.2* 33.5*   * 367*     CMP:   Recent Labs   Lab 12/16/18  0555 12/17/18  0450    136   K 3.8 3.8    102   CO2 26 27   * 143*   BUN 14 16   CREATININE 0.6 0.6   CALCIUM 9.2 8.9   ANIONGAP 10 7*   EGFRNONAA >60.0 >60.0     POCT Glucose:   Recent Labs   Lab 12/16/18  2104 12/17/18  0809 12/17/18  1244   POCTGLUCOSE 250* 135* 252*       Significant Imaging: I have reviewed all pertinent imaging results/findings within the past 24 hours.    Assessment/Plan:      * Immobility    Gait disturbance  Localized edema of right hand    Pt reports that she has become progressively immobile and weak over past few months starting five months ago, worsening over past two months. Patient with flexion contracture of right hand, along with R hand edema and weakness as  "well as "tingling" pain in right elbow over the past few months that has recently worsened. Has seen orthopedics as outpatient with unremarkable radiology.Does not recall and inciting event or trauma however she has arthritis of knee.Currently she relies on family for ADL which is requesting placement for pt.  - Pt was supposed to have outpt EMG done by neurology  - PT/OT- OT recommending SNF  - MRI brain ana maria howed "No evidence of acute intracranial pathology"  - US showed "No thrombus in central veins of the right upper extremity."  - 2/5 bilateral plantar flexion  - 1/5 bilateral dorsiflexion  - neurology consulted, appreciate recs  - MRI c-spine with/ w/o contrast ordered  - Pain medication PRN and topical capsaicin     Normocytic anemia    H/H 10.3/33.5, baseline  - 11/23: B12 175, Ferritin 90, Transferrin 185, Sat Fe: 13, Fe: 35, TIBC: 274  - B12 supplement started  - Folate level WNL     Localized edema    R hand edema and weakness, pain improved  Unclear exact etiology  MRI brain ana maria howed "No evidence of acute intracranial pathology"  US showed "No thrombus in central veins of the right upper extremity."  Pt was supposed to have outpt EMG done by neurology  PT and OT  Pain medication PRN  Topical capsaicin      Type 2 diabetes mellitus without complication    Glucose 143  - Holding home sitagliptin and metformin  - Aspart 2U TIDWM, Detemir 4U BID, low dose SSI  - weight based dosing due to risk of hypoglycemia  - will recalculate based on new weight  - Diabetic diet 1500 cat     Hyperlipidemia    Continue home dose statin     Essential hypertension    Currently 129/72  - continue home meds: Lisinopril 40mg, Coreg 25 mg  - Norvasc 10 mg        VTE Risk Mitigation (From admission, onward)        Ordered     enoxaparin injection 40 mg  Daily      12/14/18 1741     Place SOHEILA hose  Until discontinued      12/14/18 1741     IP VTE HIGH RISK PATIENT  Once      12/14/18 1741              Unique Quispe, " YENNY  Department of Hospital Medicine   [facility]

## 2018-12-17 NOTE — ASSESSMENT & PLAN NOTE
H/H 10.3/33.5, baseline  - 11/23: B12 175, Ferritin 90, Transferrin 185, Sat Fe: 13, Fe: 35, TIBC: 274  - B12 supplement started  - Folate level WNL

## 2018-12-17 NOTE — ASSESSMENT & PLAN NOTE
Presented 12/14 with worsening RUE weakness, pain and hand swelling over the past few weeks. Says RUE issues began ~5 months ago, but prior to this she was having LE weakness and has been largely bedbound. Now right arm is contracted with wrist/hand pain and right hand swelling. She was seen by hand clinic and referred to Neurology for EMG/NCS (scheduled for 2/8/18). Since admit, workup has included unremarkable RUE U/S and MRI brain WO contrast. Exam remarkable for diminished muscle bulk, increased tone, significant muscle weakness with contracture of right arm and hand, brisk DTRs with fields sign and upgoing toe on right with fasciculations.    --MRI C spine W WO contrast pending to r/o structural etiology  --Will coordinate EMG/NCS test possibly for 12/18/18 pending results of MRI

## 2018-12-17 NOTE — ASSESSMENT & PLAN NOTE
"Gait disturbance  Localized edema of right hand    Pt reports that she has become progressively immobile and weak over past few months starting five months ago, worsening over past two months. Patient with flexion contracture of right hand, along with R hand edema and weakness as well as "tingling" pain in right elbow over the past few months that has recently worsened. Has seen orthopedics as outpatient with unremarkable radiology.Does not recall and inciting event or trauma however she has arthritis of knee.Currently she relies on family for ADL which is requesting placement for pt.  - Pt was supposed to have outpt EMG done by neurology  - PT/OT- OT recommending SNF  - MRI brain ana maria howed "No evidence of acute intracranial pathology"  - US showed "No thrombus in central veins of the right upper extremity."  - 2/5 bilateral plantar flexion  - 1/5 bilateral dorsiflexion  - neurology consulted, appreciate recs  - MRI c-spine with/ w/o contrast ordered  - Pain medication PRN and topical capsaicin  "

## 2018-12-17 NOTE — ASSESSMENT & PLAN NOTE
Edema in right hand  U/S negative for DVT and xray right hand with mild degenerative changes at some of the interphalangeal joints and mild soft tissue swelling at the wrist.    --PT/OT,   Elevate hand when able to minimize dependent edema

## 2018-12-17 NOTE — PT/OT/SLP EVAL
"Occupational Therapy   Evaluation    Name: Deisi Cheung  MRN: [id number]  Admitting Diagnosis:  Immobility     Recommendations:     Discharge Recommendations: nursing facility, skilled  Discharge Equipment Recommendations:  none  Barriers to discharge:  Decreased caregiver support    History:     Occupational Profile:  Living Environment: Pt lives alone in [facility] with threshold to enter. Tub/shower combo with grab bars and seat.   Previous level of function: family comes to assist her during the day with transfers and ADLs/selfcare/showers. She reported ~1 year a slow decline in mobility. Requires family assist to wheelchair and chairs in home. Requires assist for bathing while seated on shower chair. She wears brief/completes bedside commode with family assist. She is home alone at nights. Does not drive-family drives her to appointments. Reported R UE with decrease use also over past 1 year.  Roles and Routines: mother, grandmother, great grandmother, home dweller  Equipment Used at Home:  wheelchair, walker, rolling, bedside commode, shower chair, rollator  Assistance upon Discharge: limited 24 hour care    Past Medical History:   Diagnosis Date    Arthritis of right knee     Diabetes mellitus     Hyperlipidemia     Hypertension        Past Surgical History:   Procedure Laterality Date    none         Subjective   Pt reported "I think the diabetes got to my arm"  Chief Complaint: pain and R UE  Patient/Family Comments/goals: "To not fall"    Pain/Comfort:  · Pain Rating 1: (c/o in B LE)  · Pain Addressed 1: Reposition, Cessation of Activity, Distraction  · Pain Rating Post-Intervention 1: 0/10    Patients cultural, spiritual, Sikh conflicts given the current situation: no    Objective:     Communicated with: RN prior to session.  Patient found seated up in chair with peripheral IV upon OT entry to room.    General Precautions: Standard, fall, diabetic   Orthopedic Precautions:N/A   Braces: N/A "     Occupational Performance:    Functional Mobility/Transfers:  · Patient completed Sit <> Stand Transfer with total assistance and of 2 persons  with  no assistive device   · From bedside chair   · Functional Mobility: unable to complete 2/2 B LE weakness     Activities of Daily Living:  · Upper Body Dressing: total assistance in seated in chair  · Lower Body Dressing: dependence B LE/socks    Cognitive/Visual Perceptual:  Cognitive/Psychosocial Skills:     -       Oriented to: Person, Place, Time and Situation   -       Follows Commands/attention:Follows multistep  commands  -       Communication: clear/fluent  -       Memory: No Deficits noted  -       Safety awareness/insight to disability: intact   -       Mood/Affect/Coping skills/emotional control: Cooperative  Visual/Perceptual:      -Impaired  acuity      Physical Exam:  Balance:    -       seated unsupported with max(A) 2/2 R lateral lean; dependent in standing  Postural examination/scapula alignment:    -       Rounded shoulders  -       Forward head  -       Affected scapula abducted  -       Posterior pelvic tilt  Skin integrity: Visible skin intact  Edema:  Mild R digits  Sensation:    -       Intact  B UE  Motor Planning:    -       Impaired R UE; B LE  Dominant hand:    -       R  Upper Extremity Range of Motion:     -       Right Upper Extremity: Deficits: flexor tone/spasticity present  -       Left Upper Extremity: WFL  Upper Extremity Strength:    -       Right Upper Extremity: 1/5  -       Left Upper Extremity: 4/5   Strength:    -       Right Upper Extremity: 1/5  -       Left Upper Extremity: 4/5  Fine Motor Coordination:    -       Impaired  R  Gross motor coordination:   R UE hemiplegia/paresis with spastic tone present    AMPAC 6 Click ADL:  AMPAC Total Score: 11     Body mass index is 25.93 kg/m².    Vitals:    12/17/18 1141   BP: 129/72   Pulse: 72   Resp: 20   Temp: 98.6 °F (37 °C)       Treatment & Education:  -Pt alert and  "agreeable to therapy eval; edu on OT role in care and POC for acute setting  -Unsupported sitting- max(A)- Pt demo R lateral and posterior lean  -Static standing- total(A)X2/dependent   -Completed ROM with gentle end range stretch for R UE as tolerated; positioning aid for hand for open grasp- edu on positioning and need for prolonged stretch   -Communication board updated; questions/concerns addressed within OT scope of practice  -no family at bedside for education   Education:    Patient left up in chair with all lines intact, call button in reach, RN notified and B LE elevated in chair    Assessment:     Deisi Cheung is a 83 y.o. female with a medical diagnosis of Immobility. She demo R UE spasticity with contracture present. Moreover, she demo limitation with B LE (knee extension and flex limited) making mobility an obstacle at this time. She demo overall decline in her functional indep and safety (lives alone) at this time. She would best benefit from longterm SNF placement at this time. She would benefit from OT services to address ADLs/mobility and R UE deficits at this time. She presents with the following performance deficits affecting function: weakness, impaired endurance, impaired self care skills, impaired functional mobilty, gait instability, impaired balance, decreased upper extremity function, decreased lower extremity function, decreased coordination, impaired cognition, decreased safety awareness, pain, abnormal tone, decreased ROM, impaired fine motor, impaired coordination, impaired skin.      Rehab Prognosis: Fair; patient would benefit from acute skilled OT services to address these deficits and reach maximum level of function.         Clinical Decision Makin.  OT Mod:  "Pt evaluation falls under moderate complexity for evaluation coding due to identification of 3-5 performance deficits noted as stated above. Eval required Min/Mod assistance to complete on this date and detailed " "assessment(s) were utilized. Moreover, an expanded review of history and occupational profile obtained with additional review of cognitive, physical and psychosocial hx."     Plan:     Patient to be seen 3 x/week to address the above listed problems via self-care/home management, therapeutic activities, therapeutic exercises, neuromuscular re-education  · Plan of Care Expires: 01/13/19  · Plan of Care Reviewed with: patient    This Plan of care has been discussed with the patient who was involved in its development and understands and is in agreement with the identified goals and treatment plan    GOALS:   Multidisciplinary Problems     Occupational Therapy Goals        Problem: Occupational Therapy Goal    Goal Priority Disciplines Outcome Interventions   Occupational Therapy Goal     OT, PT/OT Ongoing (interventions implemented as appropriate)    Description:  Goals to be met by: 12/27 (10 days from initial eval)     Patient will increase functional independence with ADLs by performing:    UE Dressing with Set-up Assistance and Minimal Assistance.  Grooming while seated with Minimal Assistance.  Supine to sit with Moderate Assistance.  Squat pivot transfers to/from wheelchair with Moderate Assistance.  Pt verbalized understanding for wear schedule of R UE splint.   Pt/CG demo understanding for postioning and ROM exercises for R UE.                      Time Tracking:     OT Date of Treatment: 12/17/18  OT Start Time: 1120  OT Stop Time: 1145  OT Total Time (min): 25 min    Billable Minutes:Evaluation 25    CRISTIAN Kellogg  12/17/2018    "

## 2018-12-17 NOTE — ASSESSMENT & PLAN NOTE
Nonambulatory for >5 months, wheelchair bound at home  Exam remarkable for 3/5 LE strength, muscle atrophy, brisk upper extremity DTRs and fasciculations     --MRI C spine pending  Pending this result will try to expedite EMG/NCS   --PT/OT

## 2018-12-17 NOTE — ASSESSMENT & PLAN NOTE
Unclear exact etiology  - PT and OT  - Pain medication PRN and topical capsaicin   - neurology consulted, recs appreciated  - MRI C-spine

## 2018-12-17 NOTE — PLAN OF CARE
Santos Mena MD  [address] [facility] [place] [address]     Future Appointments   Date Time Provider Department Center   1/29/2019  9:00 AM Ivan Bajwa MD [facility] PB EPI [facility]   2/8/2019  1:30 PM [facility] EMG PROCEDURAL LAB [facility] NEURO [facility]   2/26/2019 11:45 AM Virginia Aquino MD [facility] HAND [facility]       [id number] [place], [place] - [address]  [address] [facility] [address]  Phone: [phone] Fax: [phone]    [id number] - [place], [place] - [address]  [address]  [facility] [address]  Phone: [phone] Fax: [phone]      Payor: [employer] MEDICARE / Plan: [employer] / Product Type: Medicare Advantage /        12/17/18 1536   Discharge Assessment   Assessment Type Discharge Planning Assessment   Confirmed/corrected address and phone number on facesheet? Yes   Assessment information obtained from? Patient   Prior to hospitilization cognitive status: Unable to Assess   Prior to hospitalization functional status: Needs Assistance;Assistive Equipment   Current cognitive status: Alert/Oriented   Current Functional Status: Assistive Equipment;Needs Assistance   Lives With alone   Is patient able to care for self after discharge? Unable to determine at this time (comments)   Who are your caregiver(s) and their phone number(s)? (Erum Cheung  daughter [phone])   Patient currently receives any other outside agency services? (patient reports that she has home health but cannot recall agency at this time  )   Equipment Currently Used at Home wheelchair;walker, rolling;bedside commode;shower chair   Do you have any problems affording any of your prescribed medications? TBD   Does the patient have transportation home? Yes   Transportation Anticipated family or friend will provide   Discharge Plan A Skilled Nursing Facility   Discharge Plan B Home with family;Home Health

## 2018-12-17 NOTE — ASSESSMENT & PLAN NOTE
Glucose 143  - Holding home sitagliptin and metformin  - Aspart 2U TIDWM, Detemir 4U BID, low dose SSI  - weight based dosing due to risk of hypoglycemia  - will recalculate based on new weight  - Diabetic diet 1500 cat

## 2018-12-17 NOTE — SUBJECTIVE & OBJECTIVE
"Interval History: Patient with 8/10 pain in right arm/hand. Hand is painful to touch, elbow is not painful, but "tingly". States pain medication makes her sleep. Neurology consulted. MRI C-spine with without contrast ordered. Asked nursing to re-weigh patient for insulin dosing.    Review of Systems   Constitutional: Negative for diaphoresis and fatigue.   HENT: Negative for congestion and rhinorrhea.    Respiratory: Negative for cough, chest tightness and shortness of breath.    Cardiovascular: Negative for chest pain, palpitations and leg swelling.   Gastrointestinal: Negative for abdominal pain, constipation, diarrhea, nausea and vomiting.   Genitourinary: Negative for dysuria, frequency and hematuria.   Musculoskeletal: Positive for arthralgias. Negative for back pain and myalgias.   Skin: Negative for color change, pallor and rash.   Neurological: Positive for weakness. Negative for numbness and headaches.   Psychiatric/Behavioral: Negative for agitation, behavioral problems and confusion.     Objective:     Vital Signs (Most Recent):  Temp: 98.6 °F (37 °C) (12/17/18 1141)  Pulse: 72 (12/17/18 1141)  Resp: 20 (12/17/18 1141)  BP: 129/72 (12/17/18 1141)  SpO2: 95 % (12/17/18 1141) Vital Signs (24h Range):  Temp:  [98 °F (36.7 °C)-98.8 °F (37.1 °C)] 98.6 °F (37 °C)  Pulse:  [69-85] 72  Resp:  [16-20] 20  SpO2:  [95 %-98 %] 95 %  BP: (122-157)/(62-79) 129/72     Weight: 64.3 kg (141 lb 12.1 oz)  Body mass index is 25.93 kg/m².    Intake/Output Summary (Last 24 hours) at 12/17/2018 1259  Last data filed at 12/17/2018 0200  Gross per 24 hour   Intake 160 ml   Output --   Net 160 ml      Physical Exam   Constitutional: She is oriented to person, place, and time. She appears well-developed. No distress.   Frail elderly lady   HENT:   Head: Normocephalic and atraumatic.   Eyes: EOM are normal. Pupils are equal, round, and reactive to light.   Neck: Normal range of motion. Neck supple.   Cardiovascular: Normal rate and " regular rhythm.   Pulmonary/Chest: Effort normal and breath sounds normal. She has no wheezes. She exhibits no tenderness.   Abdominal: Soft. Bowel sounds are normal. There is no tenderness. There is no guarding.   Musculoskeletal: She exhibits edema and tenderness.   R hand is TTP, swollen with contracture of R fingers and elbow, very limited active movement of R arm or hand, some passive ROM but limited by pain.  L elbow not tender   Neurological: She is alert and oriented to person, place, and time. No cranial nerve deficit or sensory deficit.   2/5 strength bilateral plantar flexion  1/5 strength bilateral dorsiflexion   Skin: Skin is warm and dry. She is not diaphoretic.   Psychiatric: She has a normal mood and affect. Her behavior is normal.   Nursing note and vitals reviewed.      Significant Labs:   CBC:   Recent Labs   Lab 12/16/18  0555 12/17/18  0450   WBC 8.09 7.74   HGB 9.8* 10.3*   HCT 31.2* 33.5*   * 367*     CMP:   Recent Labs   Lab 12/16/18  0555 12/17/18  0450    136   K 3.8 3.8    102   CO2 26 27   * 143*   BUN 14 16   CREATININE 0.6 0.6   CALCIUM 9.2 8.9   ANIONGAP 10 7*   EGFRNONAA >60.0 >60.0     POCT Glucose:   Recent Labs   Lab 12/16/18  2104 12/17/18  0809 12/17/18  1244   POCTGLUCOSE 250* 135* 252*       Significant Imaging: I have reviewed all pertinent imaging results/findings within the past 24 hours.

## 2018-12-17 NOTE — PLAN OF CARE
Problem: Adult Inpatient Plan of Care  Goal: Plan of Care Review  Outcome: Ongoing (interventions implemented as appropriate)  PT with no falls this shift. Position rotated q2h and PRN. Medication given per MAR for Right arm pain. Pt resting well, call light in reach, bed locked and in lowest position. Will continue to monitor.

## 2018-12-17 NOTE — CONSULTS
[facility]  Neurology  Consult Note    Patient Name: Deisi Cheung  MRN: [id number]  Admission Date: 12/14/2018  Hospital Length of Stay: 0 days  Code Status: Full Code   Attending Provider: GIOVANA Burrows MD   Consulting Provider: Gabriela Lopez PA-C  Primary Care Physician: Santos Mena MD  Principal Problem:Immobility    Inpatient consult to Neurology  Consult performed by: Gabriela Lopez PA-C  Consult ordered by: Unique Quispe PA-C         Subjective:     Chief Complaint:  RUE swelling, pain, weakness      HPI:   83 y.o. Female with HTN, HLD, DMII and arthritis presented to ED 12/14/18 with RUE swelling and pain worse over the past few weeks. Patient reports RUE weakness in all muscles and decreased mobility for the past 5 months. Her right hand has been swollen for at least one month with significant wrist and hand pain. Says pain feels muscular rather than involving joints and aching in quality. Rates pain 8/10 and sometimes has tingling. She also reported decreased ambulation that began before RUE issues and has been nonambulatory for the past few months. She tried home OT, which helped mobility somewhat. Was seen in hand clinic 11/30/18 for RUE issues and found to have flexion contractures of fingers. Xray R shoulder and hand were performing showing osseous demineralization, degenerative changes at interphalangeal joints, soft tissue swelling at wrist. Home health OT, finger extension splint, Neurology referral and outpatient EMG recommended. She is scheduled to see Dr. Ivan Bajwa 1/29/18 and has EMG scheduled 2/8/18 with hand clinic f/u with Dr. Aquino 2/26/18, but presented to ED sooner due to significant pain. Patient lives alone, but has children who check in on her regularly and assist with meals and medications.      Past Medical History:   Diagnosis Date    Arthritis of right knee     Diabetes mellitus     Hyperlipidemia     Hypertension      Past Surgical  History:   Procedure Laterality Date    none       Review of patient's allergies indicates:   Allergen Reactions    Pcn [penicillins]      No current facility-administered medications on file prior to encounter.      Current Outpatient Medications on File Prior to Encounter   Medication Sig    alendronate (FOSAMAX) 70 MG tablet Take 1 tablet (70 mg total) by mouth every 7 days.    aspirin (ECOTRIN) 81 MG EC tablet Take 81 mg by mouth once daily.    atorvastatin (LIPITOR) 40 MG tablet Take 40 mg by mouth once daily.    carvedilol (COREG) 25 MG tablet Take 1 tablet (25 mg total) by mouth 2 (two) times daily with meals.    lisinopril (PRINIVIL,ZESTRIL) 40 MG tablet Take 40 mg by mouth once daily.    magnesium oxide (MAG-OX) 400 mg (241.3 mg magnesium) tablet Take 400 mg by mouth once daily.    metformin (GLUCOPHAGE-XR) 500 MG 24 hr tablet Take 500 mg by mouth 2 (two) times daily with meals.    SITagliptin (JANUVIA) 50 MG Tab Take 100 mg by mouth once daily.     Family History     Problem Relation (Age of Onset)    Diabetes Sister    Heart failure Sister    Prostate cancer Brother    Stroke Sister        Tobacco Use    Smoking status: Never Smoker    Smokeless tobacco: Never Used   Substance and Sexual Activity    Alcohol use: No     Alcohol/week: 0.0 oz     Frequency: Never    Drug use: No    Sexual activity: No     Review of Systems   Constitutional: Negative for fever.   HENT: Negative for tinnitus, trouble swallowing and voice change.    Eyes: Negative for visual disturbance.   Respiratory: Negative for shortness of breath.    Cardiovascular: Negative for chest pain.   Gastrointestinal: Negative for abdominal pain.   Genitourinary: Negative for difficulty urinating.   Musculoskeletal: Positive for gait problem. Negative for arthralgias.   Neurological: Positive for weakness. Negative for tremors, facial asymmetry, speech difficulty and headaches.   Psychiatric/Behavioral: Negative for decreased  concentration.     Objective:     Vital Signs (Most Recent):  Temp: 98.8 °F (37.1 °C) (12/17/18 0744)  Pulse: 70 (12/17/18 0744)  Resp: 20 (12/17/18 0744)  BP: (!) 157/79 (12/17/18 0744)  SpO2: 95 % (12/17/18 0744) Vital Signs (24h Range):  Temp:  [96.1 °F (35.6 °C)-98.8 °F (37.1 °C)] 98.8 °F (37.1 °C)  Pulse:  [69-85] 70  Resp:  [16-20] 20  SpO2:  [95 %-98 %] 95 %  BP: (122-157)/(62-79) 157/79     Weight: 64.3 kg (141 lb 12.1 oz)  Body mass index is 25.93 kg/m².    Physical Exam   Constitutional: She is oriented to person, place, and time.   cachetic   HENT:   Head: Normocephalic and atraumatic.   Eyes: EOM are normal. Pupils are equal, round, and reactive to light.   Neurological: She is oriented to person, place, and time.   Reflex Scores:       Tricep reflexes are 3+ on the right side and 3+ on the left side.       Bicep reflexes are 3+ on the right side and 3+ on the left side.       Patellar reflexes are 2+ on the right side and 2+ on the left side.       Achilles reflexes are 0 on the right side and 0 on the left side.  Psychiatric: Her speech is normal.       NEUROLOGICAL EXAMINATION:     MENTAL STATUS   Oriented to person, place, and time.   Follows 2 step commands.   Speech: speech is normal   Level of consciousness: alert  Knowledge: good.   Normal comprehension.     CRANIAL NERVES     CN II   Visual fields full to confrontation.     CN III, IV, VI   Pupils are equal, round, and reactive to light.  Extraocular motions are normal.   Nystagmus: none   Ophthalmoparesis: none    CN V   Facial sensation intact.     CN VII   Facial expression full, symmetric.     CN VIII   Hearing: intact    CN XII   Fasciculations: absent    MOTOR EXAM   Muscle bulk: decreased  Overall muscle tone: increased  Right arm tone: spastic  Left arm tone: increased  Right leg tone: increased  Left leg tone: increased    Strength   Right deltoid: 0/5  Left deltoid: 4/5  Right biceps: 0/5  Left biceps: 4/5  Right triceps: 0/5  Left  triceps: 4/5  Right wrist extension: 0/5  Left wrist extension: 4/5  Right hamstring: 3/5  Left hamstring: 3/5  Right anterior tibial: 3/5  Left anterior tibial: 3/5  Right gastroc: 3/5  Left gastroc: 3/5       fasciculations in the left deltoid, left biceps, and anterior forearm.    No tongue fasciculations    Muscle atrophy left hand and right foot     REFLEXES     Reflexes   Right biceps: 3+  Left biceps: 3+  Right triceps: 3+  Left triceps: 3+  Right patellar: 2+  Left patellar: 2+  Right achilles: 0  Left achilles: 0  Right plantar: upgoing  Left plantar: equivocal  Left Baker: present  Right ankle clonus: absent  Left ankle clonus: absent    SENSORY EXAM   Vibration normal.     GAIT AND COORDINATION     Gait  Gait: (deferred)    Tremor   Resting tremor: absent    Significant Labs:   Recent Lab Results       12/17/18  0809   12/17/18  0450   12/16/18  2104   12/16/18  1732   12/16/18  1205        Immature Granulocytes   0.8           Immature Grans (Abs)   0.06  Comment:  Mild elevation in immature granulocytes is non specific and   can be seen in a variety of conditions including stress response,   acute inflammation, trauma and pregnancy. Correlation with other   laboratory and clinical findings is essential.             Anion Gap   7           Baso #   0.06           Basophil%   0.8           BUN, Bld   16           Calcium   8.9           Chloride   102           CO2   27           Creatinine   0.6           Differential Method   Automated           eGFR if    >60.0           eGFR if non    >60.0  Comment:  Calculation used to obtain the estimated glomerular filtration  rate (eGFR) is the CKD-EPI equation.              Eos #   0.2           Eosinophil%   2.5           Glucose   143           Gran # (ANC)   4.1           Gran%   52.8           Hematocrit   33.5           Hemoglobin   10.3           Lymph #   2.5           Lymph%   32.8           Magnesium   1.6           MCH    26.5           MCHC   30.7           MCV   86           Mono #   0.8           Mono%   10.3           MPV   10.3           nRBC   0           Phosphorus   3.1           Platelets   367           POCT Glucose 135   250 144 167     Potassium   3.8           RBC   3.88           RDW   15.9           Sodium   136           WBC   7.74                              Significant Imaging:     MRI brain WO contrast (12/14/18)  No evidence of acute intracranial pathology. Moderate generalized cerebral volume loss and moderate chronic microvascular ischemic changes.    XR right hand (11/23/18)  Suboptimal positioning on the frontal and oblique views with flexion of the 2nd through 5th digits making evaluation of the middle and distal phalanges difficult..  Bones appear somewhat demineralized.  Alignment is otherwise satisfactory and joint spaces appear fairly well maintained.  No definite evidence of fracture or dislocation.  Mild degenerative changes at some of the interphalangeal joints.  There may be some mild soft tissue swelling at the wrist.      Assessment and Plan:     * Immobility    Presented 12/14 with worsening RUE weakness, pain and hand swelling over the past few weeks. Says RUE issues began ~5 months ago, but prior to this she was having LE weakness and has been largely bedbound. Now right arm is contracted with wrist/hand pain and right hand swelling. She was seen by hand clinic and referred to Neurology for EMG/NCS (scheduled for 2/8/18). Since admit, workup has included unremarkable RUE U/S and MRI brain WO contrast. Exam remarkable for diminished muscle bulk, increased tone, significant muscle weakness with contracture of right arm and hand, brisk DTRs with fields sign and upgoing toe on right with fasciculations.    --MRI C spine W WO contrast pending to r/o structural etiology  --Will coordinate EMG/NCS test possibly for 12/18/18 pending results of MRI    Gait disturbance    Nonambulatory for >5 months,  wheelchair bound at home  Exam remarkable for 3/5 LE strength, muscle atrophy, brisk upper extremity DTRs and fasciculations     --MRI C spine pending  Pending this result will try to expedite EMG/NCS   --PT/OT   Localized edema    Edema in right hand  U/S negative for DVT and xray right hand with mild degenerative changes at some of the interphalangeal joints and mild soft tissue swelling at the wrist.    --PT/OT,   Elevate hand when able to minimize dependent edema     VTE Risk Mitigation (From admission, onward)        Ordered     enoxaparin injection 40 mg  Daily      12/14/18 1741     Place SOHEILA hose  Until discontinued      12/14/18 1741     IP VTE HIGH RISK PATIENT  Once      12/14/18 1741        Thank you for your consult. I will follow-up with patient. Please contact us if you have any additional questions.    Gabriela Lopez PA-C  General Neurology Consult  Neuro Consult [facility] # [id number]

## 2018-12-18 PROBLEM — R29.898 WEAKNESS OF BOTH LOWER EXTREMITIES: Status: ACTIVE | Noted: 2018-01-01

## 2018-12-18 PROBLEM — M62.421: Status: ACTIVE | Noted: 2018-01-01

## 2018-12-18 PROBLEM — D51.9 VITAMIN B12 DEFICIENCY ANEMIA: Status: ACTIVE | Noted: 2018-01-01

## 2018-12-18 NOTE — PLAN OF CARE
Problem: Occupational Therapy Goal  Goal: Occupational Therapy Goal  Goals to be met by: 12/27 (10 days from initial eval)     Patient will increase functional independence with ADLs by performing:    UE Dressing with Set-up Assistance and Minimal Assistance.  Grooming while seated with Minimal Assistance.  Supine to sit with Moderate Assistance.  Squat pivot transfers to/from wheelchair with Moderate Assistance.  Pt verbalized understanding for wear schedule of R UE splint.   Pt/CG demo understanding for postioning and ROM exercises for R UE.     Outcome: Ongoing (interventions implemented as appropriate)  Goals remain appropriate. R resting hand splint ordered/donned/education and hand out provided. CRISTIAN Kellogg 12/18/2018

## 2018-12-18 NOTE — PLAN OF CARE
Problem: Adult Inpatient Plan of Care  Goal: Plan of Care Review  Outcome: Ongoing (interventions implemented as appropriate)  Patient alert and oriented. Patient  Sitting up in chair. Patient tolerating diet well. No distress noted.

## 2018-12-18 NOTE — ASSESSMENT & PLAN NOTE
"Family reports fall at [facility] few years ago and said patient "hasnt been right since." Bilateral lower extremity weakness with immobility for close to 1 year and 5 month hx of RUE contracture limiting ADLs. Presented to ED 12/14 with worsening right hand swelling and wrist/hand pain. MRI brain and C spine WO contrast unremarkable for structural etiology of symptoms. RUE U/S negative as well. Exam remarkable for diminished bulk, increased tone throughout, dystonic posture R shoulder/neck, fasciculations in left arm, brisk DTRs in upper extremity and right up going toe. No bulbar weakness or tongue fasciculations.    At this time, etiology remains unclear. Will perform EMG/NCS on 12/19 at 10:30 to r/o MND  "

## 2018-12-18 NOTE — PLAN OF CARE
CASTILLO following for DC needs. CASTILLO in communication with CM.    CASTILLO spoke to patient's daughter in law, Kayli ([phone]), to discuss discharge planning. Kayli stated that she would like to come to the hospital later today with her  (patient's son) to discuss SNF and longterm placement.  CASTILLO provided Kayli with SW's number to call once they arrive at the hospital.     Maria Del Rosario Umanzor, WILSON  [facility]  [id number]

## 2018-12-18 NOTE — PT/OT/SLP PROGRESS
Occupational Therapy   Treatment    Name: Deisi Cheung  MRN: [id number]  Admitting Diagnosis:  Immobility       Recommendations:     Discharge Recommendations: nursing facility, skilled  Discharge Equipment Recommendations:  none  Barriers to discharge:  Decreased caregiver support    Subjective     Pain/Comfort:  · Pain Rating 1: 0/10  · Pain Addressed 1: Pre-medicate for activity  · Pain Rating Post-Intervention 1: 0/10    Objective:     Communicated with: RN ([place]) prior to session.  Patient found seated up in bedside chair with peripheral IV, telemetry upon OT entry to room.    General Precautions: Standard, fall, diabetic   Orthopedic Precautions:N/A   Braces: N/A     Treatment & Education:  -Pt alert and oriented x4; agreeable to OT session for R resting hand splint as discussed 12/17; re edu on purpose of splint  -Pre wang soft resting hand splint ordered for R UE for prolonged stretch and to prevent further contracture  -Completed PROM with gentle end range stretch to achieve neutral wrist and open grasp as tolerated  -splinted modified for pt's best fit and safety of joint integrity  -splint donned/provided hand out for education of wear/care/precautions -- pt verbalized understanding  -handout left for family education  -Discussed wear/care with nursing on this date  -Communication board updated; questions/concerns addressed within OT scope of practice  -no family at bedside for education     Patient left up in chair with all lines intact, call button in reach and RN notified  Education:    Assessment:     Deisi Cheung is a 83 y.o. female with a medical diagnosis of Immobility. She demo R UE spasticity and B LE weakness with contractures.  She presents with the following performance deficits affecting function are weakness, impaired endurance, impaired self care skills, impaired functional mobilty, gait instability, impaired balance, decreased upper extremity function, decreased lower extremity function,  decreased coordination, impaired cognition, decreased safety awareness, pain, abnormal tone, decreased ROM, impaired fine motor, impaired coordination, impaired skin. Pt demo understanding for wear/care of resting hand splint on this date. OT to cont follow up 3x/w for treatment.    Rehab Prognosis:  Fair; patient would benefit from acute skilled OT services to address these deficits and reach maximum level of function.       Plan:     Patient to be seen 3 x/week to address the above listed problems via self-care/home management, therapeutic activities, therapeutic exercises, neuromuscular re-education  · Plan of Care Expires: 01/13/19  · Plan of Care Reviewed with: patient    This Plan of care has been discussed with the patient who was involved in its development and understands and is in agreement with the identified goals and treatment plan    GOALS:   Multidisciplinary Problems     Occupational Therapy Goals        Problem: Occupational Therapy Goal    Goal Priority Disciplines Outcome Interventions   Occupational Therapy Goal     OT, PT/OT Ongoing (interventions implemented as appropriate)    Description:  Goals to be met by: 12/27 (10 days from initial eval)     Patient will increase functional independence with ADLs by performing:    UE Dressing with Set-up Assistance and Minimal Assistance.  Grooming while seated with Minimal Assistance.  Supine to sit with Moderate Assistance.  Squat pivot transfers to/from wheelchair with Moderate Assistance.  Pt verbalized understanding for wear schedule of R UE splint.   Pt/CG demo understanding for postioning and ROM exercises for R UE.                      Time Tracking:     OT Date of Treatment: 12/18/18  OT Start Time: 1127  OT Stop Time: 1145  OT Total Time (min): 18 min    Billable Minutes:Therapeutic Activity 18    CRISTIAN Kellogg  12/18/2018

## 2018-12-18 NOTE — PROGRESS NOTES
[facility]  Neurology  Progress Note    Patient Name: Deisi Cheung  MRN: [id number]  Admission Date: 12/14/2018  Hospital Length of Stay: 0 days  Code Status: Full Code   Attending Provider: Yodit Issa MD  Primary Care Physician: Santos Mena MD   Principal Problem:Immobility      Subjective:     Interval History:   MRI C spine performed showing mild multilevel degenerative changes   No focal cord abnormalities  Right hand swelling better, but pain still rated 7-8/10    Current Facility-Administered Medications   Medication Dose Route Frequency Provider Last Rate Last Dose    acetaminophen tablet 650 mg  650 mg Oral Q4H PRN Chrissy Ellington MD   650 mg at 12/16/18 0743    amLODIPine tablet 10 mg  10 mg Oral Daily DANIELLA Quan-C   10 mg at 12/18/18 0807    atorvastatin tablet 40 mg  40 mg Oral Daily Chrissy Ellington MD   40 mg at 12/18/18 0807    capsaicin 0.025 % cream   Topical (Top) QID PRN DANIELLA Quan-C        carvedilol tablet 25 mg  25 mg Oral BID WM Chrissy Ellington MD   25 mg at 12/18/18 0807    cyanocobalamin tablet 1,000 mcg  1,000 mcg Oral Daily Unique Quispe, PA-C   1,000 mcg at 12/18/18 0807    dextrose 50% injection 12.5 g  12.5 g Intravenous PRN Unique Quispe, PA-C        dextrose 50% injection 25 g  25 g Intravenous PRN Unique Quispe, PA-C        enoxaparin injection 40 mg  40 mg Subcutaneous Daily Yodit Issa MD        glucagon (human recombinant) injection 1 mg  1 mg Intramuscular PRN Uniquecordell Quispe, PA-C        glucose chewable tablet 16 g  16 g Oral PRN Unique CARRINGTON Quispe, PA-C        glucose chewable tablet 24 g  24 g Oral PRN Unique Quispe, PA-C        hydrALAZINE tablet 25 mg  25 mg Oral Q8H PRN Unique Quispe, PA-C        HYDROcodone-acetaminophen  mg per tablet 1 tablet  1 tablet Oral Q6H PRN Unique Quispe, PA-C   1 tablet at 12/18/18 1142    HYDROcodone-acetaminophen 5-325 mg per tablet  1 tablet  1 tablet Oral Q6H PRN Unique Quispe PA-C   1 tablet at 12/17/18 2242    insulin aspart U-100 pen 0-5 Units  0-5 Units Subcutaneous QID (AC + HS) PRN Unique Quispe PA-C   4 Units at 12/18/18 1424    insulin aspart U-100 pen 4 Units  4 Units Subcutaneous TIDWM Yodit Issa MD        insulin detemir U-100 pen 6 Units  6 Units Subcutaneous BID Yodit Issa MD        lisinopril tablet 40 mg  40 mg Oral Daily Chrissy Ellington MD   40 mg at 12/18/18 0807    magnesium oxide tablet 400 mg  400 mg Oral Daily Chrissy Ellington MD   400 mg at 12/18/18 0807    sodium chloride 0.9% flush 5 mL  5 mL Intravenous PRN Chrissy Ellington MD         Review of Systems   Constitutional: Negative for fever and unexpected weight change.   HENT: Negative for trouble swallowing and voice change.    Respiratory: Negative for shortness of breath.    Musculoskeletal: Positive for gait problem and neck pain.   Neurological: Positive for weakness. Negative for dizziness, tremors, facial asymmetry, light-headedness and headaches.     Objective:     Vital Signs (Most Recent):  Temp: 98.6 °F (37 °C) (12/18/18 1624)  Pulse: 60 (12/18/18 1624)  Resp: 17 (12/18/18 1624)  BP: 117/62 (12/18/18 1624)  SpO2: 96 % (12/18/18 1624) Vital Signs (24h Range):  Temp:  [97.6 °F (36.4 °C)-98.6 °F (37 °C)] 98.6 °F (37 °C)  Pulse:  [60-73] 60  Resp:  [16-20] 17  SpO2:  [96 %-98 %] 96 %  BP: (112-158)/(59-80) 117/62     Weight: 50.9 kg (112 lb 3.4 oz)  Body mass index is 20.52 kg/m².    Physical Exam   Constitutional: She is oriented to person, place, and time.   Neurological: She is oriented to person, place, and time.   Reflex Scores:       Bicep reflexes are 3+ on the right side and 3+ on the left side.       Brachioradialis reflexes are 3+ on the right side and 3+ on the left side.  Psychiatric: Her speech is normal.     NEUROLOGICAL EXAMINATION:     MENTAL STATUS   Oriented to person, place, and time.   Attention: normal.  Concentration: normal.   Speech: speech is normal   Level of consciousness: alert  Knowledge: good.   Normal comprehension.     CRANIAL NERVES     CN III, IV, VI   Right pupil: Shape: irregular (surgical pupil).   Nystagmus: none   Ophthalmoparesis: none    CN VIII   Hearing: intact    CN IX, X   Palate: symmetric    CN XII   Tongue: not atrophic  Fasciculations: absent  Tongue deviation: none    MOTOR EXAM   Muscle bulk: decreased  Overall muscle tone: increased  Right arm tone: spastic       Dystonic appearing posture right   Contracted RUE   Wearing wrist, finger extension brace     REFLEXES     Reflexes   Right brachioradialis: 3+  Left brachioradialis: 3+  Right biceps: 3+  Left biceps: 3+  Left Baker: present  Right ankle clonus: absent  Left ankle clonus: absent    SENSORY EXAM   Light touch normal.     GAIT AND COORDINATION     Gait  Gait: (deferred)    Tremor   Resting tremor: absent    Significant Labs:   Hemoglobin A1c:   Recent Labs   Lab 11/23/18  1047   HGBA1C 6.0*     Blood Culture: No results for input(s): LABBLOO in the last 48 hours.  CBC:   Recent Labs   Lab 12/17/18  0450 12/18/18  0510   WBC 7.74 7.93   HGB 10.3* 10.1*   HCT 33.5* 31.5*   * 365*     CMP:   Recent Labs   Lab 12/17/18  0450 12/18/18  0510   * 134*    136   K 3.8 3.7    101   CO2 27 27   BUN 16 19   CREATININE 0.6 0.6   CALCIUM 8.9 8.6*   MG 1.6 1.7   ANIONGAP 7* 8   EGFRNONAA >60.0 >60.0     Inflammatory Markers: No results for input(s): SEDRATE, CRP, PROCAL in the last 48 hours.  Urine Culture: No results for input(s): LABURIN in the last 48 hours.  Urine Studies: No results for input(s): COLORU, APPEARANCEUA, PHUR, SPECGRAV, PROTEINUA, GLUCUA, KETONESU, BILIRUBINUA, OCCULTUA, NITRITE, UROBILINOGEN, LEUKOCYTESUR, RBCUA, WBCUA, BACTERIA, SQUAMEPITHEL, HYALINECASTS in the last 48 hours.    Invalid input(s): WRIGHTSUR  All pertinent lab results from the past 24 hours have been reviewed.    Significant  "Imaging: I have reviewed and interpreted all pertinent imaging results/findings within the past 24 hours.     MRI Cervical spine WO contrast (12/17/18):  Cervical spine alignment appears within normal limits.  Cervical vertebral body heights are well maintained without evidence of acute fracture.  There is no evidence of diffuse bone marrow placement process. The cervicomeduallary junction is normal.  The cervical cord is normal in contour, caliber, and signal. There is a 1.5 cm cystic left thyroid lobe nodule.  The remaining visualized soft tissues of the neck are unremarkable.    C2-3:  No disc herniation, spinal canal narrowing, or neuroforaminal narrowing.  C3-4:  There is a broad-based posterior disc osteophyte complex with prominent central protrusion which effaces the anterior thecal sac.  No significant spinal canal or neural foraminal narrowing.  C4-5:  There is a broad-based posterior disc osteophyte complex and bilateral uncovertebral spurring.  There is at most mild bilateral neural foraminal narrowing.  No significant spinal canal stenosis.  C5-6:  There is a broad-based posterior disc osteophyte complex, bilateral uncovertebral spurring, and right-sided facet arthropathy.  There is mild right-sided neural foraminal narrowing.  No significant spinal canal stenosis.  C6-7:  There is a broad-based posterior disc osteophyte complex.  There is no significant spinal canal stenosis or neural foraminal narrowing.  C7-T1:  No disc herniation, spinal canal narrowing, or neuroforaminal narrowing.    Assessment and Plan:     * Immobility    Family reports fall at [employer] few years ago and said patient "hasnt been right since." Bilateral lower extremity weakness with immobility for close to 1 year and 5 month hx of RUE contracture limiting ADLs. Presented to ED 12/14 with worsening right hand swelling and wrist/hand pain. MRI brain and C spine WO contrast unremarkable for structural etiology of symptoms. RUE U/S " negative as well. Exam remarkable for diminished bulk, increased tone throughout, dystonic posture R shoulder/neck, fasciculations in left arm, brisk DTRs in upper extremity and right up going toe. No bulbar weakness or tongue fasciculations.    At this time, etiology remains unclear. Will perform EMG/NCS on 12/19 at 10:30 to r/o MND       VTE Risk Mitigation (From admission, onward)        Ordered     enoxaparin injection 40 mg  Daily      12/18/18 1631     Place SOHEILA hose  Until discontinued      12/14/18 1741     IP VTE HIGH RISK PATIENT  Once      12/14/18 1741        Gabriela Lopez PA-C  General Neurology Consult  Neuro Consult [facility] # [id number]

## 2018-12-18 NOTE — ASSESSMENT & PLAN NOTE
· Patient's blood pressure is controlled here in the hospital over past 24 hours.   · Goal for blood pressure is SBP < 140 and DBP < 90 as patient > or = 60 years of age and patient is diabetic based on JNC 8 guidelines.   · Continue current treatment regimen of Norvasc 10 mg po daily ( new med started on this admit) + Lisinopril 40 mg po daily (home med) and Coreg 25 mg po BID (home med).  · Plan is to monitor patient's blood pressure routinely while patient is hospitalized.

## 2018-12-18 NOTE — ASSESSMENT & PLAN NOTE
Suboptimal control in the hospital in past 24 hours. Holding home meds of Sitagliptin and Metformin while patient in hospital.     Blood Sugars (AccuCheck):  Recent Labs     12/17/18  0809 12/17/18  1244 12/17/18  1724 12/17/18  2109 12/18/18  0822 12/18/18  1234   POCTGLUCOSE 135* 252* 140* 326* 154* 314*       · Last HgA1C 6 % and at goal.  · Plan is to increase Levemir from 4 to 6 units twice a day and increase Novolog from 2 to 4 units with meals to treat in hospital.   · Plan is to continue to monitor POCT glucose 4 times a day with each meal and at bedtime and cover with Novolog low dose sliding scale insulin.   · Plan is to continue 1500 cat diabetic diet in the hospital.   · Target pre-meal glucose goal is <140 with all random glucoses <180 in non-critically ill patient.

## 2018-12-18 NOTE — SUBJECTIVE & OBJECTIVE
Interval History: Patient reports splint is helping with pain to right hand and arm. EMG/NCV ordered as per Neurology recs. MRI of cervical spine unremarkable.     Review of Systems   Constitutional: Negative for fatigue and fever.   Respiratory: Negative for cough and shortness of breath.    Cardiovascular: Negative for chest pain, palpitations and leg swelling.   Gastrointestinal: Negative for abdominal pain, constipation, diarrhea and nausea.   Genitourinary: Negative for dysuria, frequency and hematuria.   Musculoskeletal: Positive for arthralgias. Negative for back pain and myalgias.   Skin: Negative for rash.   Neurological: Positive for weakness (RUE and both legs ). Negative for numbness and headaches.   Psychiatric/Behavioral: Negative for agitation and confusion.     Objective:     Vital Signs (Most Recent):  Temp: 98.4 °F (36.9 °C) (12/18/18 1128)  Pulse: 70 (12/18/18 1128)  Resp: 18 (12/18/18 1128)  BP: (!) 112/59 (12/18/18 1128)  SpO2: 98 % (12/18/18 1128) Vital Signs (24h Range):  Temp:  [97.6 °F (36.4 °C)-98.5 °F (36.9 °C)] 98.4 °F (36.9 °C)  Pulse:  [62-73] 70  Resp:  [16-20] 18  SpO2:  [96 %-98 %] 98 %  BP: (112-158)/(59-80) 112/59     Weight: 50.9 kg (112 lb 3.4 oz)  Body mass index is 20.52 kg/m².  No intake or output data in the 24 hours ending 12/18/18 1546   Physical Exam   Constitutional: She is oriented to person, place, and time. She appears well-developed. No distress.   Frail elderly lady   HENT:   Head: Normocephalic and atraumatic.   Mouth/Throat: No oropharyngeal exudate.   Eyes: No scleral icterus.   Neck: Neck supple.   Cardiovascular: Normal rate, regular rhythm and normal heart sounds. Exam reveals no gallop and no friction rub.   No murmur heard.  Pulmonary/Chest: Effort normal and breath sounds normal. No respiratory distress. She has no wheezes.   Abdominal: Soft. Bowel sounds are normal. She exhibits no distension. There is no tenderness. There is no guarding.   Musculoskeletal:  She exhibits edema and tenderness.   Right hand is TTP, swollen with contracture of R fingers and elbow, very limited active movement of R arm or hand, some passive ROM but limited by pain. Splint in place to right arm.      Neurological: She is alert and oriented to person, place, and time.   2/5 strength bilateral plantar flexion  1/5 strength bilateral dorsiflexion   Skin: Skin is warm and dry. She is not diaphoretic.   Psychiatric: She has a normal mood and affect. Her behavior is normal. Judgment and thought content normal.   Nursing note and vitals reviewed.      Significant Labs:   CBC:   Recent Labs   Lab 12/17/18  0450 12/18/18  0510   WBC 7.74 7.93   HGB 10.3* 10.1*   HCT 33.5* 31.5*   * 365*     CMP:   Recent Labs   Lab 12/17/18  0450 12/18/18  0510    136   K 3.8 3.7    101   CO2 27 27   * 134*   BUN 16 19   CREATININE 0.6 0.6   CALCIUM 8.9 8.6*   ANIONGAP 7* 8   EGFRNONAA >60.0 >60.0     Blood Sugars (AccuCheck):  Recent Labs     12/17/18  0809 12/17/18  1244 12/17/18  1724 12/17/18  2109 12/18/18  0822 12/18/18  1234   POCTGLUCOSE 135* 252* 140* 326* 154* 314*        Significant Imaging: I have reviewed all pertinent imaging results/findings within the past 24 hours.

## 2018-12-18 NOTE — PLAN OF CARE
Problem: Adult Inpatient Plan of Care  Goal: Plan of Care Review  Outcome: Ongoing (interventions implemented as appropriate)  Treated patient for pain of the right shoulder this shift.  Turned patient frequently to help prevent pressure ulcers.  VS stable this shift.  No further complaints this shift.

## 2018-12-18 NOTE — PLAN OF CARE
Problem: Adult Inpatient Plan of Care  Goal: Plan of Care Review  Outcome: Ongoing (interventions implemented as appropriate)  Plan of care reviewed. Verbalized understanding. No acute events this shift. NAD. Bed low and locked. Call light in reach. Will monitor.

## 2018-12-18 NOTE — ASSESSMENT & PLAN NOTE
"Gait disturbance  Localized edema of right hand  Contracture of muscle, right upper arm  Weakness of both lower extremities    - Condition unchanged since admit.   - Patient with progressive immobility and weak over past few months starting five months ago, worsening over past two months. Patient with flexion contracture of right hand, along with right hand edema and weakness as well as "tingling" pain in right elbow over the past few months that has recently worsened. Has seen orthopedics as outpatient with unremarkable Radiology.  - EMG/NCV ordered on 12/18 to further evaluate cause of RUE contractures and leg weakness as per Neurology recs. Cause of contractures and leg weakness unclear and concern for possible motor neuron disease by Neurology.   - PT/OT consulted and evaluated patient and following - OT recommending SNF and  working with family and patient on choices.   - MRI brain done showed "No evidence of acute intracranial pathology"  - US of RUE showed "No thrombus in central veins of the right upper extremity."  - MRI of cervical spine "Mild DJD of C-spine but no stenosis"  - CPK normal at 61  - Neurology following and appreciate recs.   - Pain medication PRN and topical capsaicin to right upper extremity. Pain related to contracture sand improved with placement of right arm splint.   - Edema to right hand and arm related to immobility and improved with splint.   "

## 2018-12-18 NOTE — PLAN OF CARE
CASTILLO met with the patient, son and daughter in law (Kayli) at the bedside. The family had questions regarding SNF placement. CASTILLO provided the family with a SNF list and explained SNF to the family. The family is currently reviewing the list. CASTILLO asked the family to provide 3 SNF choices so that referral can be sent. The family will call CASTILLO with SNF choices.      12/18/18 1527   Post-Acute Status   Post-Acute Authorization Placement   Post-Acute Placement Status Patient List Provided     Maria Del Rosario Umanzor LMSW  [facility]  [id number]

## 2018-12-18 NOTE — SUBJECTIVE & OBJECTIVE
Subjective:     Interval History:   MRI C spine performed showing mild multilevel degenerative changes   No focal cord abnormalities  Right hand swelling better, but pain still rated 7-8/10    Current Facility-Administered Medications   Medication Dose Route Frequency Provider Last Rate Last Dose    acetaminophen tablet 650 mg  650 mg Oral Q4H PRN Chrissy Ellington MD   650 mg at 12/16/18 0743    amLODIPine tablet 10 mg  10 mg Oral Daily Unique Quispe, PA-C   10 mg at 12/18/18 0807    atorvastatin tablet 40 mg  40 mg Oral Daily Chrissy Ellington MD   40 mg at 12/18/18 0807    capsaicin 0.025 % cream   Topical (Top) QID PRN Unique Quispe, PA-C        carvedilol tablet 25 mg  25 mg Oral BID WM Chrissy Ellington MD   25 mg at 12/18/18 0807    cyanocobalamin tablet 1,000 mcg  1,000 mcg Oral Daily Unique E. Jose Enrique, PA-C   1,000 mcg at 12/18/18 0807    dextrose 50% injection 12.5 g  12.5 g Intravenous PRN Unique E. Jose Enrique, PA-C        dextrose 50% injection 25 g  25 g Intravenous PRN Unique E. Jose Enrique, PA-C        enoxaparin injection 40 mg  40 mg Subcutaneous Daily Yodit Issa MD        glucagon (human recombinant) injection 1 mg  1 mg Intramuscular PRN Unique EFely Quispe, PA-C        glucose chewable tablet 16 g  16 g Oral PRN Unique E. Morphy, PA-C        glucose chewable tablet 24 g  24 g Oral PRN Unique E. Milagrosy, PA-C        hydrALAZINE tablet 25 mg  25 mg Oral Q8H PRN Unique E. Morphy, PA-C        HYDROcodone-acetaminophen  mg per tablet 1 tablet  1 tablet Oral Q6H PRN Unique E. Morphy, PA-C   1 tablet at 12/18/18 1142    HYDROcodone-acetaminophen 5-325 mg per tablet 1 tablet  1 tablet Oral Q6H PRN Unique E. Morphy, PA-C   1 tablet at 12/17/18 2242    insulin aspart U-100 pen 0-5 Units  0-5 Units Subcutaneous QID (AC + HS) PRN Unique YURIDIA. Milagrosy, PA-C   4 Units at 12/18/18 1424    insulin aspart U-100 pen 4 Units  4 Units Subcutaneous TIDWM Yodit Issa MD         insulin detemir U-100 pen 6 Units  6 Units Subcutaneous BID Yodit Issa MD        lisinopril tablet 40 mg  40 mg Oral Daily Chrissy Ellington MD   40 mg at 12/18/18 0807    magnesium oxide tablet 400 mg  400 mg Oral Daily Chrissy Ellington MD   400 mg at 12/18/18 0807    sodium chloride 0.9% flush 5 mL  5 mL Intravenous PRN Chrissy Ellington MD         Review of Systems   Constitutional: Negative for fever and unexpected weight change.   HENT: Negative for trouble swallowing and voice change.    Respiratory: Negative for shortness of breath.    Musculoskeletal: Positive for gait problem and neck pain.   Neurological: Positive for weakness. Negative for dizziness, tremors, facial asymmetry, light-headedness and headaches.     Objective:     Vital Signs (Most Recent):  Temp: 98.6 °F (37 °C) (12/18/18 1624)  Pulse: 60 (12/18/18 1624)  Resp: 17 (12/18/18 1624)  BP: 117/62 (12/18/18 1624)  SpO2: 96 % (12/18/18 1624) Vital Signs (24h Range):  Temp:  [97.6 °F (36.4 °C)-98.6 °F (37 °C)] 98.6 °F (37 °C)  Pulse:  [60-73] 60  Resp:  [16-20] 17  SpO2:  [96 %-98 %] 96 %  BP: (112-158)/(59-80) 117/62     Weight: 50.9 kg (112 lb 3.4 oz)  Body mass index is 20.52 kg/m².    Physical Exam   Constitutional: She is oriented to person, place, and time.   Neurological: She is oriented to person, place, and time.   Reflex Scores:       Bicep reflexes are 3+ on the right side and 3+ on the left side.       Brachioradialis reflexes are 3+ on the right side and 3+ on the left side.  Psychiatric: Her speech is normal.     NEUROLOGICAL EXAMINATION:     MENTAL STATUS   Oriented to person, place, and time.   Attention: normal. Concentration: normal.   Speech: speech is normal   Level of consciousness: alert  Knowledge: good.   Normal comprehension.     CRANIAL NERVES     CN III, IV, VI   Right pupil: Shape: irregular (surgical pupil).   Nystagmus: none   Ophthalmoparesis: none    CN VIII   Hearing: intact    CN IX, X   Palate:  symmetric    CN XII   Tongue: not atrophic  Fasciculations: absent  Tongue deviation: none    MOTOR EXAM   Muscle bulk: decreased  Overall muscle tone: increased  Right arm tone: spastic       Dystonic appearing posture right   Contracted RUE   Wearing wrist, finger extension brace     REFLEXES     Reflexes   Right brachioradialis: 3+  Left brachioradialis: 3+  Right biceps: 3+  Left biceps: 3+  Left Baker: present  Right ankle clonus: absent  Left ankle clonus: absent    SENSORY EXAM   Light touch normal.     GAIT AND COORDINATION     Gait  Gait: (deferred)    Tremor   Resting tremor: absent    Significant Labs:   Hemoglobin A1c:   Recent Labs   Lab 11/23/18  1047   HGBA1C 6.0*     Blood Culture: No results for input(s): LABBLOO in the last 48 hours.  CBC:   Recent Labs   Lab 12/17/18  0450 12/18/18  0510   WBC 7.74 7.93   HGB 10.3* 10.1*   HCT 33.5* 31.5*   * 365*     CMP:   Recent Labs   Lab 12/17/18  0450 12/18/18  0510   * 134*    136   K 3.8 3.7    101   CO2 27 27   BUN 16 19   CREATININE 0.6 0.6   CALCIUM 8.9 8.6*   MG 1.6 1.7   ANIONGAP 7* 8   EGFRNONAA >60.0 >60.0     Inflammatory Markers: No results for input(s): SEDRATE, CRP, PROCAL in the last 48 hours.  Urine Culture: No results for input(s): LABURIN in the last 48 hours.  Urine Studies: No results for input(s): COLORU, APPEARANCEUA, PHUR, SPECGRAV, PROTEINUA, GLUCUA, KETONESU, BILIRUBINUA, OCCULTUA, NITRITE, UROBILINOGEN, LEUKOCYTESUR, RBCUA, WBCUA, BACTERIA, SQUAMEPITHEL, HYALINECASTS in the last 48 hours.    Invalid input(s): WRIGHTSUR  All pertinent lab results from the past 24 hours have been reviewed.    Significant Imaging: I have reviewed and interpreted all pertinent imaging results/findings within the past 24 hours.     MRI Cervical spine WO contrast (12/17/18):  Cervical spine alignment appears within normal limits.  Cervical vertebral body heights are well maintained without evidence of acute fracture.  There is  no evidence of diffuse bone marrow placement process. The cervicomeduallary junction is normal.  The cervical cord is normal in contour, caliber, and signal. There is a 1.5 cm cystic left thyroid lobe nodule.  The remaining visualized soft tissues of the neck are unremarkable.    C2-3:  No disc herniation, spinal canal narrowing, or neuroforaminal narrowing.  C3-4:  There is a broad-based posterior disc osteophyte complex with prominent central protrusion which effaces the anterior thecal sac.  No significant spinal canal or neural foraminal narrowing.  C4-5:  There is a broad-based posterior disc osteophyte complex and bilateral uncovertebral spurring.  There is at most mild bilateral neural foraminal narrowing.  No significant spinal canal stenosis.  C5-6:  There is a broad-based posterior disc osteophyte complex, bilateral uncovertebral spurring, and right-sided facet arthropathy.  There is mild right-sided neural foraminal narrowing.  No significant spinal canal stenosis.  C6-7:  There is a broad-based posterior disc osteophyte complex.  There is no significant spinal canal stenosis or neural foraminal narrowing.  C7-T1:  No disc herniation, spinal canal narrowing, or neuroforaminal narrowing.

## 2018-12-18 NOTE — ASSESSMENT & PLAN NOTE
- Controlled and stable. H/H 10.3/33.5 at baseline.   - 11/23: B12 175, Ferritin 90, Transferrin 185, Sat Fe: 13, Fe: 35, TIBC: 274  - B12 supplement started at Vitamin B12 1000 mcg po daily and will recheck Vitamin B12 and methylmalonic acid level in am to see if improvement.   - Folate level normal.

## 2018-12-18 NOTE — PROGRESS NOTES
"[facility] Hospital Medicine  Progress Note    Patient Name: Deisi Cheung  MRN: [id number]  Patient Class: OP- Observation   Admission Date: 12/14/2018  Length of Stay: 0 days  Attending Physician: Yodit Issa MD  Primary Care Provider: Santos Mena MD    [facility] Medicine Team: [facility] [place] Yodit Issa MD    Subjective:     Principal Problem:Immobility    HPI:  82 YO lady with HTN, HLD, Type 2 diabetes on oral hypoglycemics and osteoarthritis who presented with complaint of presistant right arm weakness and right hand swelling for past 5 months. She denies injury or trauma. She has weakness of the entire RUE. She has difficulty with active shoulder, elbow, wrist, and finger motion. She has flexion contracture of the fingers. She notes edema of the hand which is improved with elevation. She is able to passively extend them to some degree however limited by pain.  She denies notable numbness or tingling. Confusion, trauma, swelling in any other extremities any rashes, fever or chills, neck pain,  hx of known CVA. She was evaluated by Orthopedics in past with  prior x-rays which are unremarkable. So she was sent to Neurology for further evaluation and recommended an EMG which has not been done yet. On presentation to ED, she had an MRI brain done that showed " No evidence of acute intracranial pathology.Moderate generalized cerebral volume loss and moderate chronic microvascular ischemic changes" and US of RUE that showed "No thrombus in central veins of the right upper extremity."   Of note pt has been bed bound and not ambulatory for months (no clear inciting events) she has worked with PT and OT in past for her knee pain but has progressively has become bed bound and relies on her family for her ADLs. Family reports that they can no longer meet all her need and are requesting admission for placement to nursing home. We will admit for safe dispo planning, CPK has been " ordered.    Hospital Course:  Mrs. Cheung was admitted for nursing home placement and Neurology evaluation as to cause of progressive immobility and loss o use of right arm and leg weakness. PT consulted. H/H at baseline with anemia. B12 low, started on supplement. Patient hypertensive in absence of pain and started on Norvasc 10 mg po daily to treat. Neurology consulted, MRI of C-spine ordered and returned with only mild DJD of cervical spine but no spinal stenosis and does not explain patient's neurologic symptoms. EMG and NCV ordered on 12/18 to further work-up cause of progressive weakness. PT/OT consulted, recommended SNF placement and  working with family on placement choices. Pain to right arm imp[roved with placement of splint to right hand and forearm to help with contractures. CPK normal at 61.     Interval History: Patient reports splint is helping with pain to right hand and arm. EMG/NCV ordered as per Neurology recs. MRI of cervical spine unremarkable.     Review of Systems   Constitutional: Negative for fatigue and fever.   Respiratory: Negative for cough and shortness of breath.    Cardiovascular: Negative for chest pain, palpitations and leg swelling.   Gastrointestinal: Negative for abdominal pain, constipation, diarrhea and nausea.   Genitourinary: Negative for dysuria, frequency and hematuria.   Musculoskeletal: Positive for arthralgias. Negative for back pain and myalgias.   Skin: Negative for rash.   Neurological: Positive for weakness (RUE and both legs ). Negative for numbness and headaches.   Psychiatric/Behavioral: Negative for agitation and confusion.     Objective:     Vital Signs (Most Recent):  Temp: 98.4 °F (36.9 °C) (12/18/18 1128)  Pulse: 70 (12/18/18 1128)  Resp: 18 (12/18/18 1128)  BP: (!) 112/59 (12/18/18 1128)  SpO2: 98 % (12/18/18 1128) Vital Signs (24h Range):  Temp:  [97.6 °F (36.4 °C)-98.5 °F (36.9 °C)] 98.4 °F (36.9 °C)  Pulse:  [62-73] 70  Resp:  [16-20]  18  SpO2:  [96 %-98 %] 98 %  BP: (112-158)/(59-80) 112/59     Weight: 50.9 kg (112 lb 3.4 oz)  Body mass index is 20.52 kg/m².  No intake or output data in the 24 hours ending 12/18/18 1546   Physical Exam   Constitutional: She is oriented to person, place, and time. She appears well-developed. No distress.   Frail elderly lady   HENT:   Head: Normocephalic and atraumatic.   Mouth/Throat: No oropharyngeal exudate.   Eyes: No scleral icterus.   Neck: Neck supple.   Cardiovascular: Normal rate, regular rhythm and normal heart sounds. Exam reveals no gallop and no friction rub.   No murmur heard.  Pulmonary/Chest: Effort normal and breath sounds normal. No respiratory distress. She has no wheezes.   Abdominal: Soft. Bowel sounds are normal. She exhibits no distension. There is no tenderness. There is no guarding.   Musculoskeletal: She exhibits edema and tenderness.   Right hand is TTP, swollen with contracture of R fingers and elbow, very limited active movement of R arm or hand, some passive ROM but limited by pain. Splint in place to right arm.      Neurological: She is alert and oriented to person, place, and time.   2/5 strength bilateral plantar flexion  1/5 strength bilateral dorsiflexion   Skin: Skin is warm and dry. She is not diaphoretic.   Psychiatric: She has a normal mood and affect. Her behavior is normal. Judgment and thought content normal.   Nursing note and vitals reviewed.      Significant Labs:   CBC:   Recent Labs   Lab 12/17/18  0450 12/18/18  0510   WBC 7.74 7.93   HGB 10.3* 10.1*   HCT 33.5* 31.5*   * 365*     CMP:   Recent Labs   Lab 12/17/18  0450 12/18/18  0510    136   K 3.8 3.7    101   CO2 27 27   * 134*   BUN 16 19   CREATININE 0.6 0.6   CALCIUM 8.9 8.6*   ANIONGAP 7* 8   EGFRNONAA >60.0 >60.0     Blood Sugars (AccuCheck):  Recent Labs     12/17/18  0809 12/17/18  1244 12/17/18  1724 12/17/18  2109 12/18/18  0822 12/18/18  1234   POCTGLUCOSE 135* 252* 140* 326*  "154* 314*        Significant Imaging: I have reviewed all pertinent imaging results/findings within the past 24 hours.    Assessment/Plan:      * Immobility    Gait disturbance  Localized edema of right hand  Contracture of muscle, right upper arm  Weakness of both lower extremities    - Condition unchanged since admit.   - Patient with progressive immobility and weak over past few months starting five months ago, worsening over past two months. Patient with flexion contracture of right hand, along with right hand edema and weakness as well as "tingling" pain in right elbow over the past few months that has recently worsened. Has seen orthopedics as outpatient with unremarkable Radiology.  - EMG/NCV ordered on 12/18 to further evaluate cause of RUE contractures and leg weakness as per Neurology recs. Cause of contractures and leg weakness unclear and concern for possible motor neuron disease by Neurology.   - PT/OT consulted and evaluated patient and following - OT recommending SNF and  working with family and patient on choices.   - MRI brain done showed "No evidence of acute intracranial pathology"  - US of RUE showed "No thrombus in central veins of the right upper extremity."  - MRI of cervical spine "Mild DJD of C-spine but no stenosis"  - CPK normal at 61  - Neurology following and appreciate recs.   - Pain medication PRN and topical capsaicin to right upper extremity. Pain related to contracture sand improved with placement of right arm splint.   - Edema to right hand and arm related to immobility and improved with splint.      Essential hypertension    · Patient's blood pressure is controlled here in the hospital over past 24 hours.   · Goal for blood pressure is SBP < 140 and DBP < 90 as patient > or = 60 years of age and patient is diabetic based on JNC 8 guidelines.   · Continue current treatment regimen of Norvasc 10 mg po daily ( new med started on this admit) + Lisinopril 40 mg po daily " (home med) and Coreg 25 mg po BID (home med).  · Plan is to monitor patient's blood pressure routinely while patient is hospitalized.      Type 2 diabetes mellitus without complication, without long-term current use of insulin    Suboptimal control in the hospital in past 24 hours. Holding home meds of Sitagliptin and Metformin while patient in hospital.     Blood Sugars (AccuCheck):  Recent Labs     12/17/18  0809 12/17/18  1244 12/17/18  1724 12/17/18  2109 12/18/18  0822 12/18/18  1234   POCTGLUCOSE 135* 252* 140* 326* 154* 314*       · Last HgA1C 6 % and at goal.  · Plan is to increase Levemir from 4 to 6 units twice a day and increase Novolog from 2 to 4 units with meals to treat in hospital.   · Plan is to continue to monitor POCT glucose 4 times a day with each meal and at bedtime and cover with Novolog low dose sliding scale insulin.   · Plan is to continue 1500 cat diabetic diet in the hospital.   · Target pre-meal glucose goal is <140 with all random glucoses <180 in non-critically ill patient.     Vitamin B12 deficiency anemia    - Controlled and stable. H/H 10.3/33.5 at baseline.   - 11/23: B12 175, Ferritin 90, Transferrin 185, Sat Fe: 13, Fe: 35, TIBC: 274  - B12 supplement started at Vitamin B12 1000 mcg po daily and will recheck Vitamin B12 and methylmalonic acid level in am to see if improvement.   - Folate level normal.      Pure hypercholesterolemia    Controlled. Continue Lipitor 40 mg po daily to treat.          VTE Risk Mitigation (From admission, onward)        Ordered     enoxaparin injection 40 mg  Daily      12/18/18 1631     Place SOHEILA hose  Until discontinued      12/14/18 1741     IP VTE HIGH RISK PATIENT  Once      12/14/18 1741              Yodit Issa MD  Department of Hospital Medicine   [facility]

## 2018-12-19 NOTE — CONSULTS
[facility]  Physical Medicine & Rehab  Consult Note    Patient Name: Deisi Cheung  MRN: [id number]  Admission Date: 12/14/2018  Hospital Length of Stay: 0 days  Attending Physician: Yodit Issa MD     Inpatient consult to Physical Medicine & Rehabilitation  Consult performed by: Jenny Galeano NP  Consult requested by:  Yodit Issa MD    Collaborating Physician: Latoya Real MD  Reason for Consult:  Assess rehabilitation needs     Consults  Subjective:     Principal Problem: Immobility    HPI: Deisi Cheung is a 83-year-old female with PMHx of bilateral lower extremity weakness with immobility x 1 year and RUE contracture limiting ADLs. [place] [facility] [place] for with worsening right hand swelling and wrist/hand pain. MRI displayed no evidence of acute intracranial pathology and R upper ext US displayed no thrombus. Neurology consulted and etiology unclear. EMG scheduled 12/19. If unrevealing recommendation to repeat MRI C/T spine. Hospital course complicated by HTN (Norvasc, Lisnopril, and Coreg).     Functional History: Patient lives alone in [place] in a [facility] with threshold to enter. Prior to admission, bed bound and not ambulatory for months family comes to assist her during the day with transfers and ADLs/selfcare/showers. Can mange once in w/c.DME: w/c, RW, rollator.     Hospital Course:   12/15/18: Evaluated by PT. Sit to stand HHA- maxA. Sitting (I).  12/17/18: Evaluated by OT. Sit to stand totalA x 2ppl. UBD totalA. LBD dependence.   12/18/18: Participated w/ OT. R resting hand splint session, prolonged stretch and to prevent further contracture.     Past Medical History:   Diagnosis Date    Arthritis of right knee     Contracture of muscle, right upper arm 12/18/2018    Diabetes mellitus     Hyperlipidemia     Hypertension     Primary osteoarthritis of right knee     Pure hypercholesterolemia 10/19/2015    Weakness of both lower extremities  12/18/2018     Past Surgical History:   Procedure Laterality Date    none       Review of patient's allergies indicates:   Allergen Reactions    Pcn [penicillins]        Scheduled Medications:    amLODIPine  10 mg Oral Daily    atorvastatin  40 mg Oral Daily    carvedilol  25 mg Oral BID WM    cyanocobalamin  1,000 mcg Oral Daily    enoxaparin  40 mg Subcutaneous Daily    insulin aspart U-100  4 Units Subcutaneous TIDWM    insulin detemir U-100  6 Units Subcutaneous BID    lisinopril  40 mg Oral Daily    magnesium oxide  400 mg Oral Daily       PRN Medications: acetaminophen, capsaicin, dextrose 50%, dextrose 50%, glucagon (human recombinant), glucose, glucose, hydrALAZINE, HYDROcodone-acetaminophen, HYDROcodone-acetaminophen, insulin aspart U-100, sodium chloride 0.9%    Family History     Problem Relation (Age of Onset)    Diabetes Sister    Heart failure Sister    Prostate cancer Brother    Stroke Sister        Tobacco Use    Smoking status: Never Smoker    Smokeless tobacco: Never Used   Substance and Sexual Activity    Alcohol use: No     Alcohol/week: 0.0 oz     Frequency: Never    Drug use: No    Sexual activity: No     Review of Systems   Constitutional: Positive for fatigue. Negative for fever.   HENT: Negative for sore throat and trouble swallowing.    Respiratory: Negative for cough and shortness of breath.    Cardiovascular: Negative for chest pain and leg swelling.   Gastrointestinal: Negative for abdominal distention and abdominal pain.   Musculoskeletal: Positive for gait problem. Negative for back pain.   Skin: Negative for color change.   Neurological: Positive for weakness. Negative for light-headedness and headaches.   Psychiatric/Behavioral: Negative for agitation and confusion.     Objective:     Vital Signs (Most Recent):  Temp: 99 °F (37.2 °C) (12/19/18 1145)  Pulse: 68 (12/19/18 1145)  Resp: 16 (12/19/18 1145)  BP: (!) 117/59 (12/19/18 1145)  SpO2: 96 % (12/19/18 1145)     Vital Signs (24h Range):  Temp:  [98 °F (36.7 °C)-99 °F (37.2 °C)] 99 °F (37.2 °C)  Pulse:  [60-85] 68  Resp:  [12-17] 16  SpO2:  [95 %-98 %] 96 %  BP: (117-131)/(59-69) 117/59     Body mass index is 20.52 kg/m².    Physical Exam   Constitutional: She is oriented to person, place, and time. She appears well-developed.   HENT:   Head: Normocephalic and atraumatic.   Eyes: EOM are normal.   Neck: Neck supple.   Pulmonary/Chest: No respiratory distress.   Abdominal: Normal appearance. She exhibits no distension.   Musculoskeletal:   RUE mild swelling, contracted and in sling 1/5, LUE 3/5  RLE, 1/5, LLE 1/5 (could not  off of bed)   Neurological: She is alert and oriented to person, place, and time.   Skin: Skin is warm and dry.   Psychiatric: She has a normal mood and affect. Her behavior is normal.   Vitals reviewed.    Diagnostic Results:   Labs: Reviewed  ECG: Reviewed  US: Reviewed  MRI: Reviewed    Assessment/Plan:     * Immobility    - MRI displayed no evidence of acute intracranial pathology and R upper ext US displayed no thrombus.   - Neurology consulted and etiology unclear.   - EMG scheduled 12/19. If unrevealing recommendation to repeat MRI C/T spine.      Weakness of both lower extremities    - see immobility      Contracture of muscle, right upper arm    - RUE splint in place   - OT/PT eval and treat     Gait disturbance    - Related to prolonged/acute hospital course.     Recommendations  -  Encourage mobility, OOB in chair at least 3 hours per day, and early ambulation as appropriate  -  PT/OT evaluate and treat  -  Pain management  -  Monitor for and prevent skin breakdown and pressure ulcers  · Early mobility, repositioning/weight shifting every 20-30 minutes when sitting, turn patient every 2 hours, proper mattress/overlay and chair cushioning, pressure relief/heel protector boots  -  DVT prophylaxis    -  Reviewed discharge options (IP rehab, SNF, HH therapy, and OP therapy)     Essential  hypertension    - Hospital med following   - Norvasc, Lisnopril, and Coreg          Participating with therapy. Will follow progress and discuss with rehab team for post acute care/rehab recommendation.    Thank you for your consult.     Jenny Galeano NP  Department of Physical Medicine & Rehab  [facility]

## 2018-12-19 NOTE — ASSESSMENT & PLAN NOTE
"Gait disturbance  Localized edema of right hand  Contracture of muscle, right upper arm  Weakness of both lower extremities    - Condition unchanged since admit.   - Patient with progressive immobility and weak over past few months starting five months ago, worsening over past two months. Patient with flexion contracture of right hand, along with right hand edema and weakness as well as "tingling" pain in right elbow over the past few months that has recently worsened. Has seen orthopedics as outpatient with unremarkable Radiology.  - EMG/NCV ordered on 12/18 to further evaluate cause of RUE contractures and leg weakness as per Neurology recs. Cause of contractures and leg weakness unclear and concern for possible motor neuron disease by Neurology.   - PT/OT consulted and evaluated patient and following - OT recommending SNF and  working with family and patient on choices. IP Rehab consult placed also.   - MRI brain done showed "No evidence of acute intracranial pathology"  - US of RUE showed "No thrombus in central veins of the right upper extremity."  - MRI of cervical spine "Mild DJD of C-spine but no stenosis"  - CPK normal at 61  - Neurology following and appreciate recs.   - Pain medication PRN and topical capsaicin to right upper extremity. Pain related to contracture sand improved with placement of right arm splint.   - Edema to right hand and arm related to immobility and improved with splint.   "

## 2018-12-19 NOTE — SUBJECTIVE & OBJECTIVE
Interval History: Patient reports right arm pain controlled and in splint to help with contractures. Neurology following and EMG/NCV ordered for today to evaluate cause of lower extremity weakness and right arm contracture.     Review of Systems   Constitutional: Negative for fatigue and fever.   Respiratory: Negative for cough and shortness of breath.    Cardiovascular: Negative for chest pain, palpitations and leg swelling.   Gastrointestinal: Negative for abdominal pain, constipation, diarrhea and nausea.   Genitourinary: Negative for dysuria, frequency and hematuria.   Musculoskeletal: Positive for arthralgias. Negative for back pain and myalgias.   Skin: Negative for rash.   Neurological: Positive for weakness (RUE and both legs ). Negative for numbness and headaches.   Psychiatric/Behavioral: Negative for agitation and confusion.     Objective:     Vital Signs (Most Recent):  Temp: 99 °F (37.2 °C) (12/19/18 1145)  Pulse: 68 (12/19/18 1145)  Resp: 16 (12/19/18 1145)  BP: (!) 117/59 (12/19/18 1145)  SpO2: 96 % (12/19/18 1145) Vital Signs (24h Range):  Temp:  [98 °F (36.7 °C)-99 °F (37.2 °C)] 99 °F (37.2 °C)  Pulse:  [60-85] 68  Resp:  [12-17] 16  SpO2:  [95 %-98 %] 96 %  BP: (117-131)/(59-69) 117/59     Weight: 50.9 kg (112 lb 3.4 oz)  Body mass index is 20.52 kg/m².  No intake or output data in the 24 hours ending 12/19/18 1445   Physical Exam   Constitutional: She is oriented to person, place, and time. She appears well-developed. No distress.   Frail elderly lady   HENT:   Head: Normocephalic and atraumatic.   Mouth/Throat: No oropharyngeal exudate.   Eyes: No scleral icterus.   Neck: Neck supple.   Cardiovascular: Normal rate, regular rhythm and normal heart sounds. Exam reveals no gallop and no friction rub.   No murmur heard.  Pulmonary/Chest: Effort normal and breath sounds normal. No respiratory distress. She has no wheezes.   Abdominal: Soft. Bowel sounds are normal. She exhibits no distension. There is  no tenderness. There is no guarding.   Musculoskeletal: She exhibits edema and tenderness.   Right hand is TTP, swollen with contracture of R fingers and elbow, very limited active movement of R arm or hand, some passive ROM but limited by pain. Splint in place to right arm.      Neurological: She is alert and oriented to person, place, and time.   Skin: Skin is warm and dry. She is not diaphoretic.   Psychiatric: She has a normal mood and affect. Her behavior is normal. Judgment and thought content normal.   Nursing note and vitals reviewed.      Significant Labs:   CBC:   Recent Labs   Lab 12/18/18  0510 12/19/18  0456   WBC 7.93 8.32   HGB 10.1* 9.9*   HCT 31.5* 32.2*   * 363*     CMP:   Recent Labs   Lab 12/18/18  0510 12/19/18  0456    134*   K 3.7 3.8    100   CO2 27 27   * 165*   BUN 19 21   CREATININE 0.6 0.6   CALCIUM 8.6* 8.8   ANIONGAP 8 7*   EGFRNONAA >60.0 >60.0     Magnesium:   Recent Labs   Lab 12/18/18  0510   MG 1.7     POCT Glucose:   Recent Labs   Lab 12/18/18  1724 12/18/18  2119 12/19/18  1201   POCTGLUCOSE 244* 269* 212*     Recent Labs     12/19/18  0456   [id number]*      Significant Imaging: I have reviewed all pertinent imaging results/findings within the past 24 hours.

## 2018-12-19 NOTE — SUBJECTIVE & OBJECTIVE
Past Medical History:   Diagnosis Date    Arthritis of right knee     Contracture of muscle, right upper arm 12/18/2018    Diabetes mellitus     Hyperlipidemia     Hypertension     Primary osteoarthritis of right knee     Pure hypercholesterolemia 10/19/2015    Weakness of both lower extremities 12/18/2018     Past Surgical History:   Procedure Laterality Date    none       Review of patient's allergies indicates:   Allergen Reactions    Pcn [penicillins]        Scheduled Medications:    amLODIPine  10 mg Oral Daily    atorvastatin  40 mg Oral Daily    carvedilol  25 mg Oral BID WM    cyanocobalamin  1,000 mcg Oral Daily    enoxaparin  40 mg Subcutaneous Daily    insulin aspart U-100  4 Units Subcutaneous TIDWM    insulin detemir U-100  6 Units Subcutaneous BID    lisinopril  40 mg Oral Daily    magnesium oxide  400 mg Oral Daily       PRN Medications: acetaminophen, capsaicin, dextrose 50%, dextrose 50%, glucagon (human recombinant), glucose, glucose, hydrALAZINE, HYDROcodone-acetaminophen, HYDROcodone-acetaminophen, insulin aspart U-100, sodium chloride 0.9%    Family History     Problem Relation (Age of Onset)    Diabetes Sister    Heart failure Sister    Prostate cancer Brother    Stroke Sister        Tobacco Use    Smoking status: Never Smoker    Smokeless tobacco: Never Used   Substance and Sexual Activity    Alcohol use: No     Alcohol/week: 0.0 oz     Frequency: Never    Drug use: No    Sexual activity: No     Review of Systems   Constitutional: Positive for fatigue. Negative for fever.   HENT: Negative for sore throat and trouble swallowing.    Respiratory: Negative for cough and shortness of breath.    Cardiovascular: Negative for chest pain and leg swelling.   Gastrointestinal: Negative for abdominal distention and abdominal pain.   Musculoskeletal: Positive for gait problem. Negative for back pain.   Skin: Negative for color change.   Neurological: Positive for weakness. Negative  for light-headedness and headaches.   Psychiatric/Behavioral: Negative for agitation and confusion.     Objective:     Vital Signs (Most Recent):  Temp: 99 °F (37.2 °C) (12/19/18 1145)  Pulse: 68 (12/19/18 1145)  Resp: 16 (12/19/18 1145)  BP: (!) 117/59 (12/19/18 1145)  SpO2: 96 % (12/19/18 1145)    Vital Signs (24h Range):  Temp:  [98 °F (36.7 °C)-99 °F (37.2 °C)] 99 °F (37.2 °C)  Pulse:  [60-85] 68  Resp:  [12-17] 16  SpO2:  [95 %-98 %] 96 %  BP: (117-131)/(59-69) 117/59     Body mass index is 20.52 kg/m².    Physical Exam   Constitutional: She is oriented to person, place, and time. She appears well-developed.   HENT:   Head: Normocephalic and atraumatic.   Eyes: EOM are normal. Pupils are equal, round, and reactive to light.   Neck: Neck supple.   Pulmonary/Chest: No respiratory distress.   Abdominal: Normal appearance. She exhibits no distension.   Musculoskeletal:   RUE mild swelling, contracted and in sling 1/5, LUE 3/5  RLE, 1/5, LLE 1/5 (could not  off of bed)   Neurological: She is alert and oriented to person, place, and time.   Skin: Skin is warm and dry.   Psychiatric: She has a normal mood and affect. Her behavior is normal.   Vitals reviewed.    NEUROLOGICAL EXAMINATION:     MENTAL STATUS   Oriented to person, place, and time.     CRANIAL NERVES     CN III, IV, VI   Pupils are equal, round, and reactive to light.  Extraocular motions are normal.       Diagnostic Results:   Labs: Reviewed  ECG: Reviewed  US: Reviewed  MRI: Reviewed

## 2018-12-19 NOTE — ASSESSMENT & PLAN NOTE
- Controlled and stable.   - 11/23: B12 175, Ferritin 90, Transferrin 185, Sat Fe: 13, Fe: 35, TIBC: 274  - Patient started on B12 supplement at Vitamin B12 1000 mcg po daily as outpatient in 11/2018 and repeat Vitamin B12 level on this admit 1062.   - Folate level normal.

## 2018-12-19 NOTE — PLAN OF CARE
Unable to perform EMG/NCS today. Would recommend MRI Cervical and thoracic spine with contrast to r/o structural etiology for weakness and spasticity. Since suspicion for MND is low, can complete EMG/NCS on outpatient basis if repeat imaging is unrevealing.    Gabriela Lopez PA-C  General Neurology Consult  Neuro Consult [facility] # [id number]

## 2018-12-19 NOTE — HOSPITAL COURSE
12/15/18: Evaluated by PT. Sit to stand HHA- maxA. Sitting (I).  12/17/18: Evaluated by OT. Sit to stand totalA x 2ppl. UBD totalA. LBD dependence.   12/18/18: Participated w/ OT. R resting hand splint session, prolonged stretch and to prevent further contracture.

## 2018-12-19 NOTE — PLAN OF CARE
Problem: Adult Inpatient Plan of Care  Goal: Plan of Care Review  Outcome: Ongoing (interventions implemented as appropriate)  Treated patient for pain prn.  No signs of distress.  Patient remained alert, but confused at times.  No further complaints this shift.

## 2018-12-19 NOTE — PLAN OF CARE
SW following for DC needs. SW in communication with CM.    Patient's daughter in law, Kayli, called SW with SNF preferences. The preferences are 1. [facility]. [facility] 3. [facility].     SW sent to the above via [employer].      12/19/18 0912   Post-Acute Status   Post-Acute Authorization Placement   Post-Acute Placement Status Referrals Sent     Maria Del Rosario Umanzor LMSW  [facility]  [phone]

## 2018-12-19 NOTE — PROGRESS NOTES
"[facility] Hospital Medicine  Progress Note    Patient Name: Deisi Cheung  MRN: [id number]  Patient Class: OP- Observation   Admission Date: 12/14/2018  Length of Stay: 0 days  Attending Physician: Yodit Issa MD  Primary Care Provider: Santos Mena MD    [facility] Medicine Team: [facility] HOSP MED Z Yodit Issa MD    Subjective:     Principal Problem:Immobility    HPI:  82 YO lady with HTN, HLD, Type 2 diabetes on oral hypoglycemics and osteoarthritis who presented with complaint of presistant right arm weakness and right hand swelling for past 5 months. She denies injury or trauma. She has weakness of the entire RUE. She has difficulty with active shoulder, elbow, wrist, and finger motion. She has flexion contracture of the fingers. She notes edema of the hand which is improved with elevation. She is able to passively extend them to some degree however limited by pain.  She denies notable numbness or tingling. Confusion, trauma, swelling in any other extremities any rashes, fever or chills, neck pain,  hx of known CVA. She was evaluated by Orthopedics in past with  prior x-rays which are unremarkable. So she was sent to Neurology for further evaluation and recommended an EMG which has not been done yet. On presentation to ED, she had an MRI brain done that showed " No evidence of acute intracranial pathology.Moderate generalized cerebral volume loss and moderate chronic microvascular ischemic changes" and US of RUE that showed "No thrombus in central veins of the right upper extremity."   Of note pt has been bed bound and not ambulatory for months (no clear inciting events) she has worked with PT and OT in past for her knee pain but has progressively has become bed bound and relies on her family for her ADLs. Family reports that they can no longer meet all her need and are requesting admission for placement to nursing home. We will admit for safe dispo planning, CPK has been " ordered.    Hospital Course:  Mrs. Cheung was admitted for nursing home placement and Neurology evaluation as to cause of progressive immobility and loss o use of right arm and leg weakness. PT consulted. H/H at baseline with anemia. B12 low, started on supplement. Patient hypertensive in absence of pain and started on Norvasc 10 mg po daily to treat. Neurology consulted, MRI of C-spine ordered and returned with only mild DJD of cervical spine but no spinal stenosis and does not explain patient's neurologic symptoms. EMG and NCV ordered on 12/18 to further work-up cause of progressive weakness. PT/OT consulted, recommended SNF placement and  working with family on placement choices. Pain to right arm improved with placement of splint to right hand and forearm to help with contractures. CPK normal at 61.     Interval History: Patient reports right arm pain controlled and in splint to help with contractures. Neurology following and EMG/NCV ordered for today to evaluate cause of lower extremity weakness and right arm contracture.     Review of Systems   Constitutional: Negative for fatigue and fever.   Respiratory: Negative for cough and shortness of breath.    Cardiovascular: Negative for chest pain, palpitations and leg swelling.   Gastrointestinal: Negative for abdominal pain, constipation, diarrhea and nausea.   Genitourinary: Negative for dysuria, frequency and hematuria.   Musculoskeletal: Positive for arthralgias. Negative for back pain and myalgias.   Skin: Negative for rash.   Neurological: Positive for weakness (RUE and both legs ). Negative for numbness and headaches.   Psychiatric/Behavioral: Negative for agitation and confusion.     Objective:     Vital Signs (Most Recent):  Temp: 99 °F (37.2 °C) (12/19/18 1145)  Pulse: 68 (12/19/18 1145)  Resp: 16 (12/19/18 1145)  BP: (!) 117/59 (12/19/18 1145)  SpO2: 96 % (12/19/18 1145) Vital Signs (24h Range):  Temp:  [98 °F (36.7 °C)-99 °F (37.2 °C)] 99  °F (37.2 °C)  Pulse:  [60-85] 68  Resp:  [12-17] 16  SpO2:  [95 %-98 %] 96 %  BP: (117-131)/(59-69) 117/59     Weight: 50.9 kg (112 lb 3.4 oz)  Body mass index is 20.52 kg/m².  No intake or output data in the 24 hours ending 12/19/18 1445   Physical Exam   Constitutional: She is oriented to person, place, and time. She appears well-developed. No distress.   Frail elderly lady   HENT:   Head: Normocephalic and atraumatic.   Mouth/Throat: No oropharyngeal exudate.   Eyes: No scleral icterus.   Neck: Neck supple.   Cardiovascular: Normal rate, regular rhythm and normal heart sounds. Exam reveals no gallop and no friction rub.   No murmur heard.  Pulmonary/Chest: Effort normal and breath sounds normal. No respiratory distress. She has no wheezes.   Abdominal: Soft. Bowel sounds are normal. She exhibits no distension. There is no tenderness. There is no guarding.   Musculoskeletal: She exhibits edema and tenderness.   Right hand is TTP, swollen with contracture of R fingers and elbow, very limited active movement of R arm or hand, some passive ROM but limited by pain. Splint in place to right arm.      Neurological: She is alert and oriented to person, place, and time.   Skin: Skin is warm and dry. She is not diaphoretic.   Psychiatric: She has a normal mood and affect. Her behavior is normal. Judgment and thought content normal.   Nursing note and vitals reviewed.      Significant Labs:   CBC:   Recent Labs   Lab 12/18/18  0510 12/19/18  0456   WBC 7.93 8.32   HGB 10.1* 9.9*   HCT 31.5* 32.2*   * 363*     CMP:   Recent Labs   Lab 12/18/18  0510 12/19/18  0456    134*   K 3.7 3.8    100   CO2 27 27   * 165*   BUN 19 21   CREATININE 0.6 0.6   CALCIUM 8.6* 8.8   ANIONGAP 8 7*   EGFRNONAA >60.0 >60.0     Magnesium:   Recent Labs   Lab 12/18/18  0510   MG 1.7     POCT Glucose:   Recent Labs   Lab 12/18/18  1724 12/18/18 2119 12/19/18  1201   POCTGLUCOSE 244* 269* 212*     Recent Labs      "12/19/18  0456   [id number] 1062*      Significant Imaging: I have reviewed all pertinent imaging results/findings within the past 24 hours.    Assessment/Plan:      * Immobility    Gait disturbance  Localized edema of right hand  Contracture of muscle, right upper arm  Weakness of both lower extremities    - Condition unchanged since admit.   - Patient with progressive immobility and weak over past few months starting five months ago, worsening over past two months. Patient with flexion contracture of right hand, along with right hand edema and weakness as well as "tingling" pain in right elbow over the past few months that has recently worsened. Has seen orthopedics as outpatient with unremarkable Radiology.  - EMG/NCV ordered on 12/18 to further evaluate cause of RUE contractures and leg weakness as per Neurology recs. Cause of contractures and leg weakness unclear and concern for possible motor neuron disease by Neurology.   - PT/OT consulted and evaluated patient and following - OT recommending SNF and  working with family and patient on choices. IP Rehab consult placed also.   - MRI brain done showed "No evidence of acute intracranial pathology"  - US of RUE showed "No thrombus in central veins of the right upper extremity."  - MRI of cervical spine "Mild DJD of C-spine but no stenosis"  - CPK normal at 61  - Neurology following and appreciate recs.   - Pain medication PRN and topical capsaicin to right upper extremity. Pain related to contracture sand improved with placement of right arm splint.   - Edema to right hand and arm related to immobility and improved with splint.      Essential hypertension    · Patient's blood pressure is controlled here in the hospital over past 24 hours.   · Goal for blood pressure is SBP < 140 and DBP < 90 as patient > or = 60 years of age and patient is diabetic based on JNC 8 guidelines.   · Continue current treatment regimen of Norvasc 10 mg po daily ( new med " "started on this admit) + Lisinopril 40 mg po daily (home med) and Coreg 25 mg po BID (home med).  · Plan is to monitor patient's blood pressure routinely while patient is hospitalized.      Type 2 diabetes mellitus without complication, without long-term current use of insulin    Improved control in the hospital in past 24 hours. Holding home meds of Sitagliptin and Metformin while patient in hospital and treating with insulin.     Blood Sugars (AccuCheck):    Recent Labs     12/17/18  2109 12/18/18  0822 12/18/18  1234 12/18/18  1724 12/18/18  2119 12/19/18  1201   POCTGLUCOSE 326* 154* 314* 244* 269* 212*       · Last HgA1C 6 % and at goal.  · Plan is to continue Levemir 6 units twice a day and Novolog 4 units with meals to treat in hospital.   · Plan is to continue to monitor POCT glucose 4 times a day with each meal and at bedtime and cover with Novolog low dose sliding scale insulin.   · Plan is to continue 1500 cat diabetic diet in the hospital.   · Target pre-meal glucose goal is <140 with all random glucoses <180 in non-critically ill patient.     Vitamin B12 deficiency anemia    - Controlled and stable.   - 11/23: B12 175, Ferritin 90, Transferrin 185, Sat Fe: 13, Fe: 35, TIBC: 274  - Patient started on B12 supplement at Vitamin B12 1000 mcg po daily as outpatient in 11/2018 and repeat Vitamin B12 level on this admit 1062.   - Folate level normal.      Pure hypercholesterolemia    Controlled. Continue Lipitor 40 mg po daily to treat.      Localized edema    R hand edema and weakness, pain improved  Unclear exact etiology  MRI brain ana maria howed "No evidence of acute intracranial pathology"  US showed "No thrombus in central veins of the right upper extremity."  Pt was supposed to have outpt EMG done by neurology  PT and OT  Pain medication PRN  Topical capsaicin        VTE Risk Mitigation (From admission, onward)        Ordered     enoxaparin injection 40 mg  Daily      12/18/18 1631     Place SOHEILA feldmane  " Until discontinued      12/14/18 1741     IP VTE HIGH RISK PATIENT  Once      12/14/18 1741              Yodit Issa MD  Department of Hospital Medicine   [facility]

## 2018-12-19 NOTE — ASSESSMENT & PLAN NOTE
Improved control in the hospital in past 24 hours. Holding home meds of Sitagliptin and Metformin while patient in hospital and treating with insulin.     Blood Sugars (AccuCheck):    Recent Labs     12/17/18  2109 12/18/18  0822 12/18/18  1234 12/18/18  1724 12/18/18  2119 12/19/18  1201   POCTGLUCOSE 326* 154* 314* 244* 269* 212*       · Last HgA1C 6 % and at goal.  · Plan is to continue Levemir 6 units twice a day and Novolog 4 units with meals to treat in hospital.   · Plan is to continue to monitor POCT glucose 4 times a day with each meal and at bedtime and cover with Novolog low dose sliding scale insulin.   · Plan is to continue 1500 cat diabetic diet in the hospital.   · Target pre-meal glucose goal is <140 with all random glucoses <180 in non-critically ill patient.

## 2018-12-19 NOTE — CONSULTS
Inpatient consult to Physical Medicine Rehab  Consult performed by: Jenny Galeano NP  Consult ordered by: Yodit Issa MD  Reason for consult: Assess rehab needs      Reviewed patient history and current admission.  Rehab team following.  Full consult to follow.    BAUTISTA Yusuf, FNP-C  Physical Medicine & Rehabilitation   12/19/2018  Spectralink: [id number]

## 2018-12-19 NOTE — HPI
Deisi Cheung is a 83-year-old female with PMHx of bilateral lower extremity weakness with immobility x 1 year and RUE contracture limiting ADLs. Patient presented to [facility] on 12/14/18 for with worsening right hand swelling and wrist/hand pain. MRI displayed no evidence of acute intracranial pathology and R upper ext US displayed no thrombus. Neurology consulted and etiology unclear. EMG scheduled 12/19. If unrevealing recommendation to repeat MRI C/T spine. Hospital course complicated by HTN (Norvasc, Lisnopril, and Coreg).     Functional History: Patient lives alone in [place] in a H with threshold to enter. Prior to admission, bed bound and not ambulatory for months family comes to assist her during the day with transfers and ADLs/selfcare/showers. Can mange once in w/c.DME: w/c, [employer], [employer].

## 2018-12-19 NOTE — ASSESSMENT & PLAN NOTE
- MRI displayed no evidence of acute intracranial pathology and R upper ext US displayed no thrombus.   - Neurology consulted and etiology unclear.   - EMG scheduled 12/19. If unrevealing recommendation to repeat MRI C/T spine.

## 2018-12-20 NOTE — PLAN OF CARE
Problem: Physical Therapy Goal  Goal: Physical Therapy Goal  Pt does not require additional acute PT services at this time d/t baseline c functional mobility.    Pt educated on asking medical staff for PT consult if changes in functional status occurs. - v/u        Outcome: Outcome(s) achieved Date Met: 12/20/18  Eval and D/C from acute PT services    Kasi Baker DPT  12/20/2018

## 2018-12-20 NOTE — SUBJECTIVE & OBJECTIVE
Interval History: Right arm and leg weakness unchanged. Patient states right arm pain 6/10. Patient had MRI of cervical and thoracic spine with contrast last night and findings were unremarkable.     Review of Systems   Constitutional: Negative for fatigue and fever.   Respiratory: Negative for cough and shortness of breath.    Cardiovascular: Negative for chest pain, palpitations and leg swelling.   Gastrointestinal: Negative for abdominal pain, constipation, diarrhea and nausea.   Genitourinary: Negative for dysuria, frequency and hematuria.   Musculoskeletal: Positive for arthralgias. Negative for back pain and myalgias.   Skin: Negative for rash.   Neurological: Positive for weakness (RUE and both legs ). Negative for numbness and headaches.   Psychiatric/Behavioral: Negative for agitation and confusion.     Objective:     Vital Signs (Most Recent):  Temp: 98.6 °F (37 °C) (12/20/18 1159)  Pulse: 73 (12/20/18 1159)  Resp: 18 (12/20/18 1159)  BP: 136/70 (12/20/18 1159)  SpO2: 96 % (12/20/18 1159) Vital Signs (24h Range):  Temp:  [97.7 °F (36.5 °C)-99 °F (37.2 °C)] 98.6 °F (37 °C)  Pulse:  [65-81] 73  Resp:  [17-18] 18  SpO2:  [95 %-97 %] 96 %  BP: (136-176)/(70-81) 136/70     Weight: 50.9 kg (112 lb 3.4 oz)  Body mass index is 20.52 kg/m².    Intake/Output Summary (Last 24 hours) at 12/20/2018 1408  Last data filed at 12/20/2018 0845  Gross per 24 hour   Intake 240 ml   Output --   Net 240 ml      Physical Exam   Constitutional: She is oriented to person, place, and time. She appears well-developed. No distress.   Frail elderly lady   HENT:   Head: Normocephalic and atraumatic.   Mouth/Throat: No oropharyngeal exudate.   Eyes: No scleral icterus.   Neck: Neck supple.   Cardiovascular: Normal rate, regular rhythm and normal heart sounds. Exam reveals no gallop and no friction rub.   No murmur heard.  Pulmonary/Chest: Effort normal and breath sounds normal. No respiratory distress. She has no wheezes.   Abdominal:  Soft. Bowel sounds are normal. She exhibits no distension. There is no tenderness. There is no guarding.   Musculoskeletal: She exhibits edema and tenderness.   Right hand is TTP, swollen with contracture of R fingers and elbow, very limited active movement of R arm or hand, some passive ROM but limited by pain. Splint in place to right arm.      Neurological: She is alert and oriented to person, place, and time.   Skin: Skin is warm and dry. She is not diaphoretic.   Psychiatric: She has a normal mood and affect. Her behavior is normal. Thought content normal.   Nursing note and vitals reviewed.      Significant Labs:   CBC:   Recent Labs   Lab 12/19/18  0456 12/20/18  0418   WBC 8.32 8.21   HGB 9.9* 9.9*   HCT 32.2* 31.6*   * 404*     CMP:   Recent Labs   Lab 12/19/18  0456 12/20/18  0418   * 136   K 3.8 3.9    103   CO2 27 26   * 137*   BUN 21 14   CREATININE 0.6 0.6   CALCIUM 8.8 8.9   ANIONGAP 7* 7*   EGFRNONAA >60.0 >60.0       POCT Glucose:   Recent Labs   Lab 12/19/18  2116 12/20/18  0741 12/20/18  1317   POCTGLUCOSE 162* 116* 188*       Significant Imaging: I have reviewed all pertinent imaging results/findings within the past 24 hours.

## 2018-12-20 NOTE — ASSESSMENT & PLAN NOTE
- MRI displayed no evidence of acute intracranial pathology and R upper ext US displayed no thrombus.   - Neurology consulted and etiology unclear.   - EMG scheduled 12/19. If unrevealing recommendation to repeat MRI C/T spine  - consider MRI L spine as well 2/2 BLE weakness

## 2018-12-20 NOTE — PROGRESS NOTES
"[facility] Hospital Medicine  Progress Note    Patient Name: Deisi Cheung  MRN: [id number]  Patient Class: OP- Observation   Admission Date: 12/14/2018  Length of Stay: 0 days  Attending Physician: Yodit Issa MD  Primary Care Provider: Sanots Mena MD    [facility] Medicine Team: [facility]OS[facility] MED Z Yodit Issa MD    Subjective:     Principal Problem:Immobility    HPI:  82 YO lady with HTN, HLD, Type 2 diabetes on oral hypoglycemics and osteoarthritis who presented with complaint of presistant right arm weakness and right hand swelling for past 5 months. She denies injury or trauma. She has weakness of the entire RUE. She has difficulty with active shoulder, elbow, wrist, and finger motion. She has flexion contracture of the fingers. She notes edema of the hand which is improved with elevation. She is able to passively extend them to some degree however limited by pain.  She denies notable numbness or tingling. Confusion, trauma, swelling in any other extremities any rashes, fever or chills, neck pain,  hx of known CVA. She was evaluated by Orthopedics in past with  prior x-rays which are unremarkable. So she was sent to Neurology for further evaluation and recommended an EMG which has not been done yet. On presentation to ED, she had an MRI brain done that showed " No evidence of acute intracranial pathology.Moderate generalized cerebral volume loss and moderate chronic microvascular ischemic changes" and US of RUE that showed "No thrombus in central veins of the right upper extremity."   Of note pt has been bed bound and not ambulatory for months (no clear inciting events) she has worked with PT and OT in past for her knee pain but has progressively has become bed bound and relies on her family for her ADLs. Family reports that they can no longer meet all her need and are requesting admission for placement to nursing home. We will admit for safe dispo planning, CPK has been " ordered.    Hospital Course:  Mrs. Cheung was admitted for nursing home placement and Neurology evaluation as to cause of progressive immobility and loss o use of right arm and leg weakness. PT consulted. H/H at baseline with anemia. B12 low, started on supplement. Patient hypertensive in absence of pain and started on Norvasc 10 mg po daily to treat. Neurology consulted, MRI of C-spine ordered and returned with only mild DJD of cervical spine but no spinal stenosis and does not explain patient's neurologic symptoms. EMG and NCV ordered on 12/18 to further work-up cause of progressive weakness. PT/OT consulted, recommended SNF placement and  working with family on placement choices. Pain to right arm improved with placement of splint to right hand and forearm to help with contractures. CPK normal at 61. Repeat MRI of thoracic and cervical spine with contrast done on 12/19 as per Neurology recs to make sure no structural cause of neurologic symptoms and repeat MRI unremarkable for any significant disc disease or spinal stenosis to explain patient's neurologic symptoms. Neurology states NCV/EMG can be done as outpatient as low suspicion for motor neuron disease.  working on placement with family.     Interval History: Right arm and leg weakness unchanged. Patient states right arm pain 6/10. Patient had MRI of cervical and thoracic spine with contrast last night and findings were unremarkable.     Review of Systems   Constitutional: Negative for fatigue and fever.   Respiratory: Negative for cough and shortness of breath.    Cardiovascular: Negative for chest pain, palpitations and leg swelling.   Gastrointestinal: Negative for abdominal pain, constipation, diarrhea and nausea.   Genitourinary: Negative for dysuria, frequency and hematuria.   Musculoskeletal: Positive for arthralgias. Negative for back pain and myalgias.   Skin: Negative for rash.   Neurological: Positive for weakness (RUE and  both legs ). Negative for numbness and headaches.   Psychiatric/Behavioral: Negative for agitation and confusion.     Objective:     Vital Signs (Most Recent):  Temp: 98.6 °F (37 °C) (12/20/18 1159)  Pulse: 73 (12/20/18 1159)  Resp: 18 (12/20/18 1159)  BP: 136/70 (12/20/18 1159)  SpO2: 96 % (12/20/18 1159) Vital Signs (24h Range):  Temp:  [97.7 °F (36.5 °C)-99 °F (37.2 °C)] 98.6 °F (37 °C)  Pulse:  [65-81] 73  Resp:  [17-18] 18  SpO2:  [95 %-97 %] 96 %  BP: (136-176)/(70-81) 136/70     Weight: 50.9 kg (112 lb 3.4 oz)  Body mass index is 20.52 kg/m².    Intake/Output Summary (Last 24 hours) at 12/20/2018 1408  Last data filed at 12/20/2018 0845  Gross per 24 hour   Intake 240 ml   Output --   Net 240 ml      Physical Exam   Constitutional: She is oriented to person, place, and time. She appears well-developed. No distress.   Frail elderly lady   HENT:   Head: Normocephalic and atraumatic.   Mouth/Throat: No oropharyngeal exudate.   Eyes: No scleral icterus.   Neck: Neck supple.   Cardiovascular: Normal rate, regular rhythm and normal heart sounds. Exam reveals no gallop and no friction rub.   No murmur heard.  Pulmonary/Chest: Effort normal and breath sounds normal. No respiratory distress. She has no wheezes.   Abdominal: Soft. Bowel sounds are normal. She exhibits no distension. There is no tenderness. There is no guarding.   Musculoskeletal: She exhibits edema and tenderness.   Right hand is TTP, swollen with contracture of R fingers and elbow, very limited active movement of R arm or hand, some passive ROM but limited by pain. Splint in place to right arm.      Neurological: She is alert and oriented to person, place, and time.   Skin: Skin is warm and dry. She is not diaphoretic.   Psychiatric: She has a normal mood and affect. Her behavior is normal. Thought content normal.   Nursing note and vitals reviewed.      Significant Labs:   CBC:   Recent Labs   Lab 12/19/18  0456 12/20/18  0418   WBC 8.32 8.21   HGB  "9.9* 9.9*   HCT 32.2* 31.6*   * 404*     CMP:   Recent Labs   Lab 12/19/18  0456 12/20/18  0418   * 136   K 3.8 3.9    103   CO2 27 26   * 137*   BUN 21 14   CREATININE 0.6 0.6   CALCIUM 8.8 8.9   ANIONGAP 7* 7*   EGFRNONAA >60.0 >60.0       POCT Glucose:   Recent Labs   Lab 12/19/18  2116 12/20/18  0741 12/20/18  1317   POCTGLUCOSE 162* 116* 188*       Significant Imaging: I have reviewed all pertinent imaging results/findings within the past 24 hours.    Assessment/Plan:      * Immobility    Gait disturbance  Localized edema of right hand  Contracture of muscle, right upper arm  Weakness of both lower extremities    - Condition unchanged since admit.   - Patient with progressive immobility and weak over past few months starting five months ago, worsening over past two months. Patient with flexion contracture of right hand, along with right hand edema and weakness as well as "tingling" pain in right elbow over the past few months that has recently worsened. Has seen orthopedics as outpatient with unremarkable Radiology.  - EMG/NCV ordered on 12/18 to further evaluate cause of RUE contractures and leg weakness as per Neurology recs but Neurology states can be done as outpatient as low .suspicion for motor neuron disease.  - PT/OT consulted and evaluated patient and following - OT recommending SNF and  working with family and patient on choices. IP Rehab consult placed also.   - MRI brain done showed "No evidence of acute intracranial pathology"  - US of RUE showed "No thrombus in central veins of the right upper extremity."  - MRI of cervical spine "Mild DJD of C-spine but no stenosis". Repeat MRI of thoracic and cervical spine on 12/19 with contrast with no stenosis or significant disc disease so no structural cause for right arm or leg weakness.   - CPK normal at 61  - Neurology following and appreciate recs.   - Pain medication PRN and topical capsaicin to right upper " extremity. Pain related to contracture sand improved with placement of right arm splint.   - Edema to right hand and arm related to immobility and improved with splint.      Essential hypertension    · Patient's blood pressure is controlled here in the hospital over past 24 hours.   · Goal for blood pressure is SBP < 140 and DBP < 90 as patient > or = 60 years of age and patient is diabetic based on JNC 8 guidelines.   · Continue current treatment regimen of Norvasc 10 mg po daily ( new med started on this admit) + Lisinopril 40 mg po daily (home med) and Coreg 25 mg po BID (home med).  · Plan is to monitor patient's blood pressure routinely while patient is hospitalized.      Type 2 diabetes mellitus without complication, without long-term current use of insulin    Improved control in the hospital in past 24 hours. Holding home meds of Sitagliptin and Metformin while patient in hospital and treating with insulin.     Blood Sugars (AccuCheck):    Recent Labs     12/18/18  2119 12/19/18  1201 12/19/18  1619 12/19/18  2116 12/20/18  0741 12/20/18  1317   POCTGLUCOSE 269* 212* 133* 162* 116* 188*       · Last HgA1C 6 % and at goal.  · Plan is to continue Levemir 6 units twice a day and Novolog 4 units with meals to treat in hospital.   · Plan is to continue to monitor POCT glucose 4 times a day with each meal and at bedtime and cover with Novolog low dose sliding scale insulin.   · Plan is to continue 1500 cat diabetic diet in the hospital.   · Target pre-meal glucose goal is <140 with all random glucoses <180 in non-critically ill patient.     Vitamin B12 deficiency anemia    - Controlled and stable.   - 11/23: B12 175, Ferritin 90, Transferrin 185, Sat Fe: 13, Fe: 35, TIBC: 274  - Patient started on B12 supplement at Vitamin B12 1000 mcg po daily as outpatient in 11/2018 and repeat Vitamin B12 level on this admit 1062.   - Folate level normal.      Pure hypercholesterolemia    Controlled. Continue Lipitor 40 mg po  "daily to treat.      Localized edema    R hand edema and weakness, pain improved  Unclear exact etiology  MRI brain ana maria howed "No evidence of acute intracranial pathology"  US showed "No thrombus in central veins of the right upper extremity."  Pt was supposed to have outpt EMG done by neurology  PT and OT  Pain medication PRN  Topical capsaicin        VTE Risk Mitigation (From admission, onward)        Ordered     enoxaparin injection 40 mg  Daily      12/18/18 1631     Place SOHEILA hose  Until discontinued      12/14/18 1741     IP VTE HIGH RISK PATIENT  Once      12/14/18 1741              Yodit Issa MD  Department of Hospital Medicine   [facility]  "

## 2018-12-20 NOTE — SUBJECTIVE & OBJECTIVE
Interval History 12/20/2018:  Patient is seen for follow-up rehab evaluation and recommendations: Low level with therapy.     HPI, Past Medical, Family, and Social History remains the same as documented in the initial encounter.    Scheduled Medications:    amLODIPine  10 mg Oral Daily    atorvastatin  40 mg Oral Daily    carvedilol  25 mg Oral BID WM    cyanocobalamin  1,000 mcg Oral Daily    enoxaparin  40 mg Subcutaneous Daily    insulin aspart U-100  4 Units Subcutaneous TIDWM    insulin detemir U-100  6 Units Subcutaneous BID    lisinopril  40 mg Oral Daily    magnesium oxide  400 mg Oral Daily    tuberculin  5 Units Intradermal Once       Diagnostic Results: Labs: Reviewed    PRN Medications: acetaminophen, capsaicin, dextrose 50%, dextrose 50%, glucagon (human recombinant), glucose, glucose, hydrALAZINE, HYDROcodone-acetaminophen, HYDROcodone-acetaminophen, insulin aspart U-100, sodium chloride 0.9%    Review of Systems   Constitutional: Positive for fatigue. Negative for fever.   HENT: Negative for trouble swallowing and voice change.    Respiratory: Negative for cough and shortness of breath.    Cardiovascular: Negative for chest pain and leg swelling.   Gastrointestinal: Negative for abdominal distention and abdominal pain.   Musculoskeletal: Positive for gait problem.        Joint stiffness   Skin: Negative for color change.   Neurological: Positive for weakness. Negative for light-headedness and headaches.   Psychiatric/Behavioral: Negative for agitation and confusion.     Objective:     Vital Signs (Most Recent):  Temp: 98.6 °F (37 °C) (12/20/18 1159)  Pulse: 73 (12/20/18 1159)  Resp: 18 (12/20/18 1159)  BP: 136/70 (12/20/18 1159)  SpO2: 96 % (12/20/18 1159)    Vital Signs (24h Range):  Temp:  [97.7 °F (36.5 °C)-99 °F (37.2 °C)] 98.6 °F (37 °C)  Pulse:  [65-81] 73  Resp:  [17-18] 18  SpO2:  [95 %-97 %] 96 %  BP: (136-176)/(70-81) 136/70     Physical Exam   Constitutional: She is oriented to  person, place, and time. She appears well-developed.   HENT:   Head: Normocephalic and atraumatic.   Eyes: EOM are normal. Pupils are equal, round, and reactive to light.   Neck: Neck supple.   Pulmonary/Chest: No respiratory distress.   Abdominal: Normal appearance. She exhibits no distension.   Musculoskeletal:   RUE mild swelling, contracted and in sling 1/5, LUE 3/5  RLE, 1/5, LLE 1/5 (could not  off of bed)   Neurological: She is alert and oriented to person, place, and time.   Skin: Skin is warm and dry.   Psychiatric: She has a normal mood and affect. Her behavior is normal.   Vitals reviewed.    NEUROLOGICAL EXAMINATION:     MENTAL STATUS   Oriented to person, place, and time.     CRANIAL NERVES     CN III, IV, VI   Pupils are equal, round, and reactive to light.  Extraocular motions are normal.

## 2018-12-20 NOTE — PT/OT/SLP RE-EVAL
"Physical Therapy Re-evaluation  And Discharge    Patient Name:  Deisi Cheung   MRN:  [id number]    Recommendations:     Discharge Recommendations:  nursing facility, basic(Pt requires 24hr care)   Discharge Equipment Recommendations: none   Barriers to discharge: Decreased caregiver support    Assessment:     Deisi Cheung is a 83 y.o. female admitted with a medical diagnosis of Immobility.  She presents with the following impairments/functional limitations:  weakness, impaired functional mobilty, gait instability, impaired endurance, impaired balance, impaired self care skills, decreased lower extremity function, decreased upper extremity function, decreased ROM, pain, impaired joint extensibility, impaired muscle length, decreased safety awareness, decreased coordination.     Pt at functional baseline at this time and does not require additional acute PT services.  Pt performing bed mobility c min-total A and transfer c total A.  Pt demo BLE weakness and limited ROM d/t contracture.  Pt c mod c/o RUE pain throughout session. Recommending pt return home c HHPT if able to receive 24hr care or will need NH for halfway care.    Recent Surgery: * No surgery found *      Plan:     During this hospitalization, patient to be seen   to address the above listed problems via gait training, therapeutic activities, therapeutic exercises, neuromuscular re-education  · Plan of Care Expires:  12/20/18   Plan of Care Reviewed with: patient    Subjective     Communicated with RN prior to session.  Patient found HOB elevated upon PT entry to room, agreeable to evaluation.      Chief Complaint: RUE pain  Patient comments/goals: "I'm not able to do much. I needed help with everything at home."  Pain/Comfort:  · Pain Rating 1: 8/10  · Location - Side 1: Right  · Location - Orientation 1: generalized  · Location 1: arm  · Pain Addressed 1: Pre-medicate for activity, Reposition  · Pain Rating Post-Intervention 1: 8/10    Patients " cultural, spiritual, Alevism conflicts given the current situation: no      Objective:     Patient found with: (RUE splint)     General Precautions: Standard, fall   Orthopedic Precautions:N/A   Braces: (RUE splint)     Exams:  · Cognitive Exam:  Patient is oriented to Person, Place, Time and Situation  · Sensation:    · -       Intact  · RLE ROM: Deficits: Limited ankle DF and knee flexion  · RLE Strength: grossly 1+/5  · LLE ROM: Deficits: limted ankle DF and knee flexion  · LLE Strength: grossly 1+/5    Functional Mobility:  · Bed Mobility:     · Rolling Left:  total assistance  · Rolling Right: minimum assistance  · Scooting: maximal assistance  · Supine to Sit: total assistance  · Transfers:     · Bed to Chair: total assistance with  no AD  using  Squat Pivot  · Balance: supported sitting (S)    AM-PAC 6 CLICK MOBILITY  Total Score:10       Therapeutic Activities and Exercises:  Pt educated on: PT role/POC; safety c mobility; benefits of OOB activities; performing therex; d/c recs - v/u      Patient left up in chair with all lines intact, call button in reach and RN notified.    GOALS:   Multidisciplinary Problems     Physical Therapy Goals        Problem: Physical Therapy Goal    Goal Priority Disciplines Outcome Goal Variances Interventions   Physical Therapy Goal   (Resolved)     PT, PT/OT Outcome(s) achieved     Description:  Pt does not require additional acute PT services at this time d/t baseline c functional mobility.     Pt educated on asking medical staff for PT consult if changes in functional status occurs. - v/u                    Multidisciplinary Problems (Resolved)        Problem: Physical Therapy Goal    Goal Priority Disciplines Outcome Goal Variances Interventions   Physical Therapy Goal   (Resolved)     PT, PT/OT Outcome(s) achieved     Description:  Pt does not require additional acute PT services at this time d/t baseline c functional mobility.    Pt educated on asking medical staff for  PT consult if changes in functional status occurs. - v/u                          History:     Past Medical History:   Diagnosis Date    Arthritis of right knee     Contracture of muscle, right upper arm 12/18/2018    Diabetes mellitus     Hyperlipidemia     Hypertension     Primary osteoarthritis of right knee     Pure hypercholesterolemia 10/19/2015    Weakness of both lower extremities 12/18/2018       Past Surgical History:   Procedure Laterality Date    none         Clinical Decision Making:     History  Co-morbidities and personal factors that may impact the plan of care Examination  Body Structures and Functions, activity limitations and participation restrictions that may impact the plan of care Clinical Presentation   Decision Making/ Complexity Score   Co-morbidities:   [] Time since onset of injury / illness / exacerbation  [] Status of current condition  []Patient's cognitive status and safety concerns    [] Multiple Medical Problems (see med hx)  Personal Factors:   [] Patient's age  [] Prior Level of function   [] Patient's home situation (environment and family support)  [] Patient's level of motivation  [] Expected progression of patient      HISTORY:(criteria)    [x] 88788 - no personal factors/history    [] 97259 - has 1-2 personal factor/comorbidity     [] 82887 - has >3 personal factor/comorbidity     Body Regions:  [] Objective examination findings  [] Head     []  Neck  [] Trunk   [] Upper Extremity  [] Lower Extremity    Body Systems:  [] For communication ability, affect, cognition, language, and learning style: the assessment of the ability to make needs known, consciousness, orientation (person, place, and time), expected emotional /behavioral responses, and learning preferences (eg, learning barriers, education  needs)  [] For the neuromuscular system: a general assessment of gross coordinated movement (eg, balance, gait, locomotion, transfers, and transitions) and motor  function  (motor control and motor learning)  [] For the musculoskeletal system: the assessment of gross symmetry, gross range of motion, gross strength, height, and weight  [] For the integumentary system: the assessment of pliability(texture), presence of scar formation, skin color, and skin integrity  [] For cardiovascular/pulmonary system: the assessment of heart rate, respiratory rate, blood pressure, and edema     Activity limitations:    [] Patient's cognitive status and saf ety concerns          [] Status of current condition      [] Weight bearing restriction  [] Cardiopulmunary Restriction    Participation Restrictions:   [] Goals and goal agreement with the patient     [] Rehab potential (prognosis) and probable outcome      Examination of Body System: (criteria)    [x] 81612 - addressing 1-2 elements    [] 90238 - addressing a total of 3 or more elements     [] 42914 -  Addressing a total of 4 or more elements         Clinical Presentation: (criteria)  Stable - 25375     On examination of body system using standardized tests and measures patient presents with 1-2 elements from any of the following: body structures and functions, activity limitations, and/or participation restrictions.  Leading to a clinical presentation that is considered stable and/or uncomplicated                              Clinical Decision Making  (Eval Complexity):  Low- 89869     Time Tracking:     PT Received On: 12/20/18  PT Start Time: 0928     PT Stop Time: 0944  PT Total Time (min): 16 min     Billable Minutes: Re-eval 16 min      Kasi Baker, PT  12/20/2018

## 2018-12-20 NOTE — ASSESSMENT & PLAN NOTE
- RUE splint in place     Recommendations  -  PT/OT evaluate and treat  -  Initiate stretching program  -  Use splints/casting/bracing to help preserve ROM    -  Functional electrical stimulation  -  Consider pharmaceutical management- (baclofen, diazepam, clonidine, tizanidine)  -  Follow up in PM&R clinic 2-4 weeks after discharge

## 2018-12-20 NOTE — ASSESSMENT & PLAN NOTE
"Gait disturbance  Localized edema of right hand  Contracture of muscle, right upper arm  Weakness of both lower extremities    - Condition unchanged since admit.   - Patient with progressive immobility and weak over past few months starting five months ago, worsening over past two months. Patient with flexion contracture of right hand, along with right hand edema and weakness as well as "tingling" pain in right elbow over the past few months that has recently worsened. Has seen orthopedics as outpatient with unremarkable Radiology.  - EMG/NCV ordered on 12/18 to further evaluate cause of RUE contractures and leg weakness as per Neurology recs but Neurology states can be done as outpatient as low .suspicion for motor neuron disease.  - PT/OT consulted and evaluated patient and following - OT recommending SNF and  working with family and patient on choices. IP Rehab consult placed also.   - MRI brain done showed "No evidence of acute intracranial pathology"  - US of RUE showed "No thrombus in central veins of the right upper extremity."  - MRI of cervical spine "Mild DJD of C-spine but no stenosis". Repeat MRI of thoracic and cervical spine on 12/19 with contrast with no stenosis or significant disc disease so no structural cause for right arm or leg weakness.   - CPK normal at 61  - Neurology following and appreciate recs.   - Pain medication PRN and topical capsaicin to right upper extremity. Pain related to contracture sand improved with placement of right arm splint.   - Edema to right hand and arm related to immobility and improved with splint.   "

## 2018-12-20 NOTE — PROGRESS NOTES
[facility]  Physical Medicine & Rehab  Progress Note    Patient Name: Deisi Cheung  MRN: [id number]  Admission Date: 12/14/2018  Length of Stay: 0 days  Attending Physician: Yodit Issa MD    Subjective:     Principal Problem:Immobility    Hospital Course:   12/15/18: Evaluated by PT. Sit to stand HHA- maxA. Sitting (I).  12/17/18: Evaluated by OT. Sit to stand totalA x 2ppl. UBD totalA. LBD dependence.   12/18/18: Participated w/ OT. R resting hand splint session, prolonged stretch and to prevent further contracture.     Interval History 12/20/2018:  Patient is seen for follow-up rehab evaluation and recommendations: Low level with therapy.     HPI, Past Medical, Family, and Social History remains the same as documented in the initial encounter.    Scheduled Medications:    amLODIPine  10 mg Oral Daily    atorvastatin  40 mg Oral Daily    carvedilol  25 mg Oral BID WM    cyanocobalamin  1,000 mcg Oral Daily    enoxaparin  40 mg Subcutaneous Daily    insulin aspart U-100  4 Units Subcutaneous TIDWM    insulin detemir U-100  6 Units Subcutaneous BID    lisinopril  40 mg Oral Daily    magnesium oxide  400 mg Oral Daily    tuberculin  5 Units Intradermal Once       Diagnostic Results: Labs: Reviewed    PRN Medications: acetaminophen, capsaicin, dextrose 50%, dextrose 50%, glucagon (human recombinant), glucose, glucose, hydrALAZINE, HYDROcodone-acetaminophen, HYDROcodone-acetaminophen, insulin aspart U-100, sodium chloride 0.9%    Review of Systems   Constitutional: Positive for fatigue. Negative for fever.   HENT: Negative for trouble swallowing and voice change.    Respiratory: Negative for cough and shortness of breath.    Cardiovascular: Negative for chest pain and leg swelling.   Gastrointestinal: Negative for abdominal distention and abdominal pain.   Musculoskeletal: Positive for gait problem.        Joint stiffness   Skin: Negative for color change.   Neurological: Positive  for weakness. Negative for light-headedness and headaches.   Psychiatric/Behavioral: Negative for agitation and confusion.     Objective:     Vital Signs (Most Recent):  Temp: 98.6 °F (37 °C) (12/20/18 1159)  Pulse: 73 (12/20/18 1159)  Resp: 18 (12/20/18 1159)  BP: 136/70 (12/20/18 1159)  SpO2: 96 % (12/20/18 1159)    Vital Signs (24h Range):  Temp:  [97.7 °F (36.5 °C)-99 °F (37.2 °C)] 98.6 °F (37 °C)  Pulse:  [65-81] 73  Resp:  [17-18] 18  SpO2:  [95 %-97 %] 96 %  BP: (136-176)/(70-81) 136/70     Physical Exam   Constitutional: She is oriented to person, place, and time. She appears well-developed.   HENT:   Head: Normocephalic and atraumatic.   Eyes: EOM are normal. Pupils are equal, round, and reactive to light.   Neck: Neck supple.   Pulmonary/Chest: No respiratory distress.   Abdominal: Normal appearance. She exhibits no distension.   Musculoskeletal:   RUE mild swelling, contracted and in sling 1/5, LUE 3/5  RLE, 1/5, LLE 1/5 (could not  off of bed)   Neurological: She is alert and oriented to person, place, and time.   Skin: Skin is warm and dry.   Psychiatric: She has a normal mood and affect. Her behavior is normal.   Vitals reviewed      Assessment/Plan:      * Immobility    - MRI displayed no evidence of acute intracranial pathology and R upper ext US displayed no thrombus.   - Neurology consulted and etiology unclear.   - EMG scheduled 12/19. If unrevealing recommendation to repeat MRI C/T spine  - consider MRI L spine as well 2/2 BLE weakness     Weakness of both lower extremities    - see immobility  -consider MRI L spine given presentation      Contracture of muscle, right upper arm    - RUE splint in place     Recommendations  -  PT/OT evaluate and treat  -  Initiate stretching program  -  Use splints/casting/bracing to help preserve ROM    -  Functional electrical stimulation  -  Consider pharmaceutical management- (baclofen, diazepam, clonidine, tizanidine)  -  Follow up in PM&R clinic 2-4  weeks after discharge     Vitamin B12 deficiency anemia    -controlled and stable     Gait disturbance    - Related to prolonged/acute hospital course.     Recommendations  -  Encourage mobility, OOB in chair at least 3 hours per day, and early ambulation as appropriate  -  PT/OT evaluate and treat  -  Pain management  -  Monitor for and prevent skin breakdown and pressure ulcers  · Early mobility, repositioning/weight shifting every 20-30 minutes when sitting, turn patient every 2 hours, proper mattress/overlay and chair cushioning, pressure relief/heel protector boots  -  DVT prophylaxis    -  Reviewed discharge options (IP rehab, SNF, HH therapy, and OP therapy)     Essential hypertension    - Hospital med following   - Norvasc, Lisnopril, and Coreg      Recommend Skilled Nursing. Consider MRI L spine given presentation. SNF per patient/family preference.        Alayna Abraham NP  Department of Physical Medicine & Rehab   [facility]

## 2018-12-20 NOTE — ASSESSMENT & PLAN NOTE
Improved control in the hospital in past 24 hours. Holding home meds of Sitagliptin and Metformin while patient in hospital and treating with insulin.     Blood Sugars (AccuCheck):    Recent Labs     12/18/18  2119 12/19/18  1201 12/19/18  1619 12/19/18  2116 12/20/18  0741 12/20/18  1317   POCTGLUCOSE 269* 212* 133* 162* 116* 188*       · Last HgA1C 6 % and at goal.  · Plan is to continue Levemir 6 units twice a day and Novolog 4 units with meals to treat in hospital.   · Plan is to continue to monitor POCT glucose 4 times a day with each meal and at bedtime and cover with Novolog low dose sliding scale insulin.   · Plan is to continue 1500 cat diabetic diet in the hospital.   · Target pre-meal glucose goal is <140 with all random glucoses <180 in non-critically ill patient.

## 2018-12-20 NOTE — PLAN OF CARE
Problem: Adult Inpatient Plan of Care  Goal: Plan of Care Review  Outcome: Ongoing (interventions implemented as appropriate)  Pt resting in bed, turned every two hours, medicated for pain x 1, pt incont. Of bowel and bladder, carolyn care provided, call light in reach, side rails up x 2 for safety, will cont to monitor pt.

## 2018-12-21 NOTE — PLAN OF CARE
Problem: Adult Inpatient Plan of Care  Goal: Plan of Care Review  Outcome: Ongoing (interventions implemented as appropriate)  Pt AAOX 4, free of fall. Pt c/o pain mostly on her right shoulder and elbow and hand still a little swollen as well, capcaisin oint seemed to help with the pain. Pt is being turned q2h, incontinent x 2. No other distress reported by pt.

## 2018-12-21 NOTE — PLAN OF CARE
Problem: Adult Inpatient Plan of Care  Goal: Patient-Specific Goal (Individualization)  Outcome: Ongoing (interventions implemented as appropriate)  Pt is aox4, complaining of 8/10 pain to RUE, capsaicin cream available, pt states that this helps take away a little pain, PRN tylenol also available for pain, R hand/wrist splint in place. Pt out of bed to chair max assist for several hours today. Blood sugar monitored and covered with meals. Safety precautions maintained.

## 2018-12-21 NOTE — PLAN OF CARE
[facility]     Department of Hospital Medicine     [address] [place] [address]     [phone] after hours  [phone] fax       NURSING HOME ORDERS    12/24/2018    Admit to Nursing Home:  Skilled Bed                                            Diagnoses:  Active Hospital Problems    Diagnosis  POA    *Immobility [Z74.09]  Yes     Priority: 1 - High    Essential hypertension [I10]  Yes     Priority: 2     Type 2 diabetes mellitus without complication, without long-term current use of insulin [E11.9]  Yes     Priority: 3     Vitamin B12 deficiency anemia [D51.9]  Yes     Priority: 4     Pure hypercholesterolemia [E78.00]  Yes     Priority: 5     Contracture of muscle, right upper arm [M62.421]  Yes    Weakness of both lower extremities [R29.898]  Yes    Gait disturbance [R26.9]  Yes    Localized edema [R60.0]  Yes      Resolved Hospital Problems   No resolved problems to display.       Patient is homebound due to:  Immobility    Allergies:  Review of patient's allergies indicates:   Allergen Reactions    Pcn [penicillins]        Vitals: Every shift (Skilled Nursing patients)        Code Status: Full Code     Diet: diabetic diet: 2000 calorie with Thin liquids     Supplement: Glucerna or equivalent, one can with meals    Activities:   - Up in a chair each morning as tolerated   - Ambulate with assistance to bathroom   - May use walker, cane, or self-propelled wheelchair  - Keep right arm splint in place to help with right hand contracture and swelling.     LABS:  Per facility protocol      Nursing Precautions:       - Fall precautions per nursing home protocol      CONSULTS:     Physical Therapy to evaluate and treat 5 times a week     Occupational Therapy to evaluate and treat 5 times a week    WOUND CARE ORDERS  n/a              DIABETES CARE:   SN to perform and educate Diabetic management with blood glucose monitoring:, Fingerstick blood sugar  before meals and at bedtime and Report CBG < 60 or > 350 to physician.                                          Insulin Sliding Scale          Glucose  Novolog Insulin Subcutaneous        0 - 60   Orange juice or glucose tablet, hold insulin      No insulin   201-250  2 units   251-300  4 units   301-350  6 units   351-400  8 units   >400   10 units then call physician      Medications:     Current Discharge Medication List      START taking these medications    Details   acetaminophen (TYLENOL) 325 MG tablet Take 2 tablets (650 mg total) by mouth every 4 (four) hours as needed (Mild 1-3/10 pain or temperature > 101 F).  Refills: 0      amLODIPine (NORVASC) 10 MG tablet Take 1 tablet (10 mg total) by mouth once daily.      capsaicin (ZOSTRIX) 0.025 % cream Apply topically 4 (four) times daily as needed (right arm pain).  Refills: 0      cyanocobalamin (VITAMIN B-12) 1000 MCG tablet Take 1 tablet (1,000 mcg total) by mouth once daily.      HYDROcodone-acetaminophen (NORCO) 5-325 mg per tablet Take 1 tablet by mouth every 4 (four) hours as needed (Moderate to severe pain).  Qty: 30 tablet, Refills: 0      insulin aspart U-100 (NOVOLOG) 100 unit/mL InPn pen Inject 0-5 Units into the skin before meals and at bedtime as needed (Hyperglycemia).  Refills: 0         CONTINUE these medications which have NOT CHANGED    Details   alendronate (FOSAMAX) 70 MG tablet Take 1 tablet (70 mg total) by mouth every 7 days.  Qty: 4 tablet, Refills: 11      aspirin (ECOTRIN) 81 MG EC tablet Take 81 mg by mouth once daily.      atorvastatin (LIPITOR) 40 MG tablet Take 40 mg by mouth once daily.      carvedilol (COREG) 25 MG tablet Take 1 tablet (25 mg total) by mouth 2 (two) times daily with meals.  Qty: 180 tablet, Refills: 3      lisinopril (PRINIVIL,ZESTRIL) 40 MG tablet Take 40 mg by mouth once daily.      magnesium oxide (MAG-OX) 400 mg (241.3 mg magnesium) tablet Take 400 mg by mouth once daily.      metformin (GLUCOPHAGE-XR)  500 MG 24 hr tablet Take 500 mg by mouth 2 (two) times daily with meals.      SITagliptin (JANUVIA) 50 MG Tab Take 100 mg by mouth once daily.              Follow-up:   Future Appointments   Date Time Provider Department Center   1/29/2019  9:00 AM Ivan Bajwa MD [facility] PB EPI [facility]   2/8/2019  1:30 PM [facility] EMG PROCEDURAL LAB [facility] NEURO [facility]   2/11/2019  2:20 PM Corwin Ortiz MD [facility]   2/26/2019 11:45 AM Virginia Aquino MD [facility] HAND [facility]       _________________________________  Yodit Issa MD  12/24/2018

## 2018-12-21 NOTE — PLAN OF CARE
Problem: Adult Inpatient Plan of Care  Goal: Plan of Care Review  Outcome: Ongoing (interventions implemented as appropriate)  Patient alert and oriented. Patient calm and cooperative. Patient denies any complaints of pain or discomfort at this time. Will continue to monitor.

## 2018-12-21 NOTE — PLAN OF CARE
SW following for DC needs. SW in communication with CM.    SW spoke to patient's daughter in law, Kayli, and updated her that [facility] has declined but [facility] are still following. CASTILLO asked Kayli for more choices for SNF for the patient. Kayli stated that SW can send to [facility]. CASTILLO sent to [facility] via [employer].     Maria Del Rosario Umanzor, WILSON  [facility]  [id number]

## 2018-12-22 NOTE — ASSESSMENT & PLAN NOTE
Controlled in past 24 hours. Holding home meds of Sitagliptin and Metformin while patient in hospital and treating with insulin.     Blood Sugars (AccuCheck):    Recent Labs     12/20/18  1652 12/20/18  2039 12/21/18  0925 12/21/18  1115 12/21/18  1312 12/21/18  1732   POCTGLUCOSE 118* 129* 200* 133* 99 215*       · Last HgA1C 6 % and at goal.  · Plan is to continue Levemir 6 units twice a day and Novolog 4 units with meals to treat in hospital.   · Plan is to continue to monitor POCT glucose 4 times a day with each meal and at bedtime and cover with Novolog low dose sliding scale insulin.   · Plan is to continue 1500 cat diabetic diet in the hospital.   · Target pre-meal glucose goal is <140 with all random glucoses <180 in non-critically ill patient.

## 2018-12-22 NOTE — SUBJECTIVE & OBJECTIVE
Interval History: Patient states right arm pain is improving with capsaicin cream. Patient awaiting NH SNF placement.     Review of Systems   Constitutional: Negative for fatigue and fever.   Respiratory: Negative for cough and shortness of breath.    Cardiovascular: Negative for chest pain, palpitations and leg swelling.   Gastrointestinal: Negative for abdominal pain, constipation, diarrhea and nausea.   Genitourinary: Negative for dysuria, frequency and hematuria.   Musculoskeletal: Positive for arthralgias. Negative for back pain and myalgias.   Skin: Negative for rash.   Neurological: Positive for weakness (RUE and both legs ). Negative for numbness and headaches.   Psychiatric/Behavioral: Negative for agitation and confusion.     Objective:     Vital Signs (Most Recent):  Temp: 98.8 °F (37.1 °C) (12/22/18 1200)  Pulse: 66 (12/22/18 1200)  Resp: 18 (12/22/18 1200)  BP: (!) 93/54 (12/22/18 1200)  SpO2: 97 % (12/22/18 1200) Vital Signs (24h Range):  Temp:  [98.2 °F (36.8 °C)-98.8 °F (37.1 °C)] 98.8 °F (37.1 °C)  Pulse:  [66-84] 66  Resp:  [18-20] 18  SpO2:  [95 %-98 %] 97 %  BP: ()/(54-85) 93/54     Weight: 50.9 kg (112 lb 3.4 oz)  Body mass index is 20.52 kg/m².    Intake/Output Summary (Last 24 hours) at 12/22/2018 1417  Last data filed at 12/22/2018 0606  Gross per 24 hour   Intake --   Output 700 ml   Net -700 ml      Physical Exam   Constitutional: She is oriented to person, place, and time. She appears well-developed. No distress.   Frail elderly lady   HENT:   Head: Normocephalic and atraumatic.   Mouth/Throat: No oropharyngeal exudate.   Eyes: No scleral icterus.   Neck: Neck supple.   Cardiovascular: Normal rate, regular rhythm and normal heart sounds. Exam reveals no gallop and no friction rub.   No murmur heard.  Pulmonary/Chest: Effort normal and breath sounds normal. No respiratory distress. She has no wheezes.   Abdominal: Soft. Bowel sounds are normal. She exhibits no distension. There is no  tenderness. There is no guarding.   Musculoskeletal: She exhibits edema and tenderness.   Right hand is TTP, swollen with contracture of R fingers and elbow, very limited active movement of R arm or hand, some passive ROM but limited by pain. Splint in place to right arm.      Neurological: She is alert and oriented to person, place, and time.   Skin: Skin is warm and dry. She is not diaphoretic.   Psychiatric: She has a normal mood and affect. Her behavior is normal. Thought content normal.   Nursing note and vitals reviewed.      Significant Labs:   CBC:   Recent Labs   Lab 12/21/18  0502 12/22/18  0458   WBC 7.53 7.01   HGB 10.4* 10.9*   HCT 34.3* 34.8*   * 330     CMP:   Recent Labs   Lab 12/21/18  0502 12/22/18  0458    137   K 4.2 3.6    103   CO2 24 24   GLU 84 124*   BUN 14 13   CREATININE 0.6 0.5   CALCIUM 9.0 9.2   ANIONGAP 9 10   EGFRNONAA >60.0 >60.0     POCT Glucose:   Recent Labs   Lab 12/21/18  2120 12/22/18  0836 12/22/18  1155   POCTGLUCOSE 200* 134* 147*       Significant Imaging: I have reviewed all pertinent imaging results/findings within the past 24 hours.

## 2018-12-22 NOTE — PROGRESS NOTES
"[facility] Hospital Medicine  Progress Note    Patient Name: Deisi Cheung  MRN: [id number]  Patient Class: OP- Observation   Admission Date: 12/14/2018  Length of Stay: 0 days  Attending Physician: Yodit Issa MD  Primary Care Provider: Santos Mena MD    [facility] Medicine Team: [facility] Yodit Issa MD    Subjective:     Principal Problem:Immobility    HPI:  84 YO lady with HTN, HLD, Type 2 diabetes on oral hypoglycemics and osteoarthritis who presented with complaint of presistant right arm weakness and right hand swelling for past 5 months. She denies injury or trauma. She has weakness of the entire RUE. She has difficulty with active shoulder, elbow, wrist, and finger motion. She has flexion contracture of the fingers. She notes edema of the hand which is improved with elevation. She is able to passively extend them to some degree however limited by pain.  She denies notable numbness or tingling. Confusion, trauma, swelling in any other extremities any rashes, fever or chills, neck pain,  hx of known CVA. She was evaluated by Orthopedics in past with  prior x-rays which are unremarkable. So she was sent to Neurology for further evaluation and recommended an EMG which has not been done yet. On presentation to ED, she had an MRI brain done that showed " No evidence of acute intracranial pathology.Moderate generalized cerebral volume loss and moderate chronic microvascular ischemic changes" and US of RUE that showed "No thrombus in central veins of the right upper extremity."   Of note pt has been bed bound and not ambulatory for months (no clear inciting events) she has worked with PT and OT in past for her knee pain but has progressively has become bed bound and relies on her family for her ADLs. Family reports that they can no longer meet all her need and are requesting admission for placement to nursing home. We will admit for safe dispo planning, CPK has been " ordered.    Hospital Course:  Mrs. Cheung was admitted for nursing home placement and Neurology evaluation as to cause of progressive immobility and loss o use of right arm and leg weakness. PT consulted. H/H at baseline with anemia. B12 low, started on supplement. Patient hypertensive in absence of pain and started on Norvasc 10 mg po daily to treat. Neurology consulted, MRI of C-spine ordered and returned with only mild DJD of cervical spine but no spinal stenosis and does not explain patient's neurologic symptoms. EMG and NCV ordered on 12/18 to further work-up cause of progressive weakness. PT/OT consulted, recommended SNF placement and  working with family on placement choices. Pain to right arm improved with placement of splint to right hand and forearm to help with contractures. CPK normal at 61. Repeat MRI of thoracic and cervical spine with contrast done on 12/19 as per Neurology recs to make sure no structural cause of neurologic symptoms and repeat MRI unremarkable for any significant disc disease or spinal stenosis to explain patient's neurologic symptoms. Neurology states NCV/EMG can be done as outpatient as low suspicion for motor neuron disease.  working on placement with family and referrals sent to multiple SNF facilities. Patient awaiting SNF placement for discharge.     Interval History: Patient states right arm pain is improving with capsaicin cream. Patient awaiting NH SNF placement.     Review of Systems   Constitutional: Negative for fatigue and fever.   Respiratory: Negative for cough and shortness of breath.    Cardiovascular: Negative for chest pain, palpitations and leg swelling.   Gastrointestinal: Negative for abdominal pain, constipation, diarrhea and nausea.   Genitourinary: Negative for dysuria, frequency and hematuria.   Musculoskeletal: Positive for arthralgias. Negative for back pain and myalgias.   Skin: Negative for rash.   Neurological: Positive for  weakness (RUE and both legs ). Negative for numbness and headaches.   Psychiatric/Behavioral: Negative for agitation and confusion.     Objective:     Vital Signs (Most Recent):  Temp: 98.8 °F (37.1 °C) (12/22/18 1200)  Pulse: 66 (12/22/18 1200)  Resp: 18 (12/22/18 1200)  BP: (!) 93/54 (12/22/18 1200)  SpO2: 97 % (12/22/18 1200) Vital Signs (24h Range):  Temp:  [98.2 °F (36.8 °C)-98.8 °F (37.1 °C)] 98.8 °F (37.1 °C)  Pulse:  [66-84] 66  Resp:  [18-20] 18  SpO2:  [95 %-98 %] 97 %  BP: ()/(54-85) 93/54     Weight: 50.9 kg (112 lb 3.4 oz)  Body mass index is 20.52 kg/m².    Intake/Output Summary (Last 24 hours) at 12/22/2018 1417  Last data filed at 12/22/2018 0606  Gross per 24 hour   Intake --   Output 700 ml   Net -700 ml      Physical Exam   Constitutional: She is oriented to person, place, and time. She appears well-developed. No distress.   Frail elderly lady   HENT:   Head: Normocephalic and atraumatic.   Mouth/Throat: No oropharyngeal exudate.   Eyes: No scleral icterus.   Neck: Neck supple.   Cardiovascular: Normal rate, regular rhythm and normal heart sounds. Exam reveals no gallop and no friction rub.   No murmur heard.  Pulmonary/Chest: Effort normal and breath sounds normal. No respiratory distress. She has no wheezes.   Abdominal: Soft. Bowel sounds are normal. She exhibits no distension. There is no tenderness. There is no guarding.   Musculoskeletal: She exhibits edema and tenderness.   Right hand is TTP, swollen with contracture of R fingers and elbow, very limited active movement of R arm or hand, some passive ROM but limited by pain. Splint in place to right arm.      Neurological: She is alert and oriented to person, place, and time.   Skin: Skin is warm and dry. She is not diaphoretic.   Psychiatric: She has a normal mood and affect. Her behavior is normal. Thought content normal.   Nursing note and vitals reviewed.      Significant Labs:   CBC:   Recent Labs   Lab 12/21/18  0502  "12/22/18  0458   WBC 7.53 7.01   HGB 10.4* 10.9*   HCT 34.3* 34.8*   * 330     CMP:   Recent Labs   Lab 12/21/18  0502 12/22/18  0458    137   K 4.2 3.6    103   CO2 24 24   GLU 84 124*   BUN 14 13   CREATININE 0.6 0.5   CALCIUM 9.0 9.2   ANIONGAP 9 10   EGFRNONAA >60.0 >60.0     POCT Glucose:   Recent Labs   Lab 12/21/18  2120 12/22/18  0836 12/22/18  1155   POCTGLUCOSE 200* 134* 147*       Significant Imaging: I have reviewed all pertinent imaging results/findings within the past 24 hours.    Assessment/Plan:      * Immobility    Gait disturbance  Localized edema of right hand  Contracture of muscle, right upper arm  Weakness of both lower extremities    - Condition unchanged since admit.   - Patient with progressive immobility and weak over past few months starting five months ago, worsening over past two months. Patient with flexion contracture of right hand, along with right hand edema and weakness as well as "tingling" pain in right elbow over the past few months that has recently worsened. Has seen orthopedics as outpatient with unremarkable Radiology.  - EMG/NCV ordered on 12/18 to further evaluate cause of RUE contractures and leg weakness as per Neurology recs but Neurology states can be done as outpatient as low suspicion for motor neuron disease.  - PT/OT consulted and evaluated patient and following - OT recommending SNF and  working with family and patient on choices. Patient not IP rehab candidate.   - MRI brain done showed "No evidence of acute intracranial pathology"  - US of RUE showed "No thrombus in central veins of the right upper extremity."  - MRI of cervical spine "Mild DJD of C-spine but no stenosis". Repeat MRI of thoracic and cervical spine on 12/19 with contrast with no stenosis or significant disc disease so no structural cause for right arm or leg weakness.   - CPK normal at 61  - Neurology following and appreciate recs and state no further inpatient " evaluation needed and EMG/NCV can be done as outpatient and has EMG/NCV ordered for 2/8/2019.   - Pain medication PRN and topical capsaicin to right upper extremity. Pain related to contracture sand improved with placement of right arm splint.   - Edema to right hand and arm related to immobility and improved with splint.     Patient awaiting NH SNF placement for discharge and medically ready for discharge. Referrals sent to [facility] SNF facilities by social work.      Essential hypertension    · Patient's blood pressure slightly hypotensive in past 24 hours on Norvasc and Coreg and Lisinopril.   · Goal for blood pressure is SBP < 140 and DBP < 90 as patient > or = 60 years of age and patient is diabetic based on JNC 8 guidelines.   · Plan to stop Norvasc 10 mg po daily ( new med started on this admit) on 12/22 as patient with SBP 93 this am but will continue home meds of Lisinopril 40 mg po daily (home med) and Coreg 25 mg po BID (home med) to treat.  · Plan is to monitor patient's blood pressure routinely while patient is hospitalized.      Type 2 diabetes mellitus without complication, without long-term current use of insulin    Controlled in past 24 hours. Holding home meds of Sitagliptin and Metformin while patient in hospital and treating with insulin.     Blood Sugars (AccuCheck):    Recent Labs     12/21/18  1312 12/21/18  1732 12/21/18  2030 12/21/18  2120 12/22/18  0836 12/22/18  1155   POCTGLUCOSE 99 215* 230* 200* 134* 147*       · Last HgA1C 6 % and at goal.  · Plan is to continue Levemir 6 units twice a day and Novolog 4 units with meals to treat in hospital but will discharge on Metformin and Sitagliptin on hospital discharge.  · Plan is to continue to monitor POCT glucose 4 times a day with each meal and at bedtime and cover with Novolog low dose sliding scale insulin.   · Plan is to continue 1500 cat diabetic diet in the hospital.   · Target pre-meal glucose goal is <140 with all random glucoses  "<180 in non-critically ill patient.     Vitamin B12 deficiency anemia    - Controlled and stable.   - 11/23: B12 175, Ferritin 90, Transferrin 185, Sat Fe: 13, Fe: 35, TIBC: 274  - Patient started on B12 supplement at Vitamin B12 1000 mcg po daily as outpatient in 11/2018 and repeat Vitamin B12 level on this admit 1062.   - Folate level normal.      Pure hypercholesterolemia    Controlled. Continue Lipitor 40 mg po daily to treat.      Localized edema    R hand edema and weakness, pain improved  Unclear exact etiology  MRI brain ana maria howed "No evidence of acute intracranial pathology"  US showed "No thrombus in central veins of the right upper extremity."  Pt was supposed to have outpt EMG done by neurology  PT and OT  Pain medication PRN  Topical capsaicin        VTE Risk Mitigation (From admission, onward)        Ordered     enoxaparin injection 40 mg  Daily      12/18/18 1631     Place SOHEILA hose  Until discontinued      12/14/18 1741     IP VTE HIGH RISK PATIENT  Once      12/14/18 1741              Yodit Issa MD  Department of Hospital Medicine   [facility]  "

## 2018-12-22 NOTE — ASSESSMENT & PLAN NOTE
"Gait disturbance  Localized edema of right hand  Contracture of muscle, right upper arm  Weakness of both lower extremities    - Condition unchanged since admit.   - Patient with progressive immobility and weak over past few months starting five months ago, worsening over past two months. Patient with flexion contracture of right hand, along with right hand edema and weakness as well as "tingling" pain in right elbow over the past few months that has recently worsened. Has seen orthopedics as outpatient with unremarkable Radiology.  - EMG/NCV ordered on 12/18 to further evaluate cause of RUE contractures and leg weakness as per Neurology recs but Neurology states can be done as outpatient as low suspicion for motor neuron disease.  - PT/OT consulted and evaluated patient and following - OT recommending SNF and  working with family and patient on choices. Patient not IP rehab candidate.   - MRI brain done showed "No evidence of acute intracranial pathology"  - US of RUE showed "No thrombus in central veins of the right upper extremity."  - MRI of cervical spine "Mild DJD of C-spine but no stenosis". Repeat MRI of thoracic and cervical spine on 12/19 with contrast with no stenosis or significant disc disease so no structural cause for right arm or leg weakness.   - CPK normal at 61  - Neurology following and appreciate recs and state no further inpatient evaluation needed and EMG/NCV can be done as outpatient and has EMG/NCV ordered for 2/8/2019.   - Pain medication PRN and topical capsaicin to right upper extremity. Pain related to contracture sand improved with placement of right arm splint.   - Edema to right hand and arm related to immobility and improved with splint.     Patient awaiting NH SNF placement for discharge and medically ready for discharge. Referrals sent to mutiple NH SNF facilities by social work.   "

## 2018-12-22 NOTE — ASSESSMENT & PLAN NOTE
Controlled in past 24 hours. Holding home meds of Sitagliptin and Metformin while patient in hospital and treating with insulin.     Blood Sugars (AccuCheck):    Recent Labs     12/21/18  1312 12/21/18  1732 12/21/18  2030 12/21/18  2120 12/22/18  0836 12/22/18  1155   POCTGLUCOSE 99 215* 230* 200* 134* 147*       · Last HgA1C 6 % and at goal.  · Plan is to continue Levemir 6 units twice a day and Novolog 4 units with meals to treat in hospital but will discharge on Metformin and Sitagliptin on hospital discharge.  · Plan is to continue to monitor POCT glucose 4 times a day with each meal and at bedtime and cover with Novolog low dose sliding scale insulin.   · Plan is to continue 1500 cat diabetic diet in the hospital.   · Target pre-meal glucose goal is <140 with all random glucoses <180 in non-critically ill patient.

## 2018-12-22 NOTE — PLAN OF CARE
Problem: Adult Inpatient Plan of Care  Goal: Plan of Care Review  Outcome: Ongoing (interventions implemented as appropriate)  Pt AAO x4, still c/o of pain on her shoulder and her elbow. Capcaisin cream had been helping tremendously, doesn't require any opioid pain meds. Purewick is utilized as pt incontinent. Noted pt has some blanchable erhythema on her sacrum. Pt Is turned on her side most of the night. Seems like the swelling on the right hand has been improving as well, splint in place on her right arm. No other acute distress reported at this time. Will continue to monitor

## 2018-12-22 NOTE — SUBJECTIVE & OBJECTIVE
Interval History: Patient with no change in right arm pain or contracture. Patient awaiting SNF placement and multiple referrals sent to various facilities. No further inpatient evaluation needed as per Neurology.     Review of Systems   Constitutional: Negative for fatigue and fever.   Respiratory: Negative for cough and shortness of breath.    Cardiovascular: Negative for chest pain, palpitations and leg swelling.   Gastrointestinal: Negative for abdominal pain, constipation, diarrhea and nausea.   Genitourinary: Negative for dysuria, frequency and hematuria.   Musculoskeletal: Positive for arthralgias. Negative for back pain and myalgias.   Skin: Negative for rash.   Neurological: Positive for weakness (RUE and both legs ). Negative for numbness and headaches.   Psychiatric/Behavioral: Negative for agitation and confusion.     Objective:     Vital Signs (Most Recent):  Temp: 98.6 °F (37 °C) (12/21/18 1626)  Pulse: 78 (12/21/18 1626)  Resp: 18 (12/21/18 1626)  BP: 119/70 (12/21/18 1626)  SpO2: 97 % (12/21/18 1626) Vital Signs (24h Range):  Temp:  [96.8 °F (36 °C)-98.6 °F (37 °C)] 98.6 °F (37 °C)  Pulse:  [62-98] 78  Resp:  [16-18] 18  SpO2:  [94 %-97 %] 97 %  BP: (117-160)/(66-72) 119/70     Weight: 50.9 kg (112 lb 3.4 oz)  Body mass index is 20.52 kg/m².  No intake or output data in the 24 hours ending 12/21/18 1849   Physical Exam   Constitutional: She is oriented to person, place, and time. She appears well-developed. No distress.   Frail elderly lady   HENT:   Head: Normocephalic and atraumatic.   Mouth/Throat: No oropharyngeal exudate.   Eyes: No scleral icterus.   Neck: Neck supple.   Cardiovascular: Normal rate, regular rhythm and normal heart sounds. Exam reveals no gallop and no friction rub.   No murmur heard.  Pulmonary/Chest: Effort normal and breath sounds normal. No respiratory distress. She has no wheezes.   Abdominal: Soft. Bowel sounds are normal. She exhibits no distension. There is no  tenderness. There is no guarding.   Musculoskeletal: She exhibits edema and tenderness.   Right hand is TTP, swollen with contracture of R fingers and elbow, very limited active movement of R arm or hand, some passive ROM but limited by pain. Splint in place to right arm.      Neurological: She is alert and oriented to person, place, and time.   Skin: Skin is warm and dry. She is not diaphoretic.   Psychiatric: She has a normal mood and affect. Her behavior is normal. Thought content normal.   Nursing note and vitals reviewed.      Significant Labs:   CBC:   Recent Labs   Lab 12/20/18  0418 12/21/18  0502   WBC 8.21 7.53   HGB 9.9* 10.4*   HCT 31.6* 34.3*   * 378*     CMP:   Recent Labs   Lab 12/20/18  0418 12/21/18  0502    136   K 3.9 4.2    103   CO2 26 24   * 84   BUN 14 14   CREATININE 0.6 0.6   CALCIUM 8.9 9.0   ANIONGAP 7* 9   EGFRNONAA >60.0 >60.0     POCT Glucose:   Recent Labs   Lab 12/21/18  1115 12/21/18  1312 12/21/18  1732   POCTGLUCOSE 133* 99 215*       Significant Imaging: I have reviewed all pertinent imaging results/findings within the past 24 hours.

## 2018-12-22 NOTE — ASSESSMENT & PLAN NOTE
· Patient's blood pressure slightly hypotensive in past 24 hours on Norvasc and Coreg and Lisinopril.   · Goal for blood pressure is SBP < 140 and DBP < 90 as patient > or = 60 years of age and patient is diabetic based on JNC 8 guidelines.   · Plan to stop Norvasc 10 mg po daily ( new med started on this admit) on 12/22 as patient with SBP 93 this am but will continue home meds of Lisinopril 40 mg po daily (home med) and Coreg 25 mg po BID (home med) to treat.  · Plan is to monitor patient's blood pressure routinely while patient is hospitalized.

## 2018-12-22 NOTE — ASSESSMENT & PLAN NOTE
"Gait disturbance  Localized edema of right hand  Contracture of muscle, right upper arm  Weakness of both lower extremities    - Condition unchanged since admit.   - Patient with progressive immobility and weak over past few months starting five months ago, worsening over past two months. Patient with flexion contracture of right hand, along with right hand edema and weakness as well as "tingling" pain in right elbow over the past few months that has recently worsened. Has seen orthopedics as outpatient with unremarkable Radiology.  - EMG/NCV ordered on 12/18 to further evaluate cause of RUE contractures and leg weakness as per Neurology recs but Neurology states can be done as outpatient as low suspicion for motor neuron disease.  - PT/OT consulted and evaluated patient and following - OT recommending SNF and  working with family and patient on choices. Patient not IP rehab candidate.   - MRI brain done showed "No evidence of acute intracranial pathology"  - US of RUE showed "No thrombus in central veins of the right upper extremity."  - MRI of cervical spine "Mild DJD of C-spine but no stenosis". Repeat MRI of thoracic and cervical spine on 12/19 with contrast with no stenosis or significant disc disease so no structural cause for right arm or leg weakness.   - CPK normal at 61  - Neurology following and appreciate recs and state no further inpatient evaluation needed and EMG/NCV can be done as outpatient.   - Pain medication PRN and topical capsaicin to right upper extremity. Pain related to contracture sand improved with placement of right arm splint.   - Edema to right hand and arm related to immobility and improved with splint.   "

## 2018-12-22 NOTE — PROGRESS NOTES
"[facility] Hospital Medicine  Progress Note    Patient Name: Deisi Cheung  MRN: [id number]  Patient Class: OP- Observation   Admission Date: 12/14/2018  Length of Stay: 0 days  Attending Physician: Yodit Issa MD  Primary Care Provider: Santos Mena MD    [facility] Medicine Team: [facility] HOSP MED Z Yodit Issa MD    Subjective:     Principal Problem:Immobility    HPI:  82 YO lady with HTN, HLD, Type 2 diabetes on oral hypoglycemics and osteoarthritis who presented with complaint of presistant right arm weakness and right hand swelling for past 5 months. She denies injury or trauma. She has weakness of the entire RUE. She has difficulty with active shoulder, elbow, wrist, and finger motion. She has flexion contracture of the fingers. She notes edema of the hand which is improved with elevation. She is able to passively extend them to some degree however limited by pain.  She denies notable numbness or tingling. Confusion, trauma, swelling in any other extremities any rashes, fever or chills, neck pain,  hx of known CVA. She was evaluated by Orthopedics in past with  prior x-rays which are unremarkable. So she was sent to Neurology for further evaluation and recommended an EMG which has not been done yet. On presentation to ED, she had an MRI brain done that showed " No evidence of acute intracranial pathology.Moderate generalized cerebral volume loss and moderate chronic microvascular ischemic changes" and US of RUE that showed "No thrombus in central veins of the right upper extremity."   Of note pt has been bed bound and not ambulatory for months (no clear inciting events) she has worked with PT and OT in past for her knee pain but has progressively has become bed bound and relies on her family for her ADLs. Family reports that they can no longer meet all her need and are requesting admission for placement to nursing home. We will admit for safe dispo planning, CPK has been " ordered.    Hospital Course:  Mrs. Cheung was admitted for nursing home placement and Neurology evaluation as to cause of progressive immobility and loss o use of right arm and leg weakness. PT consulted. H/H at baseline with anemia. B12 low, started on supplement. Patient hypertensive in absence of pain and started on Norvasc 10 mg po daily to treat. Neurology consulted, MRI of C-spine ordered and returned with only mild DJD of cervical spine but no spinal stenosis and does not explain patient's neurologic symptoms. EMG and NCV ordered on 12/18 to further work-up cause of progressive weakness. PT/OT consulted, recommended SNF placement and  working with family on placement choices. Pain to right arm improved with placement of splint to right hand and forearm to help with contractures. CPK normal at 61. Repeat MRI of thoracic and cervical spine with contrast done on 12/19 as per Neurology recs to make sure no structural cause of neurologic symptoms and repeat MRI unremarkable for any significant disc disease or spinal stenosis to explain patient's neurologic symptoms. Neurology states NCV/EMG can be done as outpatient as low suspicion for motor neuron disease.  working on placement with family and referrals sent to multiple SNF facilities.     Interval History: Patient with no change in right arm pain or contracture. Patient awaiting SNF placement and multiple referrals sent to various facilities. No further inpatient evaluation needed as per Neurology.     Review of Systems   Constitutional: Negative for fatigue and fever.   Respiratory: Negative for cough and shortness of breath.    Cardiovascular: Negative for chest pain, palpitations and leg swelling.   Gastrointestinal: Negative for abdominal pain, constipation, diarrhea and nausea.   Genitourinary: Negative for dysuria, frequency and hematuria.   Musculoskeletal: Positive for arthralgias. Negative for back pain and myalgias.   Skin:  Negative for rash.   Neurological: Positive for weakness (RUE and both legs ). Negative for numbness and headaches.   Psychiatric/Behavioral: Negative for agitation and confusion.     Objective:     Vital Signs (Most Recent):  Temp: 98.6 °F (37 °C) (12/21/18 1626)  Pulse: 78 (12/21/18 1626)  Resp: 18 (12/21/18 1626)  BP: 119/70 (12/21/18 1626)  SpO2: 97 % (12/21/18 1626) Vital Signs (24h Range):  Temp:  [96.8 °F (36 °C)-98.6 °F (37 °C)] 98.6 °F (37 °C)  Pulse:  [62-98] 78  Resp:  [16-18] 18  SpO2:  [94 %-97 %] 97 %  BP: (117-160)/(66-72) 119/70     Weight: 50.9 kg (112 lb 3.4 oz)  Body mass index is 20.52 kg/m².  No intake or output data in the 24 hours ending 12/21/18 1849   Physical Exam   Constitutional: She is oriented to person, place, and time. She appears well-developed. No distress.   Frail elderly lady   HENT:   Head: Normocephalic and atraumatic.   Mouth/Throat: No oropharyngeal exudate.   Eyes: No scleral icterus.   Neck: Neck supple.   Cardiovascular: Normal rate, regular rhythm and normal heart sounds. Exam reveals no gallop and no friction rub.   No murmur heard.  Pulmonary/Chest: Effort normal and breath sounds normal. No respiratory distress. She has no wheezes.   Abdominal: Soft. Bowel sounds are normal. She exhibits no distension. There is no tenderness. There is no guarding.   Musculoskeletal: She exhibits edema and tenderness.   Right hand is TTP, swollen with contracture of R fingers and elbow, very limited active movement of R arm or hand, some passive ROM but limited by pain. Splint in place to right arm.      Neurological: She is alert and oriented to person, place, and time.   Skin: Skin is warm and dry. She is not diaphoretic.   Psychiatric: She has a normal mood and affect. Her behavior is normal. Thought content normal.   Nursing note and vitals reviewed.      Significant Labs:   CBC:   Recent Labs   Lab 12/20/18  0418 12/21/18  0502   WBC 8.21 7.53   HGB 9.9* 10.4*   HCT 31.6* 34.3*  "  * 378*     CMP:   Recent Labs   Lab 12/20/18  0418 12/21/18  0502    136   K 3.9 4.2    103   CO2 26 24   * 84   BUN 14 14   CREATININE 0.6 0.6   CALCIUM 8.9 9.0   ANIONGAP 7* 9   EGFRNONAA >60.0 >60.0     POCT Glucose:   Recent Labs   Lab 12/21/18  1115 12/21/18  1312 12/21/18  1732   POCTGLUCOSE 133* 99 215*       Significant Imaging: I have reviewed all pertinent imaging results/findings within the past 24 hours.    Assessment/Plan:      * Immobility    Gait disturbance  Localized edema of right hand  Contracture of muscle, right upper arm  Weakness of both lower extremities    - Condition unchanged since admit.   - Patient with progressive immobility and weak over past few months starting five months ago, worsening over past two months. Patient with flexion contracture of right hand, along with right hand edema and weakness as well as "tingling" pain in right elbow over the past few months that has recently worsened. Has seen orthopedics as outpatient with unremarkable Radiology.  - EMG/NCV ordered on 12/18 to further evaluate cause of RUE contractures and leg weakness as per Neurology recs but Neurology states can be done as outpatient as low suspicion for motor neuron disease.  - PT/OT consulted and evaluated patient and following - OT recommending SNF and  working with family and patient on choices. Patient not IP rehab candidate.   - MRI brain done showed "No evidence of acute intracranial pathology"  - US of RUE showed "No thrombus in central veins of the right upper extremity."  - MRI of cervical spine "Mild DJD of C-spine but no stenosis". Repeat MRI of thoracic and cervical spine on 12/19 with contrast with no stenosis or significant disc disease so no structural cause for right arm or leg weakness.   - CPK normal at 61  - Neurology following and appreciate recs and state no further inpatient evaluation needed and EMG/NCV can be done as outpatient.   - Pain " medication PRN and topical capsaicin to right upper extremity. Pain related to contracture sand improved with placement of right arm splint.   - Edema to right hand and arm related to immobility and improved with splint.      Essential hypertension    · Patient's blood pressure is controlled here in the hospital over past 24 hours.   · Goal for blood pressure is SBP < 140 and DBP < 90 as patient > or = 60 years of age and patient is diabetic based on JNC 8 guidelines.   · Continue current treatment regimen of Norvasc 10 mg po daily ( new med started on this admit) + Lisinopril 40 mg po daily (home med) and Coreg 25 mg po BID (home med).  · Plan is to monitor patient's blood pressure routinely while patient is hospitalized.      Type 2 diabetes mellitus without complication, without long-term current use of insulin    Controlled in past 24 hours. Holding home meds of Sitagliptin and Metformin while patient in hospital and treating with insulin.     Blood Sugars (AccuCheck):    Recent Labs     12/20/18  1652 12/20/18  2039 12/21/18  0925 12/21/18  1115 12/21/18  1312 12/21/18  1732   POCTGLUCOSE 118* 129* 200* 133* 99 215*       · Last HgA1C 6 % and at goal.  · Plan is to continue Levemir 6 units twice a day and Novolog 4 units with meals to treat in hospital.   · Plan is to continue to monitor POCT glucose 4 times a day with each meal and at bedtime and cover with Novolog low dose sliding scale insulin.   · Plan is to continue 1500 cat diabetic diet in the hospital.   · Target pre-meal glucose goal is <140 with all random glucoses <180 in non-critically ill patient.     Vitamin B12 deficiency anemia    - Controlled and stable.   - 11/23: B12 175, Ferritin 90, Transferrin 185, Sat Fe: 13, Fe: 35, TIBC: 274  - Patient started on B12 supplement at Vitamin B12 1000 mcg po daily as outpatient in 11/2018 and repeat Vitamin B12 level on this admit 1062.   - Folate level normal.      Pure hypercholesterolemia    Controlled.  "Continue Lipitor 40 mg po daily to treat.      Localized edema    R hand edema and weakness, pain improved  Unclear exact etiology  MRI brain ana maria howed "No evidence of acute intracranial pathology"  US showed "No thrombus in central veins of the right upper extremity."  Pt was supposed to have outpt EMG done by neurology  PT and OT  Pain medication PRN  Topical capsaicin        VTE Risk Mitigation (From admission, onward)        Ordered     enoxaparin injection 40 mg  Daily      12/18/18 1631     Place SOHEILA hose  Until discontinued      12/14/18 1741     IP VTE HIGH RISK PATIENT  Once      12/14/18 1741              Yodit Issa MD  Department of Hospital Medicine   [facility]  "

## 2018-12-23 NOTE — ASSESSMENT & PLAN NOTE
Controlled in past 24 hours. Holding home meds of Sitagliptin and Metformin while patient in hospital and treating with insulin.     Blood Sugars (AccuCheck):    Recent Labs     12/22/18  0836 12/22/18  1155 12/22/18  1744 12/22/18  2128 12/23/18  0844 12/23/18  1250   POCTGLUCOSE 134* 147* 116* 202* 147* 259*       · Last HgA1C 6 % and at goal.  · Plan is to continue Levemir 6 units twice a day and Novolog 4 units with meals to treat in hospital but will discharge on Metformin and Sitagliptin on hospital discharge.  · Plan is to continue to monitor POCT glucose 4 times a day with each meal and at bedtime and cover with Novolog low dose sliding scale insulin.   · Plan is to continue 1500 cat diabetic diet in the hospital.   · Target pre-meal glucose goal is <140 with all random glucoses <180 in non-critically ill patient.

## 2018-12-23 NOTE — SUBJECTIVE & OBJECTIVE
Interval History: Patient reports right arm pain controlled. 2 of patient's daughter sat bedside and brought up to date on patient care and plan for discharge.     Review of Systems   Constitutional: Negative for fever.   Respiratory: Negative for cough and shortness of breath.    Cardiovascular: Negative for chest pain and leg swelling.   Gastrointestinal: Negative for abdominal pain, constipation, diarrhea and nausea.   Genitourinary: Negative for dysuria, frequency and hematuria.   Musculoskeletal: Positive for arthralgias and myalgias (Right arm).   Skin: Negative for rash.   Neurological: Positive for weakness (RUE and both legs ). Negative for numbness and headaches.   Psychiatric/Behavioral: Negative for agitation and confusion.     Objective:     Vital Signs (Most Recent):  Temp: 97.9 °F (36.6 °C) (12/23/18 1256)  Pulse: 77 (12/23/18 1256)  Resp: 14 (12/23/18 1256)  BP: (!) 144/72 (12/23/18 1256)  SpO2: 99 % (12/23/18 1256) Vital Signs (24h Range):  Temp:  [97.5 °F (36.4 °C)-98.7 °F (37.1 °C)] 97.9 °F (36.6 °C)  Pulse:  [77-83] 77  Resp:  [14-18] 14  SpO2:  [97 %-99 %] 99 %  BP: (144-165)/(72-82) 144/72     Weight: 50.9 kg (112 lb 3.4 oz)  Body mass index is 20.52 kg/m².    Intake/Output Summary (Last 24 hours) at 12/23/2018 1348  Last data filed at 12/23/2018 0900  Gross per 24 hour   Intake 740 ml   Output 1600 ml   Net -860 ml      Physical Exam   Constitutional: She is oriented to person, place, and time. She appears well-developed. No distress.   Frail elderly lady   HENT:   Head: Normocephalic and atraumatic.   Mouth/Throat: No oropharyngeal exudate.   Eyes: No scleral icterus.   Neck: Neck supple.   Cardiovascular: Normal rate, regular rhythm and normal heart sounds. Exam reveals no gallop and no friction rub.   No murmur heard.  Pulmonary/Chest: Effort normal and breath sounds normal. No respiratory distress. She has no wheezes.   Abdominal: Soft. Bowel sounds are normal. She exhibits no distension.  There is no tenderness. There is no guarding.   Musculoskeletal: She exhibits edema and tenderness.   Right hand is TTP, swollen with contracture of R fingers and elbow, very limited active movement of R arm or hand, some passive ROM but limited by pain. Splint in place to right arm.      Neurological: She is alert and oriented to person, place, and time.   Skin: Skin is warm and dry. She is not diaphoretic.   Psychiatric: She has a normal mood and affect. Her behavior is normal. Thought content normal.   Nursing note and vitals reviewed.      Significant Labs:   POCT Glucose:   Recent Labs   Lab 12/22/18  2128 12/23/18  0844 12/23/18  1250   POCTGLUCOSE 202* 147* 259*       Significant Imaging: I have reviewed all pertinent imaging results/findings within the past 24 hours.

## 2018-12-23 NOTE — PROGRESS NOTES
"[facility] Hospital Medicine  Progress Note    Patient Name: Deisi Cheung  MRN: [id number]  Patient Class: OP- Observation   Admission Date: 12/14/2018  Length of Stay: 0 days  Attending Physician: Yodit Issa MD  Primary Care Provider: Santos Mena MD    [facility] Medicine Team: [facility] Yodit Issa MD    Subjective:     Principal Problem:Immobility    HPI:  82 YO lady with HTN, HLD, Type 2 diabetes on oral hypoglycemics and osteoarthritis who presented with complaint of presistant right arm weakness and right hand swelling for past 5 months. She denies injury or trauma. She has weakness of the entire RUE. She has difficulty with active shoulder, elbow, wrist, and finger motion. She has flexion contracture of the fingers. She notes edema of the hand which is improved with elevation. She is able to passively extend them to some degree however limited by pain.  She denies notable numbness or tingling. Confusion, trauma, swelling in any other extremities any rashes, fever or chills, neck pain,  hx of known CVA. She was evaluated by Orthopedics in past with  prior x-rays which are unremarkable. So she was sent to Neurology for further evaluation and recommended an EMG which has not been done yet. On presentation to ED, she had an MRI brain done that showed " No evidence of acute intracranial pathology.Moderate generalized cerebral volume loss and moderate chronic microvascular ischemic changes" and US of RUE that showed "No thrombus in central veins of the right upper extremity."   Of note pt has been bed bound and not ambulatory for months (no clear inciting events) she has worked with PT and OT in past for her knee pain but has progressively has become bed bound and relies on her family for her ADLs. Family reports that they can no longer meet all her need and are requesting admission for placement to nursing home. We will admit for safe dispo planning, CPK has been " ordered.    Hospital Course:  Mrs. Cheung was admitted for nursing home placement and Neurology evaluation as to cause of progressive immobility and loss o use of right arm and leg weakness. PT consulted. H/H at baseline with anemia. B12 low, started on supplement. Patient hypertensive in absence of pain and started on Norvasc 10 mg po daily to treat. Neurology consulted, MRI of C-spine ordered and returned with only mild DJD of cervical spine but no spinal stenosis and does not explain patient's neurologic symptoms. EMG and NCV ordered on 12/18 to further work-up cause of progressive weakness. PT/OT consulted, recommended SNF placement and  working with family on placement choices. Pain to right arm improved with placement of splint to right hand and forearm to help with contractures. CPK normal at 61. Repeat MRI of thoracic and cervical spine with contrast done on 12/19 as per Neurology recs to make sure no structural cause of neurologic symptoms and repeat MRI unremarkable for any significant disc disease or spinal stenosis to explain patient's neurologic symptoms. Neurology states NCV/EMG can be done as outpatient as low suspicion for motor neuron disease.  working on placement with family and referrals sent to multiple SNF facilities. Patient awaiting SNF placement for discharge.     Interval History: Patient reports right arm pain controlled. 2 of patient's daughter sat bedside and brought up to date on patient care and plan for discharge.     Review of Systems   Constitutional: Negative for fever.   Respiratory: Negative for cough and shortness of breath.    Cardiovascular: Negative for chest pain and leg swelling.   Gastrointestinal: Negative for abdominal pain, constipation, diarrhea and nausea.   Genitourinary: Negative for dysuria, frequency and hematuria.   Musculoskeletal: Positive for arthralgias and myalgias (Right arm).   Skin: Negative for rash.   Neurological: Positive for  weakness (RUE and both legs ). Negative for numbness and headaches.   Psychiatric/Behavioral: Negative for agitation and confusion.     Objective:     Vital Signs (Most Recent):  Temp: 97.9 °F (36.6 °C) (12/23/18 1256)  Pulse: 77 (12/23/18 1256)  Resp: 14 (12/23/18 1256)  BP: (!) 144/72 (12/23/18 1256)  SpO2: 99 % (12/23/18 1256) Vital Signs (24h Range):  Temp:  [97.5 °F (36.4 °C)-98.7 °F (37.1 °C)] 97.9 °F (36.6 °C)  Pulse:  [77-83] 77  Resp:  [14-18] 14  SpO2:  [97 %-99 %] 99 %  BP: (144-165)/(72-82) 144/72     Weight: 50.9 kg (112 lb 3.4 oz)  Body mass index is 20.52 kg/m².    Intake/Output Summary (Last 24 hours) at 12/23/2018 1348  Last data filed at 12/23/2018 0900  Gross per 24 hour   Intake 740 ml   Output 1600 ml   Net -860 ml      Physical Exam   Constitutional: She is oriented to person, place, and time. She appears well-developed. No distress.   Frail elderly lady   HENT:   Head: Normocephalic and atraumatic.   Mouth/Throat: No oropharyngeal exudate.   Eyes: No scleral icterus.   Neck: Neck supple.   Cardiovascular: Normal rate, regular rhythm and normal heart sounds. Exam reveals no gallop and no friction rub.   No murmur heard.  Pulmonary/Chest: Effort normal and breath sounds normal. No respiratory distress. She has no wheezes.   Abdominal: Soft. Bowel sounds are normal. She exhibits no distension. There is no tenderness. There is no guarding.   Musculoskeletal: She exhibits edema and tenderness.   Right hand is TTP, swollen with contracture of R fingers and elbow, very limited active movement of R arm or hand, some passive ROM but limited by pain. Splint in place to right arm.      Neurological: She is alert and oriented to person, place, and time.   Skin: Skin is warm and dry. She is not diaphoretic.   Psychiatric: She has a normal mood and affect. Her behavior is normal. Thought content normal.   Nursing note and vitals reviewed.      Significant Labs:   POCT Glucose:   Recent Labs   Lab  "12/22/18 2128 12/23/18  0844 12/23/18  1250   POCTGLUCOSE 202* 147* 259*       Significant Imaging: I have reviewed all pertinent imaging results/findings within the past 24 hours.    Assessment/Plan:      * Immobility    Gait disturbance  Localized edema of right hand  Contracture of muscle, right upper arm  Weakness of both lower extremities    - Condition unchanged since admit.   - Patient with progressive immobility and weak over past few months starting five months ago, worsening over past two months. Patient with flexion contracture of right hand, along with right hand edema and weakness as well as "tingling" pain in right elbow over the past few months that has recently worsened. Has seen orthopedics as outpatient with unremarkable Radiology.  - EMG/NCV ordered on 12/18 to further evaluate cause of RUE contractures and leg weakness as per Neurology recs but Neurology states can be done as outpatient as low suspicion for motor neuron disease.  - PT/OT consulted and evaluated patient and following - OT recommending SNF and  working with family and patient on choices. Patient not IP rehab candidate.   - MRI brain done showed "No evidence of acute intracranial pathology"  - US of RUE showed "No thrombus in central veins of the right upper extremity."  - MRI of cervical spine "Mild DJD of C-spine but no stenosis". Repeat MRI of thoracic and cervical spine on 12/19 with contrast with no stenosis or significant disc disease so no structural cause for right arm or leg weakness.   - CPK normal at 61  - Neurology following and appreciate recs and state no further inpatient evaluation needed and EMG/NCV can be done as outpatient and has EMG/NCV ordered for 2/8/2019.   - Pain medication PRN and topical capsaicin to right upper extremity. Pain related to contracture sand improved with placement of right arm splint.   - Edema to right hand and arm related to immobility and improved with splint.     Patient " awaiting NH SNF placement for discharge and medically ready for discharge. Referrals sent to [facility] facilities by social work.      Essential hypertension    · Patient's blood pressure controlled.    · Goal for blood pressure is SBP < 140 and DBP < 90 as patient > or = 60 years of age and patient is diabetic based on JNC 8 guidelines.   · Norvasc 10 mg po daily stopped ( new med started on this admit for HTN) on 12/22 as patient with SBP 93. Patient continue home meds of Lisinopril 40 mg po daily (home med) and Coreg 25 mg po BID (home med) to treat.  · Plan is to monitor patient's blood pressure routinely while patient is hospitalized.      Type 2 diabetes mellitus without complication, without long-term current use of insulin    Controlled in past 24 hours. Holding home meds of Sitagliptin and Metformin while patient in hospital and treating with insulin.     Blood Sugars (AccuCheck):    Recent Labs     12/22/18  0836 12/22/18  1155 12/22/18  1744 12/22/18  2128 12/23/18  0844 12/23/18  1250   POCTGLUCOSE 134* 147* 116* 202* 147* 259*       · Last HgA1C 6 % and at goal.  · Plan is to continue Levemir 6 units twice a day and Novolog 4 units with meals to treat in hospital but will discharge on Metformin and Sitagliptin on hospital discharge.  · Plan is to continue to monitor POCT glucose 4 times a day with each meal and at bedtime and cover with Novolog low dose sliding scale insulin.   · Plan is to continue 1500 cat diabetic diet in the hospital.   · Target pre-meal glucose goal is <140 with all random glucoses <180 in non-critically ill patient.     Vitamin B12 deficiency anemia    - Controlled and stable.   - 11/23: B12 175, Ferritin 90, Transferrin 185, Sat Fe: 13, Fe: 35, TIBC: 274  - Patient started on B12 supplement at Vitamin B12 1000 mcg po daily as outpatient in 11/2018 and repeat Vitamin B12 level on this admit 1062.   - Folate level normal.      Pure hypercholesterolemia    Controlled. Continue  "Lipitor 40 mg po daily to treat.      Localized edema    R hand edema and weakness, pain improved  Unclear exact etiology  MRI brain ana maria howed "No evidence of acute intracranial pathology"  US showed "No thrombus in central veins of the right upper extremity."  Pt was supposed to have outpt EMG done by neurology  PT and OT  Pain medication PRN  Topical capsaicin        VTE Risk Mitigation (From admission, onward)        Ordered     enoxaparin injection 40 mg  Daily      12/18/18 1631     Place SOHEILA hose  Until discontinued      12/14/18 1741     IP VTE HIGH RISK PATIENT  Once      12/14/18 1741              Yodit Issa MD  Department of Hospital Medicine   [facility]  "

## 2018-12-23 NOTE — PLAN OF CARE
Problem: Adult Inpatient Plan of Care  Goal: Plan of Care Review  Outcome: Ongoing (interventions implemented as appropriate)  No signs of distress noted this shift.  Tolerated Purewick well this shift.  Treated pain with cream.  Turned patient per her request.  No further complaints this shift.

## 2018-12-23 NOTE — PLAN OF CARE
Problem: Adult Inpatient Plan of Care  Goal: Plan of Care Review  Outcome: Ongoing (interventions implemented as appropriate)  Pt in bed resting a/o x4, contracted to exts, blanchable erythema to sacrum noted, pt has been on q2hr turn schedule, assisted to chair per pt request. BG not requiring SSI this shift, received insulin with meals. PIV c/d/i flushed/capped. Purewick in place to LWS, pt voided 800cc this shift. Cream applied to R arm for pain per PRN scale. VSS as charted per f/s, norvasc d/c per provider. No other changes noted. Report given to nightshift RN.

## 2018-12-23 NOTE — ASSESSMENT & PLAN NOTE
· Patient's blood pressure controlled.    · Goal for blood pressure is SBP < 140 and DBP < 90 as patient > or = 60 years of age and patient is diabetic based on JNC 8 guidelines.   · Norvasc 10 mg po daily stopped ( new med started on this admit for HTN) on 12/22 as patient with SBP 93. Patient continue home meds of Lisinopril 40 mg po daily (home med) and Coreg 25 mg po BID (home med) to treat.  · Plan is to monitor patient's blood pressure routinely while patient is hospitalized.

## 2018-12-24 NOTE — ASSESSMENT & PLAN NOTE
Controlled in past 24 hours. Holding home meds of Sitagliptin and Metformin while patient in hospital and treating with insulin but plan to resume on discharge to NH SNF.     Blood Sugars (AccuCheck):    Recent Labs     12/23/18  0844 12/23/18  1250 12/23/18  1707 12/23/18  2133 12/24/18  0845 12/24/18  1247   POCTGLUCOSE 147* 259* 172* 133* 106 122*       · Last HgA1C 6 % and at goal.  · Plan is to continue Levemir 6 units twice a day and Novolog 4 units with meals to treat in hospital but will discharge on Metformin and Sitagliptin on hospital discharge.  · Plan is to continue to monitor POCT glucose 4 times a day with each meal and at bedtime and cover with Novolog low dose sliding scale insulin.   · Plan is to continue 1500 cat diabetic diet in the hospital.   · Target pre-meal glucose goal is <140 with all random glucoses <180 in non-critically ill patient.

## 2018-12-24 NOTE — ASSESSMENT & PLAN NOTE
· Patient's blood pressure controlled.    · Goal for blood pressure is SBP < 140 and DBP < 90 as patient > or = 60 years of age and patient is diabetic based on JNC 8 guidelines.   · Norvasc 10 mg po daily stopped ( new med started on this admit for HTN) on 12/22 as patient with SBP 93. Patient continued on home meds of Lisinopril 40 mg po daily (home med) and Coreg 25 mg po BID (home med) to treat and BP has been stable on this regimen after Norvasc stopped on 12/22 with no further hypotension. Patient to be discharge to NH on this regimen.  · Plan is to monitor patient's blood pressure routinely while patient is hospitalized.

## 2018-12-24 NOTE — PT/OT/SLP PROGRESS
Occupational Therapy   Treatment    Name: Deisi Cheung  MRN: [id number]  Admitting Diagnosis:  Immobility       Recommendations:     Discharge Recommendations: (NH SNF)  Discharge Equipment Recommendations:  none  Barriers to discharge:  Decreased caregiver support    Subjective     Pain/Comfort:  · Pain Rating 1: (mild pain in RUE ( did not rate))  · Pain Addressed 1: Reposition, Distraction  · Pain Rating Post-Intervention 1: (mild pain did not rate)    Objective:     Communicated with: RN prior to session.  Patient found with all lines intact, call button in reach and RN notified and telemetry, Pa(RUE resting hand splint) upon OT entry to room.    General Precautions: Standard, fall   Orthopedic Precautions:N/A   Braces: (RUE splint)     Occupational Performance:    Bed Mobility:    · Patient completed Rolling/Turning to Right with total assistance  · Patient completed Supine to Sit with total assistance  · Patient completed Sit to Supine with total assistance     Sitting EOB:   Pt sat EOB ~15 minutes with max- TA for postural control; pt demo's posterior lean ( mostly 2/2 tight hip flexors)    Functional Mobility/Transfers:  · Unable to perform 2/2 significant tightness in hips and hamstrings ( unable to get feet placed on floor)    Activities of Daily Living:  · Feeding:  maximal assistance to feed self while seated EOB ( 2/2 contractures pt requires significant assistance with LUE); TA for postural control  · Upper Body Dressing: maximal assistance too abe fresh gown in front while supine    AMPA 6 Click ADL: 9    Treatment & Education:  -Pt edu on OT role/POC  - White board updated  - Multiple self care tasks completed-- assistance level noted above  - All questions/concerns answered within OT scope of practice  - Re-positioning splint on RUE; edu pt on proper positioning of BUE's to prevent further contracture; position pt in chair position in bed with BUE supported with pillow, trunk in midline,  cervical spine in neutral  - Attempted to call pts daughter-in-law to discuss PLOF and POC-- however did not answer      Patient left up in chair with all lines intact, call button in reach and Rn notified  Education:    Assessment:     Deisi Cheung is a 83 y.o. female with a medical diagnosis of Immobility. Pt alert and willing to participate in therapy session.  She presents with the following performance deficits affecting function are weakness, impaired endurance, gait instability, impaired balance, decreased upper extremity function, decreased lower extremity function, impaired joint extensibility, impaired self care skills, impaired functional mobilty, impaired cognition, pain, abnormal tone, edema. She requires increased level of skilled assistance with ADL and functional mobility at this time. She would benefit from NH SNF following d/c following d/c.     Rehab Prognosis:  Poor; patient would benefit from acute skilled OT services to address these deficits and reach maximum level of function.       Plan:     Patient to be seen 2 x/week to address the above listed problems via self-care/home management, therapeutic activities, therapeutic exercises, neuromuscular re-education  · Plan of Care Expires: 01/13/19  · Plan of Care Reviewed with: patient    This Plan of care has been discussed with the patient who was involved in its development and understands and is in agreement with the identified goals and treatment plan    GOALS:   Multidisciplinary Problems     Occupational Therapy Goals        Problem: Occupational Therapy Goal    Goal Priority Disciplines Outcome Interventions   Occupational Therapy Goal     OT, PT/OT Ongoing (interventions implemented as appropriate)    Description:  Goals to be met by: 12/3 ( goals updated 12/24)    Patient will increase functional independence with ADLs by performing:    UE Dressing with maximum assistance.  Grooming while seated with Maximum assistance.  Supine to sit  with Maximum Assistance.  Squat pivot transfers to/from wheelchair with Moderate Assistance.  Pt verbalized understanding for wear schedule of R UE splint.   Pt/CG demo understanding for postioning and ROM exercises for R UE.                       Time Tracking:     OT Date of Treatment: 12/24/18  OT Start Time: 1042  OT Stop Time: 1112  OT Total Time (min): 30 min    Billable Minutes:Self Care/Home Management 15  Therapeutic Activity 15    Betty bledsoe OT  12/24/2018

## 2018-12-24 NOTE — PROGRESS NOTES
"[facility] Hospital Medicine  Progress Note    Patient Name: Deisi Cheung  MRN: [id number]  Patient Class: OP- Observation   Admission Date: 12/14/2018  Length of Stay: 0 days  Attending Physician: Yodit Issa MD  Primary Care Provider: Santos Mena MD    [facility] Medicine Team: [facility] MED Z Yodit Issa MD    Subjective:     Principal Problem:Immobility    HPI:  84 YO lady with HTN, HLD, Type 2 diabetes on oral hypoglycemics and osteoarthritis who presented with complaint of presistant right arm weakness and right hand swelling for past 5 months. She denies injury or trauma. She has weakness of the entire RUE. She has difficulty with active shoulder, elbow, wrist, and finger motion. She has flexion contracture of the fingers. She notes edema of the hand which is improved with elevation. She is able to passively extend them to some degree however limited by pain.  She denies notable numbness or tingling. Confusion, trauma, swelling in any other extremities any rashes, fever or chills, neck pain,  hx of known CVA. She was evaluated by Orthopedics in past with  prior x-rays which are unremarkable. So she was sent to Neurology for further evaluation and recommended an EMG which has not been done yet. On presentation to ED, she had an MRI brain done that showed " No evidence of acute intracranial pathology.Moderate generalized cerebral volume loss and moderate chronic microvascular ischemic changes" and US of RUE that showed "No thrombus in central veins of the right upper extremity."   Of note pt has been bed bound and not ambulatory for months (no clear inciting events) she has worked with PT and OT in past for her knee pain but has progressively has become bed bound and relies on her family for her ADLs. Family reports that they can no longer meet all her need and are requesting admission for placement to nursing home. We will admit for safe dispo planning, CPK has been " ordered.    Hospital Course:  Mrs. Cheung was admitted for nursing home placement and Neurology evaluation as to cause of progressive immobility and loss o use of right arm and leg weakness. PT consulted. H/H at baseline with anemia. B12 low, started on supplement. Patient hypertensive in absence of pain and started on Norvasc 10 mg po daily to treat. Neurology consulted, MRI of C-spine ordered and returned with only mild DJD of cervical spine but no spinal stenosis and does not explain patient's neurologic symptoms. EMG and NCV ordered on 12/18 to further work-up cause of progressive weakness. PT/OT consulted, recommended SNF placement and  working with family on placement choices. Pain to right arm improved with placement of splint to right hand and forearm to help with contractures. CPK normal at 61. Repeat MRI of thoracic and cervical spine with contrast done on 12/19 as per Neurology recs to make sure no structural cause of neurologic symptoms and repeat MRI unremarkable for any significant disc disease or spinal stenosis to explain patient's neurologic symptoms. Neurology states NCV/EMG can be done as outpatient as low suspicion for motor neuron disease.  working on placement with family and referrals sent to multiple SNF facilities. Patient awaiting SNF placement for discharge as has accepting facility but awaiting insurance authorization and family must sign paperwork.     Interval History: Patient with no change in her clinical status and feeling well and reports right arm pain controlled and right arm and hand swelling improved. Patient awaiting NH SNF placement. Spoke with  and patient has accepting facility and just pending insurance authorization but unlikely to happen today so likely will not be discharged until after [place]. Patient medically ready for discharge.     Review of Systems   Constitutional: Negative for fever.   Respiratory: Negative for cough and  shortness of breath.    Cardiovascular: Negative for chest pain.   Gastrointestinal: Negative for constipation, diarrhea and nausea.   Genitourinary: Negative for dysuria, frequency and hematuria.   Musculoskeletal: Positive for arthralgias and myalgias (Right arm).   Skin: Negative for rash.   Neurological: Positive for weakness (RUE and both legs ). Negative for numbness and headaches.   Psychiatric/Behavioral: Negative for agitation and confusion.     Objective:     Vital Signs (Most Recent):  Temp: 97.8 °F (36.6 °C) (12/24/18 1204)  Pulse: 65 (12/24/18 1204)  Resp: 16 (12/24/18 1204)  BP: 119/66 (12/24/18 1204)  SpO2: 98 % (12/24/18 1204) Vital Signs (24h Range):  Temp:  [97.1 °F (36.2 °C)-98.6 °F (37 °C)] 97.8 °F (36.6 °C)  Pulse:  [65-78] 65  Resp:  [14-19] 16  SpO2:  [97 %-100 %] 98 %  BP: (119-166)/(66-84) 119/66     Weight: 50.9 kg (112 lb 3.4 oz)  Body mass index is 20.52 kg/m².    Intake/Output Summary (Last 24 hours) at 12/24/2018 1250  Last data filed at 12/24/2018 0700  Gross per 24 hour   Intake 60 ml   Output 0 ml   Net 60 ml      Physical Exam   Constitutional: She is oriented to person, place, and time. She appears well-developed. No distress.   Frail elderly lady   HENT:   Head: Normocephalic and atraumatic.   Eyes: No scleral icterus.   Neck: Neck supple.   Cardiovascular: Normal rate, regular rhythm and normal heart sounds. Exam reveals no gallop and no friction rub.   No murmur heard.  Pulmonary/Chest: Effort normal and breath sounds normal. No respiratory distress. She has no wheezes.   Abdominal: Soft. Bowel sounds are normal. She exhibits no distension. There is no tenderness. There is no guarding.   Musculoskeletal: She exhibits edema and tenderness.   Right hand is TTP, swollen with contracture of R fingers and elbow, very limited active movement of R arm or hand, some passive ROM but limited by pain. Splint in place to right arm.      Neurological: She is alert and oriented to person,  "place, and time.   Skin: Skin is warm and dry. She is not diaphoretic.   Psychiatric: She has a normal mood and affect. Her behavior is normal. Thought content normal.   Nursing note and vitals reviewed.      Significant Labs: None in past 24 hours    Significant Imaging: I have reviewed all pertinent imaging results/findings within the past 24 hours.    Assessment/Plan:      * Immobility    Gait disturbance  Localized edema of right hand  Contracture of muscle, right upper arm  Weakness of both lower extremities    - Condition unchanged since admit.   - Patient with progressive immobility and weak over past few months starting five months ago, worsening over past two months. Patient with flexion contracture of right hand, along with right hand edema and weakness as well as "tingling" pain in right elbow over the past few months that has recently worsened. Has seen orthopedics as outpatient with unremarkable Radiology.  - EMG/NCV ordered on 12/18 to further evaluate cause of RUE contractures and leg weakness as per Neurology recs but Neurology states can be done as outpatient as low suspicion for motor neuron disease.  - PT/OT consulted and evaluated patient and following - OT recommending SNF and  working with family and patient on choices. Patient not IP rehab candidate.   - MRI brain done showed "No evidence of acute intracranial pathology"  - US of RUE showed "No thrombus in central veins of the right upper extremity."  - MRI of cervical spine "Mild DJD of C-spine but no stenosis". Repeat MRI of thoracic and cervical spine on 12/19 with contrast with no stenosis or significant disc disease so no structural cause for right arm or leg weakness.   - CPK normal at 61  - Neurology following and appreciate recs and state no further inpatient evaluation needed and EMG/NCV can be done as outpatient and has EMG/NCV ordered for 2/8/2019.   - Pain medication PRN and topical capsaicin to right upper extremity. " Pain related to contracture sand improved with placement of right arm splint.   - Edema to right hand and arm related to immobility and improved with splint.     Patient awaiting NH SNF placement for discharge and medically ready for discharge. Referrals sent to [facility] SNF facilities by social work and has accepting facility but needs insurance authorization. NH SNF orders are done. Patient medially ready for discharge and prescription for Norco placed in patient's chart.     Essential hypertension    · Patient's blood pressure controlled.    · Goal for blood pressure is SBP < 140 and DBP < 90 as patient > or = 60 years of age and patient is diabetic based on JNC 8 guidelines.   · Norvasc 10 mg po daily stopped ( new med started on this admit for HTN) on 12/22 as patient with SBP 93. Patient continued on home meds of Lisinopril 40 mg po daily (home med) and Coreg 25 mg po BID (home med) to treat and BP has been stable on this regimen after Norvasc stopped on 12/22 with no further hypotension. Patient to be discharge to NH on this regimen.  · Plan is to monitor patient's blood pressure routinely while patient is hospitalized.      Type 2 diabetes mellitus without complication, without long-term current use of insulin    Controlled in past 24 hours. Holding home meds of Sitagliptin and Metformin while patient in hospital and treating with insulin but plan to resume on discharge to NH SNF.     Blood Sugars (AccuCheck):    Recent Labs     12/23/18  0844 12/23/18  1250 12/23/18  1707 12/23/18  2133 12/24/18  0845 12/24/18  1247   POCTGLUCOSE 147* 259* 172* 133* 106 122*       · Last HgA1C 6 % and at goal.  · Plan is to continue Levemir 6 units twice a day and Novolog 4 units with meals to treat in hospital but will discharge on Metformin and Sitagliptin on hospital discharge.  · Plan is to continue to monitor POCT glucose 4 times a day with each meal and at bedtime and cover with Novolog low dose sliding scale  insulin.   · Plan is to continue 1500 cat diabetic diet in the hospital.   · Target pre-meal glucose goal is <140 with all random glucoses <180 in non-critically ill patient.     Vitamin B12 deficiency anemia    - Controlled and stable.   - 11/23: B12 175, Ferritin 90, Transferrin 185, Sat Fe: 13, Fe: 35, TIBC: 274  - Patient started on B12 supplement at Vitamin B12 1000 mcg po daily as outpatient in 11/2018 and repeat Vitamin B12 level on this admit 1062. Patient to continue oral Vitamin B12 supplementation on hospital discharge.   - Folate level normal.      Pure hypercholesterolemia    Controlled. Continue Lipitor 40 mg po daily to treat on discharge.            VTE Risk Mitigation (From admission, onward)        Ordered     enoxaparin injection 40 mg  Daily      12/18/18 1631     Place OSHEILA hose  Until discontinued      12/14/18 1741     IP VTE HIGH RISK PATIENT  Once      12/14/18 1741          Discharge Disposition: Patient medically stable for discharge and awaiting insurance authorization. NH SNF orders are complete and prescription for Norco in patient's chart. Has scheduled follow-up with Orthopedics and Neurology and outpatient EMG/NCV.    Yodit Issa MD  Department of Hospital Medicine   [facility]

## 2018-12-24 NOTE — ASSESSMENT & PLAN NOTE
"Gait disturbance  Localized edema of right hand  Contracture of muscle, right upper arm  Weakness of both lower extremities    - Condition unchanged since admit.   - Patient with progressive immobility and weak over past few months starting five months ago, worsening over past two months. Patient with flexion contracture of right hand, along with right hand edema and weakness as well as "tingling" pain in right elbow over the past few months that has recently worsened. Has seen orthopedics as outpatient with unremarkable Radiology.  - EMG/NCV ordered on 12/18 to further evaluate cause of RUE contractures and leg weakness as per Neurology recs but Neurology states can be done as outpatient as low suspicion for motor neuron disease.  - PT/OT consulted and evaluated patient and following - OT recommending SNF and  working with family and patient on choices. Patient not IP rehab candidate.   - MRI brain done showed "No evidence of acute intracranial pathology"  - US of RUE showed "No thrombus in central veins of the right upper extremity."  - MRI of cervical spine "Mild DJD of C-spine but no stenosis". Repeat MRI of thoracic and cervical spine on 12/19 with contrast with no stenosis or significant disc disease so no structural cause for right arm or leg weakness.   - CPK normal at 61  - Neurology following and appreciate recs and state no further inpatient evaluation needed and EMG/NCV can be done as outpatient and has EMG/NCV ordered for 2/8/2019.   - Pain medication PRN and topical capsaicin to right upper extremity. Pain related to contracture sand improved with placement of right arm splint.   - Edema to right hand and arm related to immobility and improved with splint.     Patient awaiting NH SNF placement for discharge and medically ready for discharge. Referrals sent to mutiple NH SNF facilities by social work and has accepting facility but needs insurance authorization. NH SNF orders are done. " Patient medially ready for discharge and prescription for Norco placed in patient's chart.

## 2018-12-24 NOTE — PLAN OF CARE
Problem: Occupational Therapy Goal  Goal: Occupational Therapy Goal  Goals to be met by: 12/3 ( goals updated 12/24)    Patient will increase functional independence with ADLs by performing:    UE Dressing with maximum assistance.  Grooming while seated with Maximum assistance.  Supine to sit with Maximum Assistance.  Squat pivot transfers to/from wheelchair with Moderate Assistance.  Pt verbalized understanding for wear schedule of R UE splint.   Pt/CG demo understanding for postioning and ROM exercises for R UE.     Outcome: Ongoing (interventions implemented as appropriate)  Frequency decreased. Pt displays poor progression with goals at this time.   Betty bledsoe OT  12/24/2018

## 2018-12-24 NOTE — ASSESSMENT & PLAN NOTE
- Controlled and stable.   - 11/23: B12 175, Ferritin 90, Transferrin 185, Sat Fe: 13, Fe: 35, TIBC: 274  - Patient started on B12 supplement at Vitamin B12 1000 mcg po daily as outpatient in 11/2018 and repeat Vitamin B12 level on this admit 1062. Patient to continue oral Vitamin B12 supplementation on hospital discharge.   - Folate level normal.

## 2018-12-24 NOTE — PLAN OF CARE
CASTILLO assigned to case today 12/24/2018. CASTILLO will assist team with DC needs. CASTILLO in communication with CM.    CASTILLO spoke to Ms. Barnhart at [facility]. Ms. Barnhart stated that their faxes were down Friday and to refax the referral.     CASTILLO called [facility] and admissions was gone for the day.    CASTILLO spoke to Elaine at [facility] who stated that the patient is accepted.     CASTILLO called patient's sister in law, Kayli, and explained the above. Kayli spoke with the patient's son who stated to go ahead and pursue [facility].     CASTILLO called Elaine at [facility] and notified her that the family is agreeable to the patient going to [facility]. Elaine stated that she will contact the family to go over financials and to sign paperwork.     CASTILLO sent authorization request to N via [employer].      12/24/18 3932   Post-Acute Status   Post-Acute Authorization Placement   Post-Acute Placement Status Pending Payor Review     Maria Del Rosario Umanzor LMSW  [facility]  [id number]

## 2018-12-24 NOTE — PLAN OF CARE
Problem: Skin Injury Risk Increased  Goal: Skin Health and Integrity    Intervention: Optimize Skin Protection  Turned patient every two hours to prevent skin breakdown.

## 2018-12-24 NOTE — SUBJECTIVE & OBJECTIVE
Interval History: Patient with no change in her clinical status and feeling well and reports right arm pain controlled and right arm and hand swelling improved. Patient awaiting NH SNF placement. Spoke with  and patient has accepting facility and just pending insurance authorization but unlikely to happen today so likely will not be discharged until after [place]. Patient medically ready for discharge.     Review of Systems   Constitutional: Negative for fever.   Respiratory: Negative for cough and shortness of breath.    Cardiovascular: Negative for chest pain.   Gastrointestinal: Negative for constipation, diarrhea and nausea.   Genitourinary: Negative for dysuria, frequency and hematuria.   Musculoskeletal: Positive for arthralgias and myalgias (Right arm).   Skin: Negative for rash.   Neurological: Positive for weakness (RUE and both legs ). Negative for numbness and headaches.   Psychiatric/Behavioral: Negative for agitation and confusion.     Objective:     Vital Signs (Most Recent):  Temp: 97.8 °F (36.6 °C) (12/24/18 1204)  Pulse: 65 (12/24/18 1204)  Resp: 16 (12/24/18 1204)  BP: 119/66 (12/24/18 1204)  SpO2: 98 % (12/24/18 1204) Vital Signs (24h Range):  Temp:  [97.1 °F (36.2 °C)-98.6 °F (37 °C)] 97.8 °F (36.6 °C)  Pulse:  [65-78] 65  Resp:  [14-19] 16  SpO2:  [97 %-100 %] 98 %  BP: (119-166)/(66-84) 119/66     Weight: 50.9 kg (112 lb 3.4 oz)  Body mass index is 20.52 kg/m².    Intake/Output Summary (Last 24 hours) at 12/24/2018 1250  Last data filed at 12/24/2018 0700  Gross per 24 hour   Intake 60 ml   Output 0 ml   Net 60 ml      Physical Exam   Constitutional: She is oriented to person, place, and time. She appears well-developed. No distress.   Frail elderly lady   HENT:   Head: Normocephalic and atraumatic.   Eyes: No scleral icterus.   Neck: Neck supple.   Cardiovascular: Normal rate, regular rhythm and normal heart sounds. Exam reveals no gallop and no friction rub.   No murmur  heard.  Pulmonary/Chest: Effort normal and breath sounds normal. No respiratory distress. She has no wheezes.   Abdominal: Soft. Bowel sounds are normal. She exhibits no distension. There is no tenderness. There is no guarding.   Musculoskeletal: She exhibits edema and tenderness.   Right hand is TTP, swollen with contracture of R fingers and elbow, very limited active movement of R arm or hand, some passive ROM but limited by pain. Splint in place to right arm.      Neurological: She is alert and oriented to person, place, and time.   Skin: Skin is warm and dry. She is not diaphoretic.   Psychiatric: She has a normal mood and affect. Her behavior is normal. Thought content normal.   Nursing note and vitals reviewed.      Significant Labs: None in past 24 hours    Significant Imaging: I have reviewed all pertinent imaging results/findings within the past 24 hours.

## 2018-12-25 NOTE — PROGRESS NOTES
"[facility] Hospital Medicine  Progress Note    Patient Name: Deisi Cheung  MRN: [id number]  Patient Class: OP- Observation   Admission Date: 12/14/2018  Length of Stay: 0 days  Attending Physician: Tessie Mai MD  Primary Care Provider: Santos Mena MD    [facility] Medicine Team: [facility]  Tessie Mai MD    Subjective:     Principal Problem:Immobility    HPI:  84 YO lady with HTN, HLD, Type 2 diabetes on oral hypoglycemics and osteoarthritis who presented with complaint of persistant right arm weakness and right hand swelling for past 5 months. She denies injury or trauma. She has weakness of the entire RUE. She has difficulty with active shoulder, elbow, wrist, and finger motion. She has flexion contracture of the fingers. She notes edema of the hand which is improved with elevation. She is able to passively extend them to some degree however limited by pain.  She denies notable numbness or tingling. Confusion, trauma, swelling in any other extremities any rashes, fever or chills, neck pain,  hx of known CVA. She was evaluated by Orthopedics in past with  prior x-rays which are unremarkable. So she was sent to Neurology for further evaluation and recommended an EMG. On presentation to ED, she had an MRI brain done that showed " No evidence of acute intracranial pathology.Moderate generalized cerebral volume loss and moderate chronic microvascular ischemic changes" and US of RUE that showed "No thrombus in central veins of the right upper extremity."   Of note pt has been bed bound and not ambulatory for months (no clear inciting events) she has worked with PT and OT in past for her knee pain but has progressively has become bed bound and relies on her family for her ADLs. Family reports that they can no longer meet all her need and are requesting admission for placement to nursing home.     Hospital Course:  Mrs. Cheung was admitted for nursing home placement and Neurology " evaluation as to cause of progressive immobility and loss of use of right arm and leg weakness. H/H at baseline with anemia. B12 low, started on supplement. Patient hypertensive in absence of pain and started on Norvasc 10 mg po daily to treat. Neurology consulted, MRI of C-spine ordered and returned with only mild DJD of cervical spine but no spinal stenosis and does not explain patient's neurologic symptoms. EMG and NCV ordered on 12/18 to further work-up cause of progressive weakness. PT/OT consulted, recommended SNF placement and  working with family on placement choices. Pain to right arm improved with placement of splint to right hand and forearm to help with contractures. CPK normal at 61. Repeat MRI of thoracic and cervical spine with contrast done on 12/19 as per Neurology recs to make sure no structural cause of neurologic symptoms and repeat MRI unremarkable for any significant disc disease or spinal stenosis to explain patient's neurologic symptoms. Neurology states NCV/EMG can be done as outpatient as low suspicion for motor neuron disease.  working on placement with family and referrals sent to multiple SNF facilities. Patient awaiting SNF placement for discharge as has accepting facility but awaiting insurance authorization and family must sign paperwork.     Interval History:  Patient awaiting NH SNF placement. Spoke with  and patient has accepting facility and just pending insurance authorization but unlikely to happen until after [place]. Patient medically ready for discharge.     Review of Systems   Constitutional: Negative for fever.   Respiratory: Negative for cough and shortness of breath.    Cardiovascular: Negative for chest pain.   Gastrointestinal: Negative for constipation, diarrhea and nausea.   Genitourinary: Negative for dysuria, frequency and hematuria.   Musculoskeletal: Positive for arthralgias and myalgias (Right arm).   Skin: Negative for  rash.   Neurological: Positive for weakness (RUE and both legs ). Negative for numbness and headaches.   Psychiatric/Behavioral: Negative for agitation and confusion.     Objective:     Vital Signs (Most Recent):  Temp: 98.1 °F (36.7 °C) (12/25/18 0728)  Pulse: 74 (12/25/18 0728)  Resp: 16 (12/25/18 0728)  BP: (!) 143/65 (12/25/18 0728)  SpO2: 99 % (12/25/18 0728) Vital Signs (24h Range):  Temp:  [97.8 °F (36.6 °C)-98.6 °F (37 °C)] 98.1 °F (36.7 °C)  Pulse:  [61-78] 74  Resp:  [12-18] 16  SpO2:  [96 %-99 %] 99 %  BP: (119-154)/(65-75) 143/65     Weight: 50.9 kg (112 lb 3.4 oz)  Body mass index is 20.52 kg/m².    Intake/Output Summary (Last 24 hours) at 12/25/2018 1118  Last data filed at 12/24/2018 1400  Gross per 24 hour   Intake 240 ml   Output 101 ml   Net 139 ml      Physical Exam   Constitutional: She is oriented to person, place, and time. She appears well-developed. No distress.   Frail elderly lady   HENT:   Head: Normocephalic and atraumatic.   Eyes: No scleral icterus.   Neck: Neck supple.   Cardiovascular: Normal rate, regular rhythm and normal heart sounds. Exam reveals no gallop and no friction rub.   No murmur heard.  Pulmonary/Chest: Effort normal and breath sounds normal. No respiratory distress. She has no wheezes.   Abdominal: Soft. Bowel sounds are normal. She exhibits no distension. There is no tenderness. There is no guarding.   Musculoskeletal: She exhibits edema and tenderness.   Right hand is TTP, swollen with contracture of R fingers and elbow, very limited active movement of R arm or hand, some passive ROM but limited by pain. Splint in place to right arm.      Neurological: She is alert and oriented to person, place, and time.   Skin: Skin is warm and dry. She is not diaphoretic.   Psychiatric: She has a normal mood and affect. Her behavior is normal. Thought content normal.   Nursing note and vitals reviewed.      Significant Labs: None in past 24 hours    Significant Imaging: I have  "reviewed all pertinent imaging results/findings within the past 24 hours.    Assessment/Plan:      * Immobility    Gait disturbance  Localized edema of right hand  Contracture of muscle, right upper arm  Weakness of both lower extremities    - Condition unchanged since admit.   - Patient with progressive immobility and weak over past few months starting five months ago, worsening over past two months. Patient with flexion contracture of right hand, along with right hand edema and weakness as well as "tingling" pain in right elbow over the past few months that has recently worsened. Has seen orthopedics as outpatient with unremarkable Radiology.  - EMG/NCV ordered on 12/18 to further evaluate cause of RUE contractures and leg weakness as per Neurology recs but Neurology states can be done as outpatient as low suspicion for motor neuron disease.  - PT/OT consulted and evaluated patient and following - OT recommending SNF and  working with family and patient on choices. Patient not IP rehab candidate.   - MRI brain done showed "No evidence of acute intracranial pathology"  - US of RUE showed "No thrombus in central veins of the right upper extremity."  - MRI of cervical spine "Mild DJD of C-spine but no stenosis". Repeat MRI of thoracic and cervical spine on 12/19 with contrast with no stenosis or significant disc disease so no structural cause for right arm or leg weakness.   - CPK normal at 61  - Neurology following and appreciate recs and state no further inpatient evaluation needed and EMG/NCV can be done as outpatient and has EMG/NCV ordered for 2/8/2019.   - Pain medication PRN and topical capsaicin to right upper extremity. Pain related to contracture sand improved with placement of right arm splint.   - Edema to right hand and arm related to immobility and improved with splint.     Patient awaiting NH SNF placement for discharge and medically ready for discharge. Referrals sent to [facility] " facilities by social work and has accepting facility but needs insurance authorization. NH SNF orders are done. Patient medially ready for discharge and prescription for Norco placed in patient's chart.     Essential hypertension    · Patient's blood pressure controlled.    · Goal for blood pressure is SBP < 140 and DBP < 90 as patient > or = 60 years of age and patient is diabetic based on JNC 8 guidelines.   · Norvasc 10 mg po daily stopped ( new med started on this admit for HTN) on 12/22 as patient with SBP 93. Patient continued on home meds of Lisinopril 40 mg po daily (home med) and Coreg 25 mg po BID (home med) to treat and BP has been stable on this regimen after Norvasc stopped on 12/22 with no further hypotension. Patient to be discharge to NH on this regimen.  · Plan is to monitor patient's blood pressure routinely while patient is hospitalized.      Type 2 diabetes mellitus without complication, without long-term current use of insulin    Controlled in past 24 hours. Holding home meds of Sitagliptin and Metformin while patient in hospital and treating with insulin but plan to resume on discharge to NH SNF.     Blood Sugars (AccuCheck):    Recent Labs     12/24/18  0845 12/24/18  1247 12/24/18  1756 12/24/18  1757 12/24/18  2023 12/25/18  0735   POCTGLUCOSE 106 122* 59* 61* 212* 112*       · Last HgA1C 6 % and at goal.  · Plan is to continue Levemir 6 units twice a day and Novolog 4 units with meals to treat in hospital but will discharge on Metformin and Sitagliptin on hospital discharge.  · Plan is to continue to monitor POCT glucose 4 times a day with each meal and at bedtime and cover with Novolog low dose sliding scale insulin.   · Plan is to continue 1500 cat diabetic diet in the hospital.   · Target pre-meal glucose goal is <140 with all random glucoses <180 in non-critically ill patient.     Vitamin B12 deficiency anemia    - Controlled and stable.   - 11/23: B12 175, Ferritin 90, Transferrin 185,  "Sat Fe: 13, Fe: 35, TIBC: 274  - Patient started on B12 supplement at Vitamin B12 1000 mcg po daily as outpatient in 11/2018 and repeat Vitamin B12 level on this admit 1062. Patient to continue oral Vitamin B12 supplementation on hospital discharge.   - Folate level normal.      Pure hypercholesterolemia    Controlled. Continue Lipitor 40 mg po daily to treat on discharge.      Localized edema    R hand edema and weakness, pain improved  Unclear exact etiology  MRI brain ana maria howed "No evidence of acute intracranial pathology"  US showed "No thrombus in central veins of the right upper extremity."  Pt was supposed to have outpt EMG done by neurology  PT and OT  Pain medication PRN  Topical capsaicin        VTE Risk Mitigation (From admission, onward)        Ordered     enoxaparin injection 40 mg  Daily      12/18/18 1631     Place SOHEILA hose  Until discontinued      12/14/18 1741     IP VTE HIGH RISK PATIENT  Once      12/14/18 1741              Tessie Mai MD  Department of Hospital Medicine   [facility]  "

## 2018-12-25 NOTE — SUBJECTIVE & OBJECTIVE
Interval History:  Patient awaiting NH SNF placement. Spoke with  and patient has accepting facility and just pending insurance authorization but unlikely to happen until after [place]. Patient medically ready for discharge.     Review of Systems   Constitutional: Negative for fever.   Respiratory: Negative for cough and shortness of breath.    Cardiovascular: Negative for chest pain.   Gastrointestinal: Negative for constipation, diarrhea and nausea.   Genitourinary: Negative for dysuria, frequency and hematuria.   Musculoskeletal: Positive for arthralgias and myalgias (Right arm).   Skin: Negative for rash.   Neurological: Positive for weakness (RUE and both legs ). Negative for numbness and headaches.   Psychiatric/Behavioral: Negative for agitation and confusion.     Objective:     Vital Signs (Most Recent):  Temp: 98.1 °F (36.7 °C) (12/25/18 0728)  Pulse: 74 (12/25/18 0728)  Resp: 16 (12/25/18 0728)  BP: (!) 143/65 (12/25/18 0728)  SpO2: 99 % (12/25/18 0728) Vital Signs (24h Range):  Temp:  [97.8 °F (36.6 °C)-98.6 °F (37 °C)] 98.1 °F (36.7 °C)  Pulse:  [61-78] 74  Resp:  [12-18] 16  SpO2:  [96 %-99 %] 99 %  BP: (119-154)/(65-75) 143/65     Weight: 50.9 kg (112 lb 3.4 oz)  Body mass index is 20.52 kg/m².    Intake/Output Summary (Last 24 hours) at 12/25/2018 1118  Last data filed at 12/24/2018 1400  Gross per 24 hour   Intake 240 ml   Output 101 ml   Net 139 ml      Physical Exam   Constitutional: She is oriented to person, place, and time. She appears well-developed. No distress.   Frail elderly lady   HENT:   Head: Normocephalic and atraumatic.   Eyes: No scleral icterus.   Neck: Neck supple.   Cardiovascular: Normal rate, regular rhythm and normal heart sounds. Exam reveals no gallop and no friction rub.   No murmur heard.  Pulmonary/Chest: Effort normal and breath sounds normal. No respiratory distress. She has no wheezes.   Abdominal: Soft. Bowel sounds are normal. She exhibits no distension.  There is no tenderness. There is no guarding.   Musculoskeletal: She exhibits edema and tenderness.   Right hand is TTP, swollen with contracture of R fingers and elbow, very limited active movement of R arm or hand, some passive ROM but limited by pain. Splint in place to right arm.      Neurological: She is alert and oriented to person, place, and time.   Skin: Skin is warm and dry. She is not diaphoretic.   Psychiatric: She has a normal mood and affect. Her behavior is normal. Thought content normal.   Nursing note and vitals reviewed.      Significant Labs: None in past 24 hours    Significant Imaging: I have reviewed all pertinent imaging results/findings within the past 24 hours.

## 2018-12-25 NOTE — PT/OT/SLP PROGRESS
Physical Therapy      Patient Name:  Deisi Cheung   MRN:  [id number]    Pt screened today for re-eval.  Pt continues to demo functional mobility at baseline.  Performed bed mobility c total A.  Continuing to recommend pt return home c 24hr care and HHPT or NH for shelter care c HHPT.    Kasi Baker, PT   12/25/2018

## 2018-12-25 NOTE — ASSESSMENT & PLAN NOTE
Controlled in past 24 hours. Holding home meds of Sitagliptin and Metformin while patient in hospital and treating with insulin but plan to resume on discharge to NH SNF.     Blood Sugars (AccuCheck):    Recent Labs     12/24/18  0845 12/24/18  1247 12/24/18  1756 12/24/18  1757 12/24/18 2023 12/25/18  0735   POCTGLUCOSE 106 122* 59* 61* 212* 112*       · Last HgA1C 6 % and at goal.  · Plan is to continue Levemir 6 units twice a day and Novolog 4 units with meals to treat in hospital but will discharge on Metformin and Sitagliptin on hospital discharge.  · Plan is to continue to monitor POCT glucose 4 times a day with each meal and at bedtime and cover with Novolog low dose sliding scale insulin.   · Plan is to continue 1500 cat diabetic diet in the hospital.   · Target pre-meal glucose goal is <140 with all random glucoses <180 in non-critically ill patient.

## 2018-12-25 NOTE — PLAN OF CARE
Problem: Adult Inpatient Plan of Care  Goal: Plan of Care Review  Outcome: Ongoing (interventions implemented as appropriate)  Pt resting in bed, turned every two hours, medicated for pain x 1, briefs changed x 2 for urine and BM, call light in reach, side rails up x 2 for safety, will cont to monitor pt.

## 2018-12-26 NOTE — SUBJECTIVE & OBJECTIVE
Interval History:  Patient awaiting NH SNF placement. Spoke with  and patient has accepting facility and just pending insurance authorization but unlikely to happen until 12/27. Patient medically ready for discharge.     Review of Systems   Constitutional: Negative for fever.   Respiratory: Negative for cough and shortness of breath.    Cardiovascular: Negative for chest pain.   Gastrointestinal: Negative for constipation, diarrhea and nausea.   Genitourinary: Negative for dysuria, frequency and hematuria.   Musculoskeletal: Positive for arthralgias and myalgias (Right arm).   Skin: Negative for rash.   Neurological: Positive for weakness (RUE and both legs ). Negative for numbness and headaches.   Psychiatric/Behavioral: Negative for agitation and confusion.     Objective:     Vital Signs (Most Recent):  Temp: 98 °F (36.7 °C) (12/26/18 0744)  Pulse: 72 (12/26/18 0744)  Resp: 18 (12/26/18 0744)  BP: 138/74 (12/26/18 0744)  SpO2: 100 % (12/26/18 0744) Vital Signs (24h Range):  Temp:  [98 °F (36.7 °C)-99.2 °F (37.3 °C)] 98 °F (36.7 °C)  Pulse:  [67-80] 72  Resp:  [16-18] 18  SpO2:  [96 %-100 %] 100 %  BP: (119-156)/(62-74) 138/74     Weight: 50.9 kg (112 lb 3.4 oz)  Body mass index is 20.52 kg/m².    Intake/Output Summary (Last 24 hours) at 12/26/2018 1138  Last data filed at 12/25/2018 1400  Gross per 24 hour   Intake 320 ml   Output 3 ml   Net 317 ml      Physical Exam   Constitutional: She is oriented to person, place, and time. She appears well-developed. No distress.   Frail elderly lady   HENT:   Head: Normocephalic and atraumatic.   Eyes: No scleral icterus.   Neck: Neck supple.   Cardiovascular: Normal rate, regular rhythm and normal heart sounds. Exam reveals no gallop and no friction rub.   No murmur heard.  Pulmonary/Chest: Effort normal and breath sounds normal. No respiratory distress. She has no wheezes.   Abdominal: Soft. Bowel sounds are normal. She exhibits no distension. There is no  tenderness. There is no guarding.   Musculoskeletal: She exhibits edema and tenderness.   Right hand is TTP, swollen with contracture of R fingers and elbow, very limited active movement of R arm or hand, some passive ROM but limited by pain. Splint in place to right arm.      Neurological: She is alert and oriented to person, place, and time.   Skin: Skin is warm and dry. She is not diaphoretic.   Psychiatric: She has a normal mood and affect. Her behavior is normal. Thought content normal.   Nursing note and vitals reviewed.      Significant Labs: None in past 24 hours    Significant Imaging: I have reviewed all pertinent imaging results/findings within the past 24 hours.

## 2018-12-26 NOTE — PROGRESS NOTES
"[facility] Hospital Medicine  Progress Note    Patient Name: Deisi Cheung  MRN: [id number]  Patient Class: OP- Observation   Admission Date: 12/14/2018  Length of Stay: 0 days  Attending Physician: Tessie Mai MD  Primary Care Provider: Santos Mena MD    [facility] Medicine Team: [facility]OSP MED  Tessie Mai MD    Subjective:     Principal Problem:Immobility    HPI:  82 YO lady with HTN, HLD, Type 2 diabetes on oral hypoglycemics and osteoarthritis who presented with complaint of persistant right arm weakness and right hand swelling for past 5 months. She denies injury or trauma. She has weakness of the entire RUE. She has difficulty with active shoulder, elbow, wrist, and finger motion. She has flexion contracture of the fingers. She notes edema of the hand which is improved with elevation. She is able to passively extend them to some degree however limited by pain.  She denies notable numbness or tingling. Confusion, trauma, swelling in any other extremities any rashes, fever or chills, neck pain,  hx of known CVA. She was evaluated by Orthopedics in past with  prior x-rays which are unremarkable. So she was sent to Neurology for further evaluation and recommended an EMG. On presentation to ED, she had an MRI brain done that showed " No evidence of acute intracranial pathology.Moderate generalized cerebral volume loss and moderate chronic microvascular ischemic changes" and US of RUE that showed "No thrombus in central veins of the right upper extremity."   Of note pt has been bed bound and not ambulatory for months (no clear inciting events) she has worked with PT and OT in past for her knee pain but has progressively has become bed bound and relies on her family for her ADLs. Family reports that they can no longer meet all her need and are requesting admission for placement to nursing home.     Hospital Course:  Mrs. Cheung was admitted for nursing home placement and Neurology " evaluation as to cause of progressive immobility and loss of use of right arm and leg weakness. H/H at baseline with anemia. B12 low, started on supplement. Patient hypertensive in absence of pain and started on Norvasc 10 mg po daily to treat. Neurology consulted, MRI of C-spine ordered and returned with only mild DJD of cervical spine but no spinal stenosis and does not explain patient's neurologic symptoms. EMG and NCV ordered on 12/18 to further work-up cause of progressive weakness. PT/OT consulted, recommended SNF placement and  working with family on placement choices. Pain to right arm improved with placement of splint to right hand and forearm to help with contractures. CPK normal at 61. Repeat MRI of thoracic and cervical spine with contrast done on 12/19 as per Neurology recs to make sure no structural cause of neurologic symptoms and repeat MRI unremarkable for any significant disc disease or spinal stenosis to explain patient's neurologic symptoms. Neurology states NCV/EMG can be done as outpatient as low suspicion for motor neuron disease.  working on placement with family and referrals sent to multiple SNF facilities. Patient awaiting SNF placement for discharge as has accepting facility but awaiting insurance authorization and family must sign paperwork- likely 12/27    Interval History:  Patient awaiting NH SNF placement. Spoke with  and patient has accepting facility and just pending insurance authorization but unlikely to happen until 12/27. Patient medically ready for discharge.     Review of Systems   Constitutional: Negative for fever.   Respiratory: Negative for cough and shortness of breath.    Cardiovascular: Negative for chest pain.   Gastrointestinal: Negative for constipation, diarrhea and nausea.   Genitourinary: Negative for dysuria, frequency and hematuria.   Musculoskeletal: Positive for arthralgias and myalgias (Right arm).   Skin: Negative for  rash.   Neurological: Positive for weakness (RUE and both legs ). Negative for numbness and headaches.   Psychiatric/Behavioral: Negative for agitation and confusion.     Objective:     Vital Signs (Most Recent):  Temp: 98 °F (36.7 °C) (12/26/18 0744)  Pulse: 72 (12/26/18 0744)  Resp: 18 (12/26/18 0744)  BP: 138/74 (12/26/18 0744)  SpO2: 100 % (12/26/18 0744) Vital Signs (24h Range):  Temp:  [98 °F (36.7 °C)-99.2 °F (37.3 °C)] 98 °F (36.7 °C)  Pulse:  [67-80] 72  Resp:  [16-18] 18  SpO2:  [96 %-100 %] 100 %  BP: (119-156)/(62-74) 138/74     Weight: 50.9 kg (112 lb 3.4 oz)  Body mass index is 20.52 kg/m².    Intake/Output Summary (Last 24 hours) at 12/26/2018 1138  Last data filed at 12/25/2018 1400  Gross per 24 hour   Intake 320 ml   Output 3 ml   Net 317 ml      Physical Exam   Constitutional: She is oriented to person, place, and time. She appears well-developed. No distress.   Frail elderly lady   HENT:   Head: Normocephalic and atraumatic.   Eyes: No scleral icterus.   Neck: Neck supple.   Cardiovascular: Normal rate, regular rhythm and normal heart sounds. Exam reveals no gallop and no friction rub.   No murmur heard.  Pulmonary/Chest: Effort normal and breath sounds normal. No respiratory distress. She has no wheezes.   Abdominal: Soft. Bowel sounds are normal. She exhibits no distension. There is no tenderness. There is no guarding.   Musculoskeletal: She exhibits edema and tenderness.   Right hand is TTP, swollen with contracture of R fingers and elbow, very limited active movement of R arm or hand, some passive ROM but limited by pain. Splint in place to right arm.      Neurological: She is alert and oriented to person, place, and time.   Skin: Skin is warm and dry. She is not diaphoretic.   Psychiatric: She has a normal mood and affect. Her behavior is normal. Thought content normal.   Nursing note and vitals reviewed.      Significant Labs: None in past 24 hours    Significant Imaging: I have reviewed  "all pertinent imaging results/findings within the past 24 hours.    Assessment/Plan:      * Immobility    Gait disturbance  Localized edema of right hand  Contracture of muscle, right upper arm  Weakness of both lower extremities    - Condition unchanged since admit.   - Patient with progressive immobility and weak over past few months starting five months ago, worsening over past two months. Patient with flexion contracture of right hand, along with right hand edema and weakness as well as "tingling" pain in right elbow over the past few months that has recently worsened. Has seen orthopedics as outpatient with unremarkable Radiology.  - EMG/NCV ordered on 12/18 to further evaluate cause of RUE contractures and leg weakness as per Neurology recs but Neurology states can be done as outpatient as low suspicion for motor neuron disease.  - PT/OT consulted and evaluated patient and following - OT recommending SNF and  working with family and patient on choices. Patient not IP rehab candidate.   - MRI brain done showed "No evidence of acute intracranial pathology"  - US of RUE showed "No thrombus in central veins of the right upper extremity."  - MRI of cervical spine "Mild DJD of C-spine but no stenosis". Repeat MRI of thoracic and cervical spine on 12/19 with contrast with no stenosis or significant disc disease so no structural cause for right arm or leg weakness.   - CPK normal at 61  - Neurology following and appreciate recs and state no further inpatient evaluation needed and EMG/NCV can be done as outpatient and has EMG/NCV ordered for 2/8/2019.   - Pain medication PRN and topical capsaicin to right upper extremity. Pain related to contracture sand improved with placement of right arm splint.   - Edema to right hand and arm related to immobility and improved with splint.     Patient awaiting NH SNF placement for discharge and medically ready for discharge. Referrals sent to mutiple NH SNF facilities " by social work and has accepting facility but needs insurance authorization. NH SNF orders are done. Patient medially ready for discharge and prescription for Norco placed in patient's chart.     Essential hypertension    · Patient's blood pressure controlled.    · Goal for blood pressure is SBP < 140 and DBP < 90 as patient > or = 60 years of age and patient is diabetic based on JNC 8 guidelines.   · Norvasc 10 mg po daily stopped ( new med started on this admit for HTN) on 12/22 as patient with SBP 93. Patient continued on home meds of Lisinopril 40 mg po daily (home med) and Coreg 25 mg po BID (home med) to treat and BP has been stable on this regimen after Norvasc stopped on 12/22 with no further hypotension. Patient to be discharge to NH on this regimen.  · Plan is to monitor patient's blood pressure routinely while patient is hospitalized.      Type 2 diabetes mellitus without complication, without long-term current use of insulin    Controlled in past 24 hours. Holding home meds of Sitagliptin and Metformin while patient in hospital and treating with insulin but plan to resume on discharge to NH SNF.     Blood Sugars (AccuCheck):    Recent Labs     12/24/18  0845 12/24/18  1247 12/24/18  1756 12/24/18  1757 12/24/18  2023 12/25/18  0735   POCTGLUCOSE 106 122* 59* 61* 212* 112*       · Last HgA1C 6 % and at goal.  · Plan is to continue Levemir 6 units twice a day and Novolog 4 units with meals to treat in hospital but will discharge on Metformin and Sitagliptin on hospital discharge.  · Plan is to continue to monitor POCT glucose 4 times a day with each meal and at bedtime and cover with Novolog low dose sliding scale insulin.   · Plan is to continue 1500 cat diabetic diet in the hospital.   · Target pre-meal glucose goal is <140 with all random glucoses <180 in non-critically ill patient.     Vitamin B12 deficiency anemia    - Controlled and stable.   - 11/23: B12 175, Ferritin 90, Transferrin 185, Sat Fe:  "13, Fe: 35, TIBC: 274  - Patient started on B12 supplement at Vitamin B12 1000 mcg po daily as outpatient in 11/2018 and repeat Vitamin B12 level on this admit 1062. Patient to continue oral Vitamin B12 supplementation on hospital discharge.   - Folate level normal.      Pure hypercholesterolemia    Controlled. Continue Lipitor 40 mg po daily to treat on discharge.      Localized edema    R hand edema and weakness, pain improved  Unclear exact etiology  MRI brain ana maria howed "No evidence of acute intracranial pathology"  US showed "No thrombus in central veins of the right upper extremity."  Pt was supposed to have outpt EMG done by neurology  PT and OT  Pain medication PRN  Topical capsaicin        VTE Risk Mitigation (From admission, onward)        Ordered     enoxaparin injection 40 mg  Daily      12/18/18 1631     Place SOHEILA hose  Until discontinued      12/14/18 1741     IP VTE HIGH RISK PATIENT  Once      12/14/18 1741              Tessie Mia MD  Department of Hospital Medicine   [facility]  "

## 2018-12-26 NOTE — PLAN OF CARE
Problem: Adult Inpatient Plan of Care  Goal: Patient-Specific Goal (Individualization)  Outcome: Ongoing (interventions implemented as appropriate)  Plan of care reviewed with pt and family. Pt able to communicate needs and complains of pain to R shoulder, resolves with repositioning. Pt is a turn Q 2. Pt expecting to discharge when bed available at SNF. Pt family at bedside for visit. Family supportive in pt care. Nursing will continue to monitor for changes in status.

## 2018-12-26 NOTE — PLAN OF CARE
CASTILLO following for DC needs. CASTILLO in communication with CM.    CASTILLO received call from Elaine at [facility]. The family toured the facility today and signed admission paperwork. Pending PHN auth. As of Monday afternoon PHN had sent auth to MRU.     Maria Del Rosario Umanzor, LMSW [facility]  [id number]

## 2018-12-26 NOTE — PLAN OF CARE
Problem: Adult Inpatient Plan of Care  Goal: Plan of Care Review  Outcome: Ongoing (interventions implemented as appropriate)  Pt resting in bed, medicated for pain x 2, turned every two hours, arm brace in place, call light in reach, will cont to monitor pt.

## 2018-12-27 NOTE — SUBJECTIVE & OBJECTIVE
Interval History:   Patient medically ready for discharge, awaiting placement.  [facility] denied SNF as pt is at baseline. Will need [facility] nursing home or home care with home health and 24 hour care. Social work to discuss with family.     Review of Systems   Constitutional: Negative for fever.   Respiratory: Negative for cough and shortness of breath.    Cardiovascular: Negative for chest pain.   Gastrointestinal: Negative for constipation, diarrhea and nausea.   Genitourinary: Negative for dysuria, frequency and hematuria.   Musculoskeletal: Positive for arthralgias and myalgias (Right arm).   Skin: Negative for rash.   Neurological: Positive for weakness (RUE and both legs ). Negative for numbness and headaches.   Psychiatric/Behavioral: Negative for agitation and confusion.     Objective:     Vital Signs (Most Recent):  Temp: 98.2 °F (36.8 °C) (12/27/18 1125)  Pulse: (!) 57 (12/27/18 1618)  Resp: 18 (12/27/18 1618)  BP: (!) 157/71 (12/27/18 1618)  SpO2: 100 % (12/27/18 1618) Vital Signs (24h Range):  Temp:  [97.6 °F (36.4 °C)-99.3 °F (37.4 °C)] 98.2 °F (36.8 °C)  Pulse:  [57-73] 57  Resp:  [16-18] 18  SpO2:  [95 %-100 %] 100 %  BP: (131-170)/(61-82) 157/71     Weight: 50.9 kg (112 lb 3.4 oz)  Body mass index is 20.52 kg/m².  No intake or output data in the 24 hours ending 12/27/18 1624   Physical Exam   Constitutional: She is oriented to person, place, and time. She appears well-developed. No distress.   Frail elderly lady   HENT:   Head: Normocephalic and atraumatic.   Eyes: No scleral icterus.   Neck: Neck supple.   Cardiovascular: Normal rate, regular rhythm and normal heart sounds. Exam reveals no gallop and no friction rub.   No murmur heard.  Pulmonary/Chest: Effort normal and breath sounds normal. No respiratory distress. She has no wheezes.   Abdominal: Soft. Bowel sounds are normal. She exhibits no distension. There is no tenderness. There is no guarding.   Musculoskeletal: She exhibits edema and  tenderness.   Right hand is TTP, swollen with contracture of R fingers and elbow, very limited active movement of R arm or hand, some passive ROM but limited by pain. Splint in place to right arm.      Neurological: She is alert and oriented to person, place, and time.   Skin: Skin is warm and dry. She is not diaphoretic.   Psychiatric: She has a normal mood and affect. Her behavior is normal. Thought content normal.   Nursing note and vitals reviewed.      Significant Labs: None in past 24 hours    Significant Imaging: I have reviewed all pertinent imaging results/findings within the past 24 hours.

## 2018-12-27 NOTE — PLAN OF CARE
Problem: Adult Inpatient Plan of Care  Goal: Plan of Care Review  Outcome: Ongoing (interventions implemented as appropriate)  PT turned Q2h and PRN. Medication given per MAR for pain. Call light in reach, bed alarm active, door open, will continue to monitor.

## 2018-12-27 NOTE — PLAN OF CARE
[facility] has denied NH SNF for the patient. Cm  called [facility] to set up peer to peer. Cm  left a voicemail and awaiting return call from [facility] to set up peer to peer.    247 pm   Eduarda from [facility] called and Cm  gave her all the doctors' information. Cm  asked when she would call the MD. Cm  was told by Eduarda that she could not give a time but she would try to call the today. Eduarda's number is [phone]. Dr. Mai notified of all the above information.

## 2018-12-27 NOTE — ASSESSMENT & PLAN NOTE
"Gait disturbance  Localized edema of right hand  Contracture of muscle, right upper arm  Weakness of both lower extremities    - Condition unchanged since admit.   - Patient with progressive immobility and weak over past few months starting five months ago, worsening over past two months. Patient with flexion contracture of right hand, along with right hand edema and weakness as well as "tingling" pain in right elbow over the past few months that has recently worsened. Has seen orthopedics as outpatient with unremarkable Radiology.  - EMG/NCV ordered on 12/18 to further evaluate cause of RUE contractures and leg weakness as per Neurology recs but Neurology states can be done as outpatient as low suspicion for motor neuron disease.  - PT/OT consulted and evaluated patient and following - OT recommending SNF and  working with family and patient on choices. Patient not IP rehab candidate.   - MRI brain done showed "No evidence of acute intracranial pathology"  - US of RUE showed "No thrombus in central veins of the right upper extremity."  - MRI of cervical spine "Mild DJD of C-spine but no stenosis". Repeat MRI of thoracic and cervical spine on 12/19 with contrast with no stenosis or significant disc disease so no structural cause for right arm or leg weakness.   - CPK normal at 61  - Neurology following and appreciate recs and state no further inpatient evaluation needed and EMG/NCV can be done as outpatient and has EMG/NCV ordered for 2/8/2019.   - Pain medication PRN and topical capsaicin to right upper extremity. Pain related to contracture sand improved with placement of right arm splint.   - Edema to right hand and arm related to immobility and improved with splint.       "

## 2018-12-27 NOTE — PROGRESS NOTES
"[facility] Hospital Medicine  Progress Note    Patient Name: Deisi Cheung  MRN: [id number]  Patient Class: OP- Observation   Admission Date: 12/14/2018  Length of Stay: 0 days  Attending Physician: Tessie Mai MD  Primary Care Provider: Santos Mena MD    [facility] Medicine Team: [facility]P MED  Tessie Mai MD    Subjective:     Principal Problem:Immobility    HPI:  84 YO lady with HTN, HLD, Type 2 diabetes on oral hypoglycemics and osteoarthritis who presented with complaint of persistant right arm weakness and right hand swelling for past 5 months. She denies injury or trauma. She has weakness of the entire RUE. She has difficulty with active shoulder, elbow, wrist, and finger motion. She has flexion contracture of the fingers. She notes edema of the hand which is improved with elevation. She is able to passively extend them to some degree however limited by pain.  She denies notable numbness or tingling. Confusion, trauma, swelling in any other extremities any rashes, fever or chills, neck pain,  hx of known CVA. She was evaluated by Orthopedics in past with  prior x-rays which are unremarkable. So she was sent to Neurology for further evaluation and recommended an EMG. On presentation to ED, she had an MRI brain done that showed " No evidence of acute intracranial pathology.Moderate generalized cerebral volume loss and moderate chronic microvascular ischemic changes" and US of RUE that showed "No thrombus in central veins of the right upper extremity."   Of note pt has been bed bound and not ambulatory for months (no clear inciting events) she has worked with PT and OT in past for her knee pain but has progressively has become bed bound and relies on her family for her ADLs. Family reports that they can no longer meet all her need and are requesting admission for placement to nursing home.     Hospital Course:  Mrs. Cheung was admitted for nursing home placement and Neurology " evaluation as to cause of progressive immobility and loss of use of right arm and leg weakness. H/H at baseline with anemia. B12 low, started on supplement. Patient hypertensive in absence of pain and started on Norvasc 10 mg po daily to treat. Neurology consulted, MRI of C-spine ordered and returned with only mild DJD of cervical spine but no spinal stenosis and does not explain patient's neurologic symptoms. EMG and NCV ordered on 12/18 to further work-up cause of progressive weakness. PT/OT consulted, recommended SNF placement and  working with family on placement choices. Pain to right arm improved with placement of splint to right hand and forearm to help with contractures. CPK normal at 61. Repeat MRI of thoracic and cervical spine with contrast done on 12/19 as per Neurology recs to make sure no structural cause of neurologic symptoms and repeat MRI unremarkable for any significant disc disease or spinal stenosis to explain patient's neurologic symptoms. Neurology states NCV/EMG can be done as outpatient as low suspicion for motor neuron disease.     Interval History:   Patient medically ready for discharge, awaiting placement.  PHN denied SNF as pt is at baseline. [facility] or [facility] care with home health and 24 hour care. Social work to discuss with family.     Review of Systems   Constitutional: Negative for fever.   Respiratory: Negative for cough and shortness of breath.    Cardiovascular: Negative for chest pain.   Gastrointestinal: Negative for constipation, diarrhea and nausea.   Genitourinary: Negative for dysuria, frequency and hematuria.   Musculoskeletal: Positive for arthralgias and myalgias (Right arm).   Skin: Negative for rash.   Neurological: Positive for weakness (RUE and both legs ). Negative for numbness and headaches.   Psychiatric/Behavioral: Negative for agitation and confusion.     Objective:     Vital Signs (Most Recent):  Temp: 98.2 °F (36.8 °C)  (12/27/18 1125)  Pulse: (!) 57 (12/27/18 1618)  Resp: 18 (12/27/18 1618)  BP: (!) 157/71 (12/27/18 1618)  SpO2: 100 % (12/27/18 1618) Vital Signs (24h Range):  Temp:  [97.6 °F (36.4 °C)-99.3 °F (37.4 °C)] 98.2 °F (36.8 °C)  Pulse:  [57-73] 57  Resp:  [16-18] 18  SpO2:  [95 %-100 %] 100 %  BP: (131-170)/(61-82) 157/71     Weight: 50.9 kg (112 lb 3.4 oz)  Body mass index is 20.52 kg/m².  No intake or output data in the 24 hours ending 12/27/18 1624   Physical Exam   Constitutional: She is oriented to person, place, and time. She appears well-developed. No distress.   Frail elderly lady   HENT:   Head: Normocephalic and atraumatic.   Eyes: No scleral icterus.   Neck: Neck supple.   Cardiovascular: Normal rate, regular rhythm and normal heart sounds. Exam reveals no gallop and no friction rub.   No murmur heard.  Pulmonary/Chest: Effort normal and breath sounds normal. No respiratory distress. She has no wheezes.   Abdominal: Soft. Bowel sounds are normal. She exhibits no distension. There is no tenderness. There is no guarding.   Musculoskeletal: She exhibits edema and tenderness.   Right hand is TTP, swollen with contracture of R fingers and elbow, very limited active movement of R arm or hand, some passive ROM but limited by pain. Splint in place to right arm.      Neurological: She is alert and oriented to person, place, and time.   Skin: Skin is warm and dry. She is not diaphoretic.   Psychiatric: She has a normal mood and affect. Her behavior is normal. Thought content normal.   Nursing note and vitals reviewed.      Significant Labs: None in past 24 hours    Significant Imaging: I have reviewed all pertinent imaging results/findings within the past 24 hours.    Assessment/Plan:      * Immobility    Gait disturbance  Localized edema of right hand  Contracture of muscle, right upper arm  Weakness of both lower extremities    - Condition unchanged since admit.   - Patient with progressive immobility and weak over  "past few months starting five months ago, worsening over past two months. Patient with flexion contracture of right hand, along with right hand edema and weakness as well as "tingling" pain in right elbow over the past few months that has recently worsened. Has seen orthopedics as outpatient with unremarkable Radiology.  - EMG/NCV ordered on 12/18 to further evaluate cause of RUE contractures and leg weakness as per Neurology recs but Neurology states can be done as outpatient as low suspicion for motor neuron disease.  - PT/OT consulted and evaluated patient and following - OT recommending SNF and  working with family and patient on choices. Patient not IP rehab candidate.   - MRI brain done showed "No evidence of acute intracranial pathology"  - US of RUE showed "No thrombus in central veins of the right upper extremity."  - MRI of cervical spine "Mild DJD of C-spine but no stenosis". Repeat MRI of thoracic and cervical spine on 12/19 with contrast with no stenosis or significant disc disease so no structural cause for right arm or leg weakness.   - CPK normal at 61  - Neurology following and appreciate recs and state no further inpatient evaluation needed and EMG/NCV can be done as outpatient and has EMG/NCV ordered for 2/8/2019.   - Pain medication PRN and topical capsaicin to right upper extremity. Pain related to contracture sand improved with placement of right arm splint.   - Edema to right hand and arm related to immobility and improved with splint.          Essential hypertension    · Patient's blood pressure controlled.    · Goal for blood pressure is SBP < 140 and DBP < 90 as patient > or = 60 years of age and patient is diabetic based on JNC 8 guidelines.   · Norvasc 10 mg po daily stopped ( new med started on this admit for HTN) on 12/22 as patient with SBP 93. Patient continued on home meds of Lisinopril 40 mg po daily (home med) and Coreg 25 mg po BID (home med) to treat and BP has been " "stable on this regimen after Norvasc stopped on 12/22 with no further hypotension. Patient to be discharge to NH on this regimen.  · Plan is to monitor patient's blood pressure routinely while patient is hospitalized.      Type 2 diabetes mellitus without complication, without long-term current use of insulin    Controlled in past 24 hours. Holding home meds of Sitagliptin and Metformin while patient in hospital and treating with insulin but plan to resume on discharge to NH SNF.     Blood Sugars (AccuCheck):    Recent Labs     12/24/18  0845 12/24/18  1247 12/24/18  1756 12/24/18  1757 12/24/18  2023 12/25/18  0735   POCTGLUCOSE 106 122* 59* 61* 212* 112*       · Last HgA1C 6 % and at goal.  · Plan is to continue Levemir 6 units twice a day and Novolog 4 units with meals to treat in hospital but will discharge on Metformin and Sitagliptin on hospital discharge.  · Plan is to continue to monitor POCT glucose 4 times a day with each meal and at bedtime and cover with Novolog low dose sliding scale insulin.   · Plan is to continue 1500 cat diabetic diet in the hospital.   · Target pre-meal glucose goal is <140 with all random glucoses <180 in non-critically ill patient.     Vitamin B12 deficiency anemia    - Controlled and stable.   - 11/23: B12 175, Ferritin 90, Transferrin 185, Sat Fe: 13, Fe: 35, TIBC: 274  - Patient started on B12 supplement at Vitamin B12 1000 mcg po daily as outpatient in 11/2018 and repeat Vitamin B12 level on this admit 1062. Patient to continue oral Vitamin B12 supplementation on hospital discharge.   - Folate level normal.      Pure hypercholesterolemia    Controlled. Continue Lipitor 40 mg po daily to treat on discharge.      Localized edema    R hand edema and weakness, pain improved  Unclear exact etiology  MRI brain ana maria howed "No evidence of acute intracranial pathology"  US showed "No thrombus in central veins of the right upper extremity."  Pt was supposed to have outpt EMG done by " neurology  PT and OT  Pain medication PRN  Topical capsaicin        VTE Risk Mitigation (From admission, onward)        Ordered     enoxaparin injection 40 mg  Daily      12/18/18 1631     Place SOHEILA hose  Until discontinued      12/14/18 1741     IP VTE HIGH RISK PATIENT  Once      12/14/18 1741              Tessie Mai MD  Department of Hospital Medicine   [facility]

## 2018-12-28 NOTE — PROGRESS NOTES
"[facility] Hospital Medicine  Progress Note    Patient Name: Deisi Cheung  MRN: [id number]  Patient Class: OP- Observation   Admission Date: 12/14/2018  Length of Stay: 0 days  Attending Physician: Tessie Mai MD  Primary Care Provider: Santos Mena MD    [facility] Team: [facility]  Tessie Mai MD    Subjective:     Principal Problem:Immobility    HPI:  84 YO lady with HTN, HLD, Type 2 diabetes on oral hypoglycemics and osteoarthritis who presented with complaint of persistant right arm weakness and right hand swelling for past 5 months. She denies injury or trauma. She has weakness of the entire RUE. She has difficulty with active shoulder, elbow, wrist, and finger motion. She has flexion contracture of the fingers. She notes edema of the hand which is improved with elevation. She is able to passively extend them to some degree however limited by pain.  She denies notable numbness or tingling. Confusion, trauma, swelling in any other extremities any rashes, fever or chills, neck pain,  hx of known CVA. She was evaluated by Orthopedics in past with  prior x-rays which are unremarkable. So she was sent to Neurology for further evaluation and recommended an EMG. On presentation to ED, she had an MRI brain done that showed " No evidence of acute intracranial pathology.Moderate generalized cerebral volume loss and moderate chronic microvascular ischemic changes" and US of RUE that showed "No thrombus in central veins of the right upper extremity."   Of note pt has been bed bound and not ambulatory for months (no clear inciting events) she has worked with PT and OT in past for her knee pain but has progressively has become bed bound and relies on her family for her ADLs. Family reports that they can no longer meet all her need and are requesting admission for placement to nursing home.     Hospital Course:  Mrs. Cheung was admitted for nursing home placement and Neurology " evaluation as to cause of progressive immobility and loss of use of right arm and leg weakness. H/H at baseline with anemia. B12 low, started on supplement. Patient hypertensive in absence of pain and started on Norvasc 10 mg po daily to treat. Neurology consulted, MRI of C-spine ordered and returned with only mild DJD of cervical spine but no spinal stenosis and does not explain patient's neurologic symptoms. EMG and NCV ordered on 12/18 to further work-up cause of progressive weakness. PT/OT consulted, recommended SNF placement and  working with family on placement choices. Pain to right arm improved with placement of splint to right hand and forearm to help with contractures. CPK normal at 61. Repeat MRI of thoracic and cervical spine with contrast done on 12/19 as per Neurology recs to make sure no structural cause of neurologic symptoms and repeat MRI unremarkable for any significant disc disease or spinal stenosis to explain patient's neurologic symptoms. Neurology states NCV/EMG can be done as outpatient as low suspicion for motor neuron disease.     Interval History:   Patient medically ready for discharge, awaiting placement.  PHN denied SNF as pt is at baseline. Will need [facility] nursing home or home care with home health and 24 hour care. Social work to discuss with family.     Review of Systems   Constitutional: Negative for fever.   Respiratory: Negative for cough and shortness of breath.    Cardiovascular: Negative for chest pain.   Gastrointestinal: Negative for constipation, diarrhea and nausea.   Genitourinary: Negative for dysuria, frequency and hematuria.   Musculoskeletal: Positive for arthralgias and myalgias (Right arm).   Skin: Negative for rash.   Neurological: Positive for weakness (RUE and both legs ). Negative for numbness and headaches.   Psychiatric/Behavioral: Negative for agitation and confusion.     Objective:     Vital Signs (Most Recent):  Temp: 98.2 °F (36.8 °C)  (12/28/18 0837)  Pulse: 70 (12/28/18 0837)  Resp: 18 (12/28/18 0837)  BP: (!) 162/83 (12/28/18 0837)  SpO2: 98 % (12/28/18 0837) Vital Signs (24h Range):  Temp:  [98.2 °F (36.8 °C)] 98.2 °F (36.8 °C)  Pulse:  [57-70] 70  Resp:  [18] 18  SpO2:  [98 %-100 %] 98 %  BP: (157-162)/(71-83) 162/83     Weight: 50.9 kg (112 lb 3.4 oz)  Body mass index is 20.52 kg/m².  No intake or output data in the 24 hours ending 12/28/18 1127   Physical Exam   Constitutional: She is oriented to person, place, and time. She appears well-developed. No distress.   Frail elderly lady   HENT:   Head: Normocephalic and atraumatic.   Eyes: No scleral icterus.   Neck: Neck supple.   Cardiovascular: Normal rate, regular rhythm and normal heart sounds. Exam reveals no gallop and no friction rub.   No murmur heard.  Pulmonary/Chest: Effort normal and breath sounds normal. No respiratory distress. She has no wheezes.   Abdominal: Soft. Bowel sounds are normal. She exhibits no distension. There is no tenderness. There is no guarding.   Musculoskeletal: She exhibits edema and tenderness.   Right hand is TTP, swollen with contracture of R fingers and elbow, very limited active movement of R arm or hand, some passive ROM but limited by pain. Splint in place to right arm.      Neurological: She is alert and oriented to person, place, and time.   Skin: Skin is warm and dry. She is not diaphoretic.   Psychiatric: She has a normal mood and affect. Her behavior is normal. Thought content normal.   Nursing note and vitals reviewed.      Significant Labs: None in past 24 hours    Significant Imaging: I have reviewed all pertinent imaging results/findings within the past 24 hours.    Assessment/Plan:      * Immobility    Gait disturbance  Localized edema of right hand  Contracture of muscle, right upper arm  Weakness of both lower extremities    - Condition unchanged since admit.   - Patient with progressive immobility and weak over past few months starting five  "months ago, worsening over past two months. Patient with flexion contracture of right hand, along with right hand edema and weakness as well as "tingling" pain in right elbow over the past few months that has recently worsened. Has seen orthopedics as outpatient with unremarkable Radiology.  - EMG/NCV ordered on 12/18 to further evaluate cause of RUE contractures and leg weakness as per Neurology recs but Neurology states can be done as outpatient as low suspicion for motor neuron disease.  - PT/OT consulted and evaluated patient and following - OT recommending SNF and  working with family and patient on choices. Patient not IP rehab candidate.   - MRI brain done showed "No evidence of acute intracranial pathology"  - US of RUE showed "No thrombus in central veins of the right upper extremity."  - MRI of cervical spine "Mild DJD of C-spine but no stenosis". Repeat MRI of thoracic and cervical spine on 12/19 with contrast with no stenosis or significant disc disease so no structural cause for right arm or leg weakness.   - CPK normal at 61  - Neurology following and appreciate recs and state no further inpatient evaluation needed and EMG/NCV can be done as outpatient and has EMG/NCV ordered for 2/8/2019.   - Pain medication PRN and topical capsaicin to right upper extremity. Pain related to contracture sand improved with placement of right arm splint.   - Edema to right hand and arm related to immobility and improved with splint.          Essential hypertension    · Patient's blood pressure controlled.    · Goal for blood pressure is SBP < 140 and DBP < 90 as patient > or = 60 years of age and patient is diabetic based on JNC 8 guidelines.   · Norvasc 10 mg po daily stopped ( new med started on this admit for HTN) on 12/22 as patient with SBP 93. Patient continued on home meds of Lisinopril 40 mg po daily (home med) and Coreg 25 mg po BID (home med) to treat and BP has been stable on this regimen after " "Norvasc stopped on 12/22 with no further hypotension. Patient to be discharge to NH on this regimen.  · Plan is to monitor patient's blood pressure routinely while patient is hospitalized.      Type 2 diabetes mellitus without complication, without long-term current use of insulin    Controlled in past 24 hours. Holding home meds of Sitagliptin and Metformin while patient in hospital and treating with insulin but plan to resume on discharge to NH SNF.     Blood Sugars (AccuCheck):    Recent Labs     12/24/18  0845 12/24/18  1247 12/24/18  1756 12/24/18  1757 12/24/18  2023 12/25/18  0735   POCTGLUCOSE 106 122* 59* 61* 212* 112*       · Last HgA1C 6 % and at goal.  · Plan is to continue Levemir 6 units twice a day and Novolog 4 units with meals to treat in hospital but will discharge on Metformin and Sitagliptin on hospital discharge.  · Plan is to continue to monitor POCT glucose 4 times a day with each meal and at bedtime and cover with Novolog low dose sliding scale insulin.   · Plan is to continue 1500 cat diabetic diet in the hospital.   · Target pre-meal glucose goal is <140 with all random glucoses <180 in non-critically ill patient.     Vitamin B12 deficiency anemia    - Controlled and stable.   - 11/23: B12 175, Ferritin 90, Transferrin 185, Sat Fe: 13, Fe: 35, TIBC: 274  - Patient started on B12 supplement at Vitamin B12 1000 mcg po daily as outpatient in 11/2018 and repeat Vitamin B12 level on this admit 1062. Patient to continue oral Vitamin B12 supplementation on hospital discharge.   - Folate level normal.      Pure hypercholesterolemia    Controlled. Continue Lipitor 40 mg po daily to treat on discharge.      Localized edema    R hand edema and weakness, pain improved  Unclear exact etiology  MRI brain ana maria howed "No evidence of acute intracranial pathology"  US showed "No thrombus in central veins of the right upper extremity."  Pt was supposed to have outpt EMG done by neurology  PT and OT  Pain " medication PRN  Topical capsaicin        VTE Risk Mitigation (From admission, onward)        Ordered     enoxaparin injection 40 mg  Daily      12/18/18 1631     Place SOHEILA hose  Until discontinued      12/14/18 1741     IP VTE HIGH RISK PATIENT  Once      12/14/18 1741              Tessie Mai MD  Department of Hospital Medicine   [facility]

## 2018-12-28 NOTE — PLAN OF CARE
CASTILLO following for DC needs. CASTILLO in communication with CM.    Patient still denied SNF after peer to peer.     CASTILLO spoke to Kayli, patient's daughter in law, this morning and notified her that PHN has denied SNF. CASTILLO informed Kayli that the only options at this time are to take the patient home if they can care for her at home or for the patient to go to a nursing home as a intermediate resident, if she qualifies for long term medicaid.     Kayli is calling the patient's son to discuss the above information and will call CASTILLO back.     Maria Del Rosario Umanzor, WILSON  [facility]  [id number]

## 2018-12-28 NOTE — SUBJECTIVE & OBJECTIVE
Interval History:   Patient medically ready for discharge, awaiting placement.  PHN denied SNF as pt is at baseline. Will need group home nursing home or home care with home health and 24 hour care. Social work to discuss with family.     Review of Systems   Constitutional: Negative for fever.   Respiratory: Negative for cough and shortness of breath.    Cardiovascular: Negative for chest pain.   Gastrointestinal: Negative for constipation, diarrhea and nausea.   Genitourinary: Negative for dysuria, frequency and hematuria.   Musculoskeletal: Positive for arthralgias and myalgias (Right arm).   Skin: Negative for rash.   Neurological: Positive for weakness (RUE and both legs ). Negative for numbness and headaches.   Psychiatric/Behavioral: Negative for agitation and confusion.     Objective:     Vital Signs (Most Recent):  Temp: 98.2 °F (36.8 °C) (12/28/18 0837)  Pulse: 70 (12/28/18 0837)  Resp: 18 (12/28/18 0837)  BP: (!) 162/83 (12/28/18 0837)  SpO2: 98 % (12/28/18 0837) Vital Signs (24h Range):  Temp:  [98.2 °F (36.8 °C)] 98.2 °F (36.8 °C)  Pulse:  [57-70] 70  Resp:  [18] 18  SpO2:  [98 %-100 %] 98 %  BP: (157-162)/(71-83) 162/83     Weight: 50.9 kg (112 lb 3.4 oz)  Body mass index is 20.52 kg/m².  No intake or output data in the 24 hours ending 12/28/18 1127   Physical Exam   Constitutional: She is oriented to person, place, and time. She appears well-developed. No distress.   Frail elderly lady   HENT:   Head: Normocephalic and atraumatic.   Eyes: No scleral icterus.   Neck: Neck supple.   Cardiovascular: Normal rate, regular rhythm and normal heart sounds. Exam reveals no gallop and no friction rub.   No murmur heard.  Pulmonary/Chest: Effort normal and breath sounds normal. No respiratory distress. She has no wheezes.   Abdominal: Soft. Bowel sounds are normal. She exhibits no distension. There is no tenderness. There is no guarding.   Musculoskeletal: She exhibits edema and tenderness.   Right hand is TTP,  swollen with contracture of R fingers and elbow, very limited active movement of R arm or hand, some passive ROM but limited by pain. Splint in place to right arm.      Neurological: She is alert and oriented to person, place, and time.   Skin: Skin is warm and dry. She is not diaphoretic.   Psychiatric: She has a normal mood and affect. Her behavior is normal. Thought content normal.   Nursing note and vitals reviewed.      Significant Labs: None in past 24 hours    Significant Imaging: I have reviewed all pertinent imaging results/findings within the past 24 hours.

## 2018-12-28 NOTE — PLAN OF CARE
Problem: Fall Injury Risk  Goal: Absence of Fall and Fall-Related Injury  Outcome: Ongoing (interventions implemented as appropriate)  Patient resting in bed, with family members at the bedside. Turn Q 2 during shift, with barrier cream application for pressure ulcer prevention. Bed in lowest position, SR up x2, call light in reach. Safety maintained.

## 2018-12-29 NOTE — PROGRESS NOTES
"[facility] Hospital Medicine  Progress Note    Patient Name: Deisi Cheung  MRN: [id number]  Patient Class: OP- Observation   Admission Date: 12/14/2018  Length of Stay: 0 days  Attending Physician: Tessie Mai MD  Primary Care Provider: Santos Mena MD    [facility]: [facility]OSP MED  Tessie Mai MD    Subjective:     Principal Problem:Immobility    HPI:  84 YO lady with HTN, HLD, Type 2 diabetes on oral hypoglycemics and osteoarthritis who presented with complaint of persistant right arm weakness and right hand swelling for past 5 months. She denies injury or trauma. She has weakness of the entire RUE. She has difficulty with active shoulder, elbow, wrist, and finger motion. She has flexion contracture of the fingers. She notes edema of the hand which is improved with elevation. She is able to passively extend them to some degree however limited by pain.  She denies notable numbness or tingling. Confusion, trauma, swelling in any other extremities any rashes, fever or chills, neck pain,  hx of known CVA. She was evaluated by Orthopedics in past with  prior x-rays which are unremarkable. So she was sent to Neurology for further evaluation and recommended an EMG. On presentation to ED, she had an MRI brain done that showed " No evidence of acute intracranial pathology.Moderate generalized cerebral volume loss and moderate chronic microvascular ischemic changes" and US of RUE that showed "No thrombus in central veins of the right upper extremity."   Of note pt has been bed bound and not ambulatory for months (no clear inciting events) she has worked with PT and OT in past for her knee pain but has progressively has become bed bound and relies on her family for her ADLs. Family reports that they can no longer meet all her need and are requesting admission for placement to nursing home.     Hospital Course:  Mrs. Cheung was admitted for nursing home placement and Neurology " evaluation as to cause of progressive immobility and loss of use of right arm and leg weakness. H/H at baseline with anemia. B12 low, started on supplement. Patient hypertensive in absence of pain and started on Norvasc 10 mg po daily to treat. Neurology consulted, MRI of C-spine ordered and returned with only mild DJD of cervical spine but no spinal stenosis and does not explain patient's neurologic symptoms. EMG and NCV ordered on 12/18 to further work-up cause of progressive weakness. PT/OT consulted, recommended SNF placement and  working with family on placement choices. Pain to right arm improved with placement of splint to right hand and forearm to help with contractures. CPK normal at 61. Repeat MRI of thoracic and cervical spine with contrast done on 12/19 as per Neurology recs to make sure no structural cause of neurologic symptoms and repeat MRI unremarkable for any significant disc disease or spinal stenosis to explain patient's neurologic symptoms. Neurology states NCV/EMG can be done as outpatient as low suspicion for motor neuron disease.     Interval History:   Patient medically ready for discharge, awaiting placement.  PHN denied SNF as pt is at baseline. Will need residential nursing home or home care with home health and 24 hour care. Social work to discuss with family.     Review of Systems   Constitutional: Negative for fever.   Respiratory: Negative for cough and shortness of breath.    Cardiovascular: Negative for chest pain.   Gastrointestinal: Negative for constipation, diarrhea and nausea.   Genitourinary: Negative for dysuria, frequency and hematuria.   Musculoskeletal: Positive for arthralgias and myalgias (Right arm).   Skin: Negative for rash.   Neurological: Positive for weakness (RUE and both legs ). Negative for numbness and headaches.   Psychiatric/Behavioral: Negative for agitation and confusion.     Objective:     Vital Signs (Most Recent):  Temp: 98 °F (36.7 °C)  (12/29/18 0823)  Pulse: 66 (12/29/18 0823)  Resp: 18 (12/29/18 0823)  BP: (!) 153/78 (12/29/18 0823)  SpO2: 97 % (12/29/18 0823) Vital Signs (24h Range):  Temp:  [98 °F (36.7 °C)-98.8 °F (37.1 °C)] 98 °F (36.7 °C)  Pulse:  [61-69] 66  Resp:  [14-18] 18  SpO2:  [97 %-99 %] 97 %  BP: (124-153)/(68-78) 153/78     Weight: 50.9 kg (112 lb 3.4 oz)  Body mass index is 20.52 kg/m².  No intake or output data in the 24 hours ending 12/29/18 1130   Physical Exam   Constitutional: She is oriented to person, place, and time. She appears well-developed. No distress.   Frail elderly lady   HENT:   Head: Normocephalic and atraumatic.   Eyes: No scleral icterus.   Neck: Neck supple.   Cardiovascular: Normal rate, regular rhythm and normal heart sounds. Exam reveals no gallop and no friction rub.   No murmur heard.  Pulmonary/Chest: Effort normal and breath sounds normal. No respiratory distress. She has no wheezes.   Abdominal: Soft. Bowel sounds are normal. She exhibits no distension. There is no tenderness. There is no guarding.   Musculoskeletal: She exhibits edema and tenderness.   Right hand is TTP, swollen with contracture of R fingers and elbow, very limited active movement of R arm or hand, some passive ROM but limited by pain. Splint in place to right arm.      Neurological: She is alert and oriented to person, place, and time.   Skin: Skin is warm and dry. She is not diaphoretic.   Psychiatric: She has a normal mood and affect. Her behavior is normal. Thought content normal.   Nursing note and vitals reviewed.      Significant Labs: None in past 24 hours    Significant Imaging: I have reviewed all pertinent imaging results/findings within the past 24 hours.    Assessment/Plan:      * Immobility    Gait disturbance  Localized edema of right hand  Contracture of muscle, right upper arm  Weakness of both lower extremities    - Condition unchanged since admit.   - Patient with progressive immobility and weak over past few  "months starting five months ago, worsening over past two months. Patient with flexion contracture of right hand, along with right hand edema and weakness as well as "tingling" pain in right elbow over the past few months that has recently worsened. Has seen orthopedics as outpatient with unremarkable Radiology.  - EMG/NCV ordered on 12/18 to further evaluate cause of RUE contractures and leg weakness as per Neurology recs but Neurology states can be done as outpatient as low suspicion for motor neuron disease.  - PT/OT consulted and evaluated patient and following - OT recommending SNF and  working with family and patient on choices. Patient not IP rehab candidate.   - MRI brain done showed "No evidence of acute intracranial pathology"  - US of RUE showed "No thrombus in central veins of the right upper extremity."  - MRI of cervical spine "Mild DJD of C-spine but no stenosis". Repeat MRI of thoracic and cervical spine on 12/19 with contrast with no stenosis or significant disc disease so no structural cause for right arm or leg weakness.   - CPK normal at 61  - Neurology following and appreciate recs and state no further inpatient evaluation needed and EMG/NCV can be done as outpatient and has EMG/NCV ordered for 2/8/2019.   - Pain medication PRN and topical capsaicin to right upper extremity. Pain related to contracture sand improved with placement of right arm splint.   - Edema to right hand and arm related to immobility and improved with splint.          Essential hypertension    · Patient's blood pressure controlled.    · Goal for blood pressure is SBP < 140 and DBP < 90 as patient > or = 60 years of age and patient is diabetic based on JNC 8 guidelines.   · Norvasc 10 mg po daily stopped ( new med started on this admit for HTN) on 12/22 as patient with SBP 93. Patient continued on home meds of Lisinopril 40 mg po daily (home med) and Coreg 25 mg po BID (home med) to treat and BP has been stable on " "this regimen after Norvasc stopped on 12/22 with no further hypotension. Patient to be discharge to NH on this regimen.  · Plan is to monitor patient's blood pressure routinely while patient is hospitalized.      Type 2 diabetes mellitus without complication, without long-term current use of insulin    Controlled in past 24 hours. Holding home meds of Sitagliptin and Metformin while patient in hospital and treating with insulin but plan to resume on discharge to NH SNF.     Blood Sugars (AccuCheck):    Recent Labs     12/24/18  0845 12/24/18  1247 12/24/18  1756 12/24/18  1757 12/24/18  2023 12/25/18  0735   POCTGLUCOSE 106 122* 59* 61* 212* 112*       · Last HgA1C 6 % and at goal.  · Plan is to continue Levemir 6 units twice a day and Novolog 4 units with meals to treat in hospital but will discharge on Metformin and Sitagliptin on hospital discharge.  · Plan is to continue to monitor POCT glucose 4 times a day with each meal and at bedtime and cover with Novolog low dose sliding scale insulin.   · Plan is to continue 1500 cat diabetic diet in the hospital.   · Target pre-meal glucose goal is <140 with all random glucoses <180 in non-critically ill patient.     Vitamin B12 deficiency anemia    - Controlled and stable.   - 11/23: B12 175, Ferritin 90, Transferrin 185, Sat Fe: 13, Fe: 35, TIBC: 274  - Patient started on B12 supplement at Vitamin B12 1000 mcg po daily as outpatient in 11/2018 and repeat Vitamin B12 level on this admit 1062. Patient to continue oral Vitamin B12 supplementation on hospital discharge.   - Folate level normal.      Pure hypercholesterolemia    Controlled. Continue Lipitor 40 mg po daily to treat on discharge.      Localized edema    R hand edema and weakness, pain improved  Unclear exact etiology  MRI brain ana maria howed "No evidence of acute intracranial pathology"  US showed "No thrombus in central veins of the right upper extremity."  Pt was supposed to have outpt EMG done by " neurology  PT and OT  Pain medication PRN  Topical capsaicin        VTE Risk Mitigation (From admission, onward)        Ordered     enoxaparin injection 40 mg  Daily      12/18/18 1631     Place SOHEILA hose  Until discontinued      12/14/18 1741     IP VTE HIGH RISK PATIENT  Once      12/14/18 1741              Tessie Mai MD  Department of Hospital Medicine   [facility]

## 2018-12-29 NOTE — SUBJECTIVE & OBJECTIVE
Interval History:   Patient medically ready for discharge, awaiting placement.  PHN denied SNF as pt is at baseline. Will need care home nursing home or home care with home health and 24 hour care. Social work to discuss with family.     Review of Systems   Constitutional: Negative for fever.   Respiratory: Negative for cough and shortness of breath.    Cardiovascular: Negative for chest pain.   Gastrointestinal: Negative for constipation, diarrhea and nausea.   Genitourinary: Negative for dysuria, frequency and hematuria.   Musculoskeletal: Positive for arthralgias and myalgias (Right arm).   Skin: Negative for rash.   Neurological: Positive for weakness (RUE and both legs ). Negative for numbness and headaches.   Psychiatric/Behavioral: Negative for agitation and confusion.     Objective:     Vital Signs (Most Recent):  Temp: 98 °F (36.7 °C) (12/29/18 0823)  Pulse: 66 (12/29/18 0823)  Resp: 18 (12/29/18 0823)  BP: (!) 153/78 (12/29/18 0823)  SpO2: 97 % (12/29/18 0823) Vital Signs (24h Range):  Temp:  [98 °F (36.7 °C)-98.8 °F (37.1 °C)] 98 °F (36.7 °C)  Pulse:  [61-69] 66  Resp:  [14-18] 18  SpO2:  [97 %-99 %] 97 %  BP: (124-153)/(68-78) 153/78     Weight: 50.9 kg (112 lb 3.4 oz)  Body mass index is 20.52 kg/m².  No intake or output data in the 24 hours ending 12/29/18 1130   Physical Exam   Constitutional: She is oriented to person, place, and time. She appears well-developed. No distress.   Frail elderly lady   HENT:   Head: Normocephalic and atraumatic.   Eyes: No scleral icterus.   Neck: Neck supple.   Cardiovascular: Normal rate, regular rhythm and normal heart sounds. Exam reveals no gallop and no friction rub.   No murmur heard.  Pulmonary/Chest: Effort normal and breath sounds normal. No respiratory distress. She has no wheezes.   Abdominal: Soft. Bowel sounds are normal. She exhibits no distension. There is no tenderness. There is no guarding.   Musculoskeletal: She exhibits edema and tenderness.   Right  hand is TTP, swollen with contracture of R fingers and elbow, very limited active movement of R arm or hand, some passive ROM but limited by pain. Splint in place to right arm.      Neurological: She is alert and oriented to person, place, and time.   Skin: Skin is warm and dry. She is not diaphoretic.   Psychiatric: She has a normal mood and affect. Her behavior is normal. Thought content normal.   Nursing note and vitals reviewed.      Significant Labs: None in past 24 hours    Significant Imaging: I have reviewed all pertinent imaging results/findings within the past 24 hours.

## 2018-12-29 NOTE — PLAN OF CARE
Problem: Adult Inpatient Plan of Care  Goal: Plan of Care Review  Outcome: Ongoing (interventions implemented as appropriate)  Patient AAOX3 safety and infection precautions taken. Patient voices no c/o pain/discomfort at this time. Will cont to monitor.

## 2018-12-29 NOTE — PLAN OF CARE
Problem: Fall Injury Risk  Goal: Absence of Fall and Fall-Related Injury  Outcome: Ongoing (interventions implemented as appropriate)  Patient sitting up in chair, surrounded by family. No distress noted. Denies any needs during this time. Patient turn q2 during shift. Fall precautions in place. Safety maintained.

## 2018-12-30 NOTE — PROGRESS NOTES
"[facility] Hospital Medicine  Progress Note    Patient Name: Deisi Cheung  MRN: [id number]  Patient Class: OP- Observation   Admission Date: 12/14/2018  Length of Stay: 0 days  Attending Physician: Tessie Mai MD  Primary Care Provider: Santos Mena MD    [facility] Medicine Team: [facility] HOSP MED  Tessie Mai MD    Subjective:     Principal Problem:Immobility    HPI:  84 YO lady with HTN, HLD, Type 2 diabetes on oral hypoglycemics and osteoarthritis who presented with complaint of persistant right arm weakness and right hand swelling for past 5 months. She denies injury or trauma. She has weakness of the entire RUE. She has difficulty with active shoulder, elbow, wrist, and finger motion. She has flexion contracture of the fingers. She notes edema of the hand which is improved with elevation. She is able to passively extend them to some degree however limited by pain.  She denies notable numbness or tingling. Confusion, trauma, swelling in any other extremities any rashes, fever or chills, neck pain,  hx of known CVA. She was evaluated by Orthopedics in past with  prior x-rays which are unremarkable. So she was sent to Neurology for further evaluation and recommended an EMG. On presentation to ED, she had an MRI brain done that showed " No evidence of acute intracranial pathology.Moderate generalized cerebral volume loss and moderate chronic microvascular ischemic changes" and US of RUE that showed "No thrombus in central veins of the right upper extremity."   Of note pt has been bed bound and not ambulatory for months (no clear inciting events) she has worked with PT and OT in past for her knee pain but has progressively has become bed bound and relies on her family for her ADLs. Family reports that they can no longer meet all her need and are requesting admission for placement to nursing home.     Hospital Course:  Mrs. Cheung was admitted for nursing home placement and Neurology " evaluation as to cause of progressive immobility and loss of use of right arm and leg weakness. H/H at baseline with anemia. B12 low, started on supplement. Patient hypertensive in absence of pain and started on Norvasc 10 mg po daily to treat. Neurology consulted, MRI of C-spine ordered and returned with only mild DJD of cervical spine but no spinal stenosis and does not explain patient's neurologic symptoms. EMG and NCV ordered on 12/18 to further work-up cause of progressive weakness. PT/OT consulted, recommended SNF placement and  working with family on placement choices. Pain to right arm improved with placement of splint to right hand and forearm to help with contractures. CPK normal at 61. Repeat MRI of thoracic and cervical spine with contrast done on 12/19 as per Neurology recs to make sure no structural cause of neurologic symptoms and repeat MRI unremarkable for any significant disc disease or spinal stenosis to explain patient's neurologic symptoms. Neurology states NCV/EMG can be done as outpatient as low suspicion for motor neuron disease.     Interval History:   Patient medically ready for discharge, awaiting placement.  PHN denied SNF as pt is at baseline. Will need [facility] nursing home or home care with home health and 24 hour care. Social work to discuss with family.     Review of Systems   Constitutional: Negative for fever.   Respiratory: Negative for cough and shortness of breath.    Cardiovascular: Negative for chest pain.   Gastrointestinal: Negative for constipation, diarrhea and nausea.   Genitourinary: Negative for dysuria, frequency and hematuria.   Musculoskeletal: Positive for arthralgias and myalgias (Right arm).   Skin: Negative for rash.   Neurological: Positive for weakness (RUE and both legs ). Negative for numbness and headaches.   Psychiatric/Behavioral: Negative for agitation and confusion.     Objective:     Vital Signs (Most Recent):  Temp: 97.7 °F (36.5 °C)  (12/30/18 0740)  Pulse: 63 (12/30/18 1256)  Resp: 15 (12/30/18 1256)  BP: 139/73 (12/30/18 1256)  SpO2: 97 % (12/30/18 1256) Vital Signs (24h Range):  Temp:  [97.1 °F (36.2 °C)-98.5 °F (36.9 °C)] 97.7 °F (36.5 °C)  Pulse:  [60-76] 63  Resp:  [15-18] 15  SpO2:  [95 %-100 %] 97 %  BP: (135-145)/(65-73) 139/73     Weight: 50.9 kg (112 lb 3.4 oz)  Body mass index is 20.52 kg/m².  No intake or output data in the 24 hours ending 12/30/18 1304   Physical Exam   Constitutional: She is oriented to person, place, and time. She appears well-developed. No distress.   Frail elderly lady   HENT:   Head: Normocephalic and atraumatic.   Eyes: No scleral icterus.   Neck: Neck supple.   Cardiovascular: Normal rate, regular rhythm and normal heart sounds. Exam reveals no gallop and no friction rub.   No murmur heard.  Pulmonary/Chest: Effort normal and breath sounds normal. No respiratory distress. She has no wheezes.   Abdominal: Soft. Bowel sounds are normal. She exhibits no distension. There is no tenderness. There is no guarding.   Musculoskeletal: She exhibits edema and tenderness.   Right hand is TTP, swollen with contracture of R fingers and elbow, very limited active movement of R arm or hand, some passive ROM but limited by pain. Splint in place to right arm.      Neurological: She is alert and oriented to person, place, and time.   Skin: Skin is warm and dry. She is not diaphoretic.   Psychiatric: She has a normal mood and affect. Her behavior is normal. Thought content normal.   Nursing note and vitals reviewed.      Significant Labs: None in past 24 hours    Significant Imaging: I have reviewed all pertinent imaging results/findings within the past 24 hours.    Assessment/Plan:      * Immobility    Gait disturbance  Localized edema of right hand  Contracture of muscle, right upper arm  Weakness of both lower extremities    - Condition unchanged since admit.   - Patient with progressive immobility and weak over past few  "months starting five months ago, worsening over past two months. Patient with flexion contracture of right hand, along with right hand edema and weakness as well as "tingling" pain in right elbow over the past few months that has recently worsened. Has seen orthopedics as outpatient with unremarkable Radiology.  - EMG/NCV ordered on 12/18 to further evaluate cause of RUE contractures and leg weakness as per Neurology recs but Neurology states can be done as outpatient as low suspicion for motor neuron disease.  - PT/OT consulted and evaluated patient and following - OT recommending SNF and  working with family and patient on choices. Patient not IP rehab candidate.   - MRI brain done showed "No evidence of acute intracranial pathology"  - US of RUE showed "No thrombus in central veins of the right upper extremity."  - MRI of cervical spine "Mild DJD of C-spine but no stenosis". Repeat MRI of thoracic and cervical spine on 12/19 with contrast with no stenosis or significant disc disease so no structural cause for right arm or leg weakness.   - CPK normal at 61  - Neurology following and appreciate recs and state no further inpatient evaluation needed and EMG/NCV can be done as outpatient and has EMG/NCV ordered for 2/8/2019.   - Pain medication PRN and topical capsaicin to right upper extremity. Pain related to contracture sand improved with placement of right arm splint.   - Edema to right hand and arm related to immobility and improved with splint.          Essential hypertension    · Patient's blood pressure controlled.    · Goal for blood pressure is SBP < 140 and DBP < 90 as patient > or = 60 years of age and patient is diabetic based on JNC 8 guidelines.   · Norvasc 10 mg po daily stopped ( new med started on this admit for HTN) on 12/22 as patient with SBP 93. Patient continued on home meds of Lisinopril 40 mg po daily (home med) and Coreg 25 mg po BID (home med) to treat and BP has been stable on " "this regimen after Norvasc stopped on 12/22 with no further hypotension. Patient to be discharge to NH on this regimen.  · Plan is to monitor patient's blood pressure routinely while patient is hospitalized.      Type 2 diabetes mellitus without complication, without long-term current use of insulin    Controlled in past 24 hours. Holding home meds of Sitagliptin and Metformin while patient in hospital and treating with insulin but plan to resume on discharge to NH SNF.     Blood Sugars (AccuCheck):    Recent Labs     12/24/18  0845 12/24/18  1247 12/24/18  1756 12/24/18  1757 12/24/18  2023 12/25/18  0735   POCTGLUCOSE 106 122* 59* 61* 212* 112*       · Last HgA1C 6 % and at goal.  · Plan is to continue Levemir 6 units twice a day and Novolog 4 units with meals to treat in hospital but will discharge on Metformin and Sitagliptin on hospital discharge.  · Plan is to continue to monitor POCT glucose 4 times a day with each meal and at bedtime and cover with Novolog low dose sliding scale insulin.   · Plan is to continue 1500 cat diabetic diet in the hospital.   · Target pre-meal glucose goal is <140 with all random glucoses <180 in non-critically ill patient.     Vitamin B12 deficiency anemia    - Controlled and stable.   - 11/23: B12 175, Ferritin 90, Transferrin 185, Sat Fe: 13, Fe: 35, TIBC: 274  - Patient started on B12 supplement at Vitamin B12 1000 mcg po daily as outpatient in 11/2018 and repeat Vitamin B12 level on this admit 1062. Patient to continue oral Vitamin B12 supplementation on hospital discharge.   - Folate level normal.      Pure hypercholesterolemia    Controlled. Continue Lipitor 40 mg po daily to treat on discharge.      Localized edema    R hand edema and weakness, pain improved  Unclear exact etiology  MRI brain ana maria howed "No evidence of acute intracranial pathology"  US showed "No thrombus in central veins of the right upper extremity."  Pt was supposed to have outpt EMG done by " neurology  PT and OT  Pain medication PRN  Topical capsaicin        VTE Risk Mitigation (From admission, onward)        Ordered     enoxaparin injection 40 mg  Daily      12/18/18 1631     Place SOHEILA hose  Until discontinued      12/14/18 1741     IP VTE HIGH RISK PATIENT  Once      12/14/18 1741              Tessie Mai MD  Department of Hospital Medicine   [facility]

## 2018-12-30 NOTE — PLAN OF CARE
Problem: Adult Inpatient Plan of Care  Goal: Plan of Care Review  Outcome: Ongoing (interventions implemented as appropriate)  Patient AAOX4 safety and infection precautions taken. Patient voices no c/o pain/discomfort at this time. Will cont to monitor.

## 2018-12-30 NOTE — SUBJECTIVE & OBJECTIVE
Interval History:   Patient medically ready for discharge, awaiting placement.  PHN denied SNF as pt is at baseline. Will need [facility] nursing home or home care with home health and 24 hour care. Social work to discuss with family.     Review of Systems   Constitutional: Negative for fever.   Respiratory: Negative for cough and shortness of breath.    Cardiovascular: Negative for chest pain.   Gastrointestinal: Negative for constipation, diarrhea and nausea.   Genitourinary: Negative for dysuria, frequency and hematuria.   Musculoskeletal: Positive for arthralgias and myalgias (Right arm).   Skin: Negative for rash.   Neurological: Positive for weakness (RUE and both legs ). Negative for numbness and headaches.   Psychiatric/Behavioral: Negative for agitation and confusion.     Objective:     Vital Signs (Most Recent):  Temp: 97.7 °F (36.5 °C) (12/30/18 0740)  Pulse: 63 (12/30/18 1256)  Resp: 15 (12/30/18 1256)  BP: 139/73 (12/30/18 1256)  SpO2: 97 % (12/30/18 1256) Vital Signs (24h Range):  Temp:  [97.1 °F (36.2 °C)-98.5 °F (36.9 °C)] 97.7 °F (36.5 °C)  Pulse:  [60-76] 63  Resp:  [15-18] 15  SpO2:  [95 %-100 %] 97 %  BP: (135-145)/(65-73) 139/73     Weight: 50.9 kg (112 lb 3.4 oz)  Body mass index is 20.52 kg/m².  No intake or output data in the 24 hours ending 12/30/18 1304   Physical Exam   Constitutional: She is oriented to person, place, and time. She appears well-developed. No distress.   Frail elderly lady   HENT:   Head: Normocephalic and atraumatic.   Eyes: No scleral icterus.   Neck: Neck supple.   Cardiovascular: Normal rate, regular rhythm and normal heart sounds. Exam reveals no gallop and no friction rub.   No murmur heard.  Pulmonary/Chest: Effort normal and breath sounds normal. No respiratory distress. She has no wheezes.   Abdominal: Soft. Bowel sounds are normal. She exhibits no distension. There is no tenderness. There is no guarding.   Musculoskeletal: She exhibits edema and tenderness.    Right hand is TTP, swollen with contracture of R fingers and elbow, very limited active movement of R arm or hand, some passive ROM but limited by pain. Splint in place to right arm.      Neurological: She is alert and oriented to person, place, and time.   Skin: Skin is warm and dry. She is not diaphoretic.   Psychiatric: She has a normal mood and affect. Her behavior is normal. Thought content normal.   Nursing note and vitals reviewed.      Significant Labs: None in past 24 hours    Significant Imaging: I have reviewed all pertinent imaging results/findings within the past 24 hours.

## 2018-12-30 NOTE — PLAN OF CARE
Problem: Adult Inpatient Plan of Care  Goal: Plan of Care Review  Outcome: Ongoing (interventions implemented as appropriate)  Received lying in bed AAOx4; NAD noted; VS remained stable throughout shift; RUE swollen with decreased ROM; elevated on pillow for support; medicated for pain as indicated by MAR: c/o constipation/painful BM's' MD notified new orders received; medicated with prn laxative with good results. Falls/safety/skin precautions maintained; freq weight shifting provided; assessment per flowsheet; meds administered per MAR    Plan to continue to monitor, pain management; Neuro, PT/OT following for immobility    Pending: NH placement

## 2018-12-30 NOTE — PT/OT/SLP PROGRESS
"Occupational Therapy   Treatment & Discharge    Name: Deisi Cheung  MRN: [id number]  Admitting Diagnosis:  Immobility       Recommendations:     Discharge Recommendations: nursing facility, basic(24 hour care )  Discharge Equipment Recommendations:  Hospital bed and ko lift  Barriers to discharge:  Decreased caregiver support, Other (Comment)(level of skilled assistance required)    Subjective    Reported "I wish I could just go home. I lay here all day with no one to really talk to"  Pain/Comfort:  · Pain Rating 1: (pain in R UE with movt)  · Pain Addressed 1: Cessation of Activity, Reposition  · Pain Rating Post-Intervention 1: 0/10    Objective:     Patient found supine in bed.    General Precautions: Standard, fall, diabetic   Orthopedic Precautions:N/A   Braces: N/A     Occupational Performance:  [facility] 6 Click ADL: 9   Body mass index is 20.52 kg/m².  Vitals:    12/30/18 1256   BP: 139/73   Pulse: 63   Resp: 15   Temp:        Treatment & Education:  -Pt alert and oriented x4  -Completed re edu of positioning for R UE and donning R UE splint while in supine  -Provided gentle ROM with end range stretch as tolerated (R)UE  -Assisted pt with meal (total assistance) 2/2 difficulty with ROM and sustained grasp with L UE  -Discussed no further OT needs in acute setting and for nursing to complete positioning and mobility as tolerated   -Pt able to verbalize understanding for wear and care for R UE splint  -Communication board updated; questions/concerns addressed within OT scope of practice    Patient left with bed in chair position with all lines intact and call button in reach  Education:    Assessment:     Deisi Cheung is a 83 y.o. female with a medical diagnosis of Immobility. She limited gains towards OT goals. She remains total(A)x2 persons/depdent for all forms of mobility. She would benefit benefit from 24 hour care at time of d/c. She would benefit from positioning schedule, ROM and mobility as tolerated " from nursing staff at this time.     Rehab Prognosis: Limited     Plan:     Patient d/c from acute OT services at this time. Pt with mobility and positioning orders for nursing staff at this time.     GOALS:   Multidisciplinary Problems     Occupational Therapy Goals     Not on file          Multidisciplinary Problems (Resolved)        Problem: Occupational Therapy Goal    Goal Priority Disciplines Outcome Interventions   Occupational Therapy Goal   (Resolved)     OT, PT/OT Outcome(s) achieved    Description:  Goals to be met by: 12/3 ( goals updated 12/24)    Patient will increase functional independence with ADLs by performing:    UE Dressing with maximum assistance. Not met  Grooming while seated with Maximum assistance. Not met  Supine to sit with Maximum Assistance. Not met  Squat pivot transfers to/from wheelchair with Moderate Assistance. Not met  Pt verbalized understanding for wear schedule of R UE splint. met  Pt/CG demo understanding for postioning and ROM exercises for R UE. Not met                        Time Tracking:     OT Date of Treatment: 12/30/18  OT Start Time: 1047  OT Stop Time: 1100  OT Total Time (min): 13 min    Billable Minutes:Therapeutic Activity 13    CRISTIAN Kellogg  12/30/2018

## 2018-12-31 NOTE — SUBJECTIVE & OBJECTIVE
Interval History:   Patient medically ready for discharge, awaiting placement.  PHN denied SNF as pt is at baseline. Will need [facility] nursing home or home care with home health and 24 hour care. Social work working with family.     Review of Systems   Constitutional: Negative for fever.   Respiratory: Negative for cough and shortness of breath.    Cardiovascular: Negative for chest pain.   Gastrointestinal: Negative for constipation, diarrhea and nausea.   Genitourinary: Negative for dysuria, frequency and hematuria.   Musculoskeletal: Positive for arthralgias and myalgias (Right arm).   Skin: Negative for rash.   Neurological: Positive for weakness (RUE and both legs ). Negative for numbness and headaches.   Psychiatric/Behavioral: Negative for agitation and confusion.     Objective:     Vital Signs (Most Recent):  Temp: 99.2 °F (37.3 °C) (12/31/18 0759)  Pulse: 62 (12/31/18 0759)  Resp: 18 (12/31/18 0759)  BP: (!) 171/87 (12/31/18 0759)  SpO2: 98 % (12/31/18 0759) Vital Signs (24h Range):  Temp:  [97.4 °F (36.3 °C)-99.4 °F (37.4 °C)] 99.2 °F (37.3 °C)  Pulse:  [62-68] 62  Resp:  [15-18] 18  SpO2:  [96 %-98 %] 98 %  BP: (139-182)/(70-87) 171/87     Weight: 50.9 kg (112 lb 3.4 oz)  Body mass index is 20.52 kg/m².  No intake or output data in the 24 hours ending 12/31/18 1004   Physical Exam   Constitutional: She is oriented to person, place, and time. She appears well-developed. No distress.   Frail elderly lady   HENT:   Head: Normocephalic and atraumatic.   Eyes: No scleral icterus.   Neck: Neck supple.   Cardiovascular: Normal rate, regular rhythm and normal heart sounds. Exam reveals no gallop and no friction rub.   No murmur heard.  Pulmonary/Chest: Effort normal and breath sounds normal. No respiratory distress. She has no wheezes.   Abdominal: Soft. Bowel sounds are normal. She exhibits no distension. There is no tenderness. There is no guarding.   Musculoskeletal: She exhibits edema and tenderness.    Right hand is TTP, swollen with contracture of R fingers and elbow, very limited active movement of R arm or hand, some passive ROM but limited by pain. Splint in place to right arm.      Neurological: She is alert and oriented to person, place, and time.   Skin: Skin is warm and dry. She is not diaphoretic.   Psychiatric: She has a normal mood and affect. Her behavior is normal. Thought content normal.   Nursing note and vitals reviewed.      Significant Labs: None in past 24 hours    Significant Imaging: I have reviewed all pertinent imaging results/findings within the past 24 hours.

## 2018-12-31 NOTE — PLAN OF CARE
CASTILLO following for DC needs. CASTILLO in communication with AYESHA.    CASTILLO attempted to call [facility] ([phone]) to follow up on patient's placement. This phone number now says that it is out of service.      CASTILLO called patient's daughter Erum ([phone]). CASTILLO and Erum discussed that the patient was denied for SNF level of care. CASTILLO informed Erum that CASTILLO spoke to [facility] on Friday in regards to the patient being denied SNF. Erum stated that [facility] did tell her about this but they did not come to a conclusion about their plan. CASTILLO informed Erum that the options at this point are for the patient to return home with home health if the family is able to care for her or for the patient to go to a nursing home as a [facility] resident. CASTILLO explained that as a [facility] resident the patient would either be private pay or would have to qualify for long term medicaid. Erum stated that [facility] is out of town but she will try to talk to her and to her brother about their plan for the patient.     CASTILLO asked Erum to please call CASTILLO back once they speak and decide on a plan.     Maria Del Rosario Umanzor, LMSW [facility]  [id number]

## 2018-12-31 NOTE — ASSESSMENT & PLAN NOTE
"Gait disturbance  Localized edema of right hand  Contracture of muscle, right upper arm  Weakness of both lower extremities    - Condition unchanged since admit.   - Patient with progressive immobility and weak over past few months starting five months ago, worsening over past two months. Patient with flexion contracture of right hand, along with right hand edema and weakness as well as "tingling" pain in right elbow over the past few months that has recently worsened. Has seen orthopedics as outpatient with unremarkable Radiology.  - EMG/NCV ordered on 12/18 to further evaluate cause of RUE contractures and leg weakness as per Neurology recs but Neurology states can be done as outpatient as low suspicion for motor neuron disease.  - PT/OT consulted and evaluated patient and following - OT recommending SNF and  working with family and patient on choices. Patient not IP rehab candidate.   - MRI brain done showed "No evidence of acute intracranial pathology"  - US of RUE showed "No thrombus in central veins of the right upper extremity."  - MRI of cervical spine "Mild DJD of C-spine but no stenosis". Repeat MRI of thoracic and cervical spine on 12/19 with contrast with no stenosis or significant disc disease so no structural cause for right arm or leg weakness.   - CPK normal at 61  - Neurology following and appreciate recs and state no further inpatient evaluation needed and EMG/NCV can be done as outpatient and has EMG/NCV ordered for 2/8/2019.   - Pain medication PRN and topical capsaicin to right upper extremity. Pain related to contracture sand improved with placement of right arm splint.   - Edema to right hand and arm related to immobility and improved with splint.   - awaing NH versus home care      "

## 2018-12-31 NOTE — PROGRESS NOTES
"[facility] Hospital Medicine  Progress Note    Patient Name: Deisi Cheung  MRN: [id number]  Patient Class: OP- Observation   Admission Date: 12/14/2018  Length of Stay: 0 days  Attending Physician: Tessie Mai MD  Primary Care Provider: Santos Mena MD    [facility] Medicine Team: [facility] HOSP MED  Tessie Mai MD    Subjective:     Principal Problem:Immobility    HPI:  84 YO lady with HTN, HLD, Type 2 diabetes on oral hypoglycemics and osteoarthritis who presented with complaint of persistant right arm weakness and right hand swelling for past 5 months. She denies injury or trauma. She has weakness of the entire RUE. She has difficulty with active shoulder, elbow, wrist, and finger motion. She has flexion contracture of the fingers. She notes edema of the hand which is improved with elevation. She is able to passively extend them to some degree however limited by pain.  She denies notable numbness or tingling. Confusion, trauma, swelling in any other extremities any rashes, fever or chills, neck pain,  hx of known CVA. She was evaluated by Orthopedics in past with  prior x-rays which are unremarkable. So she was sent to Neurology for further evaluation and recommended an EMG. On presentation to ED, she had an MRI brain done that showed " No evidence of acute intracranial pathology.Moderate generalized cerebral volume loss and moderate chronic microvascular ischemic changes" and US of RUE that showed "No thrombus in central veins of the right upper extremity."   Of note pt has been bed bound and not ambulatory for months (no clear inciting events) she has worked with PT and OT in past for her knee pain but has progressively has become bed bound and relies on her family for her ADLs. Family reports that they can no longer meet all her need and are requesting admission for placement to nursing home.     Hospital Course:  Mrs. Cheung was admitted for nursing home placement and Neurology " evaluation as to cause of progressive immobility and loss of use of right arm and leg weakness. H/H at baseline with anemia. B12 low, started on supplement. Patient hypertensive in absence of pain and started on Norvasc 10 mg po daily to treat. Neurology consulted, MRI of C-spine ordered and returned with only mild DJD of cervical spine but no spinal stenosis and does not explain patient's neurologic symptoms. EMG and NCV ordered on 12/18 to further work-up cause of progressive weakness. PT/OT consulted, recommended SNF placement and  working with family on placement choices. Pain to right arm improved with placement of splint to right hand and forearm to help with contractures. CPK normal at 61. Repeat MRI of thoracic and cervical spine with contrast done on 12/19 as per Neurology recs to make sure no structural cause of neurologic symptoms and repeat MRI unremarkable for any significant disc disease or spinal stenosis to explain patient's neurologic symptoms. Neurology states NCV/EMG can be done as outpatient as low suspicion for motor neuron disease.     PT/OT evaluated the patient.  We tried for SNF placement, but due to the more chronic nature of her disease, PHN has denied SNF.  PEER to PEER done without change.  Pt will either need to go home with family and 24 hour care or [facility] nursing care.  Social work assisting.     Interval History:   Patient medically ready for discharge, awaiting placement.  PHN denied SNF as pt is at baseline. Will need [facility] nursing home or home care with home health and 24 hour care. Social work working with family.     Review of Systems   Constitutional: Negative for fever.   Respiratory: Negative for cough and shortness of breath.    Cardiovascular: Negative for chest pain.   Gastrointestinal: Negative for constipation, diarrhea and nausea.   Genitourinary: Negative for dysuria, frequency and hematuria.   Musculoskeletal: Positive for arthralgias and myalgias  (Right arm).   Skin: Negative for rash.   Neurological: Positive for weakness (RUE and both legs ). Negative for numbness and headaches.   Psychiatric/Behavioral: Negative for agitation and confusion.     Objective:     Vital Signs (Most Recent):  Temp: 99.2 °F (37.3 °C) (12/31/18 0759)  Pulse: 62 (12/31/18 0759)  Resp: 18 (12/31/18 0759)  BP: (!) 171/87 (12/31/18 0759)  SpO2: 98 % (12/31/18 0759) Vital Signs (24h Range):  Temp:  [97.4 °F (36.3 °C)-99.4 °F (37.4 °C)] 99.2 °F (37.3 °C)  Pulse:  [62-68] 62  Resp:  [15-18] 18  SpO2:  [96 %-98 %] 98 %  BP: (139-182)/(70-87) 171/87     Weight: 50.9 kg (112 lb 3.4 oz)  Body mass index is 20.52 kg/m².  No intake or output data in the 24 hours ending 12/31/18 1004   Physical Exam   Constitutional: She is oriented to person, place, and time. She appears well-developed. No distress.   Frail elderly lady   HENT:   Head: Normocephalic and atraumatic.   Eyes: No scleral icterus.   Neck: Neck supple.   Cardiovascular: Normal rate, regular rhythm and normal heart sounds. Exam reveals no gallop and no friction rub.   No murmur heard.  Pulmonary/Chest: Effort normal and breath sounds normal. No respiratory distress. She has no wheezes.   Abdominal: Soft. Bowel sounds are normal. She exhibits no distension. There is no tenderness. There is no guarding.   Musculoskeletal: She exhibits edema and tenderness.   Right hand is TTP, swollen with contracture of R fingers and elbow, very limited active movement of R arm or hand, some passive ROM but limited by pain. Splint in place to right arm.      Neurological: She is alert and oriented to person, place, and time.   Skin: Skin is warm and dry. She is not diaphoretic.   Psychiatric: She has a normal mood and affect. Her behavior is normal. Thought content normal.   Nursing note and vitals reviewed.      Significant Labs: None in past 24 hours    Significant Imaging: I have reviewed all pertinent imaging results/findings within the past 24  "hours.    Assessment/Plan:      * Immobility    Gait disturbance  Localized edema of right hand  Contracture of muscle, right upper arm  Weakness of both lower extremities    - Condition unchanged since admit.   - Patient with progressive immobility and weak over past few months starting five months ago, worsening over past two months. Patient with flexion contracture of right hand, along with right hand edema and weakness as well as "tingling" pain in right elbow over the past few months that has recently worsened. Has seen orthopedics as outpatient with unremarkable Radiology.  - EMG/NCV ordered on 12/18 to further evaluate cause of RUE contractures and leg weakness as per Neurology recs but Neurology states can be done as outpatient as low suspicion for motor neuron disease.  - PT/OT consulted and evaluated patient and following - OT recommending SNF and  working with family and patient on choices. Patient not IP rehab candidate.   - MRI brain done showed "No evidence of acute intracranial pathology"  - US of RUE showed "No thrombus in central veins of the right upper extremity."  - MRI of cervical spine "Mild DJD of C-spine but no stenosis". Repeat MRI of thoracic and cervical spine on 12/19 with contrast with no stenosis or significant disc disease so no structural cause for right arm or leg weakness.   - CPK normal at 61  - Neurology following and appreciate recs and state no further inpatient evaluation needed and EMG/NCV can be done as outpatient and has EMG/NCV ordered for 2/8/2019.   - Pain medication PRN and topical capsaicin to right upper extremity. Pain related to contracture sand improved with placement of right arm splint.   - Edema to right hand and arm related to immobility and improved with splint.   - awaing NH versus home care         Essential hypertension    · Patient's blood pressure controlled.    · Goal for blood pressure is SBP < 140 and DBP < 90 as patient > or = 60 years of " "age and patient is diabetic based on JNC 8 guidelines.   · Norvasc 10 mg po daily stopped ( new med started on this admit for HTN) on 12/22 as patient with SBP 93. Patient continued on home meds of Lisinopril 40 mg po daily (home med) and Coreg 25 mg po BID (home med) to treat and BP has been stable on this regimen after Norvasc stopped on 12/22 with no further hypotension. Patient to be discharge to NH on this regimen.  · Plan is to monitor patient's blood pressure routinely while patient is hospitalized.      Type 2 diabetes mellitus without complication, without long-term current use of insulin    Controlled in past 24 hours. Holding home meds of Sitagliptin and Metformin while patient in hospital and treating with insulin but plan to resume on discharge to NH SNF.         · Last HgA1C 6 % and at goal.  · Plan is to continue Levemir 6 units twice a day and Novolog 4 units with meals to treat in hospital but will discharge on Metformin and Sitagliptin on hospital discharge.  · Plan is to continue to monitor POCT glucose 4 times a day with each meal and at bedtime and cover with Novolog low dose sliding scale insulin.   · Plan is to continue 1500 cat diabetic diet in the hospital.   · Target pre-meal glucose goal is <140 with all random glucoses <180 in non-critically ill patient.     Vitamin B12 deficiency anemia    - Controlled and stable.   - 11/23: B12 175, Ferritin 90, Transferrin 185, Sat Fe: 13, Fe: 35, TIBC: 274  - Patient started on B12 supplement at Vitamin B12 1000 mcg po daily as outpatient in 11/2018 and repeat Vitamin B12 level on this admit 1062. Patient to continue oral Vitamin B12 supplementation on hospital discharge.   - Folate level normal.      Pure hypercholesterolemia    Controlled. Continue Lipitor 40 mg po daily to treat on discharge.      Localized edema    R hand edema and weakness, pain improved  Unclear exact etiology  MRI brain ana maria howed "No evidence of acute intracranial " "pathology"  US showed "No thrombus in central veins of the right upper extremity."  Pt was supposed to have outpt EMG done by neurology  PT and OT  Pain medication PRN  Topical capsaicin        VTE Risk Mitigation (From admission, onward)        Ordered     enoxaparin injection 40 mg  Daily      12/18/18 1631     Place SOHEILA hose  Until discontinued      12/14/18 1741     IP VTE HIGH RISK PATIENT  Once      12/14/18 1741              Tessie Mai MD  Department of Hospital Medicine   [facility]  "

## 2018-12-31 NOTE — PLAN OF CARE
Problem: Adult Inpatient Plan of Care  Goal: Plan of Care Review  Outcome: Ongoing (interventions implemented as appropriate)  Received lying in bed AAOx4; NAD noted; VS remained stable throughout shift; RUE swollen with decreased ROM; elevated on pillow for support; medicated for pain as indicated by MAR: 0 further c/o constipation; 2BM's this shift;  Falls/safety/skin precautions maintained; freq weight shifting provided; assessment per flowsheet; meds administered per MAR     Plan to continue to monitor, pain management; Neuro, PT/OT following for immobility     Pending: NH placement

## 2019-01-01 ENCOUNTER — TELEPHONE (OUTPATIENT)
Dept: PAIN MEDICINE | Facility: CLINIC | Age: 84
End: 2019-01-01

## 2019-01-01 ENCOUNTER — PATIENT MESSAGE (OUTPATIENT)
Dept: INTERNAL MEDICINE | Facility: CLINIC | Age: 84
End: 2019-01-01

## 2019-01-01 ENCOUNTER — OFFICE VISIT (OUTPATIENT)
Dept: NEUROLOGY | Facility: CLINIC | Age: 84
End: 2019-01-01
Payer: MEDICARE

## 2019-01-01 ENCOUNTER — PROCEDURE VISIT (OUTPATIENT)
Dept: NEUROLOGY | Facility: CLINIC | Age: 84
End: 2019-01-01
Payer: MEDICARE

## 2019-01-01 ENCOUNTER — PATIENT MESSAGE (OUTPATIENT)
Dept: NEUROLOGY | Facility: CLINIC | Age: 84
End: 2019-01-01

## 2019-01-01 ENCOUNTER — HOSPITAL ENCOUNTER (OUTPATIENT)
Facility: HOSPITAL | Age: 84
End: 2019-07-22
Attending: EMERGENCY MEDICINE | Admitting: INTERNAL MEDICINE
Payer: MEDICARE

## 2019-01-01 ENCOUNTER — OFFICE VISIT (OUTPATIENT)
Dept: PAIN MEDICINE | Facility: CLINIC | Age: 84
End: 2019-01-01
Attending: FAMILY MEDICINE
Payer: MEDICARE

## 2019-01-01 ENCOUNTER — OUTPATIENT CASE MANAGEMENT (OUTPATIENT)
Dept: ADMINISTRATIVE | Facility: OTHER | Age: 84
End: 2019-01-01

## 2019-01-01 ENCOUNTER — OFFICE VISIT (OUTPATIENT)
Dept: PAIN MEDICINE | Facility: CLINIC | Age: 84
End: 2019-01-01
Payer: MEDICARE

## 2019-01-01 ENCOUNTER — INITIAL CONSULT (OUTPATIENT)
Dept: RHEUMATOLOGY | Facility: CLINIC | Age: 84
End: 2019-01-01
Payer: MEDICARE

## 2019-01-01 VITALS
DIASTOLIC BLOOD PRESSURE: 67 MMHG | WEIGHT: 91 LBS | TEMPERATURE: 99 F | BODY MASS INDEX: 16.75 KG/M2 | SYSTOLIC BLOOD PRESSURE: 108 MMHG | HEART RATE: 74 BPM | HEIGHT: 62 IN | RESPIRATION RATE: 18 BRPM

## 2019-01-01 VITALS
SYSTOLIC BLOOD PRESSURE: 137 MMHG | HEART RATE: 90 BPM | HEIGHT: 62 IN | BODY MASS INDEX: 19.14 KG/M2 | DIASTOLIC BLOOD PRESSURE: 87 MMHG | WEIGHT: 104 LBS

## 2019-01-01 VITALS — DIASTOLIC BLOOD PRESSURE: 75 MMHG | SYSTOLIC BLOOD PRESSURE: 128 MMHG | HEART RATE: 78 BPM

## 2019-01-01 VITALS
HEART RATE: 72 BPM | DIASTOLIC BLOOD PRESSURE: 88 MMHG | RESPIRATION RATE: 18 BRPM | WEIGHT: 122 LBS | TEMPERATURE: 98 F | SYSTOLIC BLOOD PRESSURE: 131 MMHG | HEIGHT: 62 IN | BODY MASS INDEX: 22.45 KG/M2

## 2019-01-01 VITALS
BODY MASS INDEX: 15.11 KG/M2 | HEIGHT: 60 IN | HEART RATE: 61 BPM | TEMPERATURE: 98 F | OXYGEN SATURATION: 98 % | DIASTOLIC BLOOD PRESSURE: 60 MMHG | SYSTOLIC BLOOD PRESSURE: 89 MMHG | RESPIRATION RATE: 37 BRPM | WEIGHT: 76.94 LBS

## 2019-01-01 VITALS
WEIGHT: 112.19 LBS | BODY MASS INDEX: 20.65 KG/M2 | OXYGEN SATURATION: 96 % | SYSTOLIC BLOOD PRESSURE: 135 MMHG | RESPIRATION RATE: 17 BRPM | DIASTOLIC BLOOD PRESSURE: 79 MMHG | HEART RATE: 80 BPM | HEIGHT: 62 IN | TEMPERATURE: 97 F

## 2019-01-01 DIAGNOSIS — J96.02 ACUTE RESPIRATORY FAILURE WITH HYPOXIA AND HYPERCAPNIA: ICD-10-CM

## 2019-01-01 DIAGNOSIS — R22.31 LOCALIZED SWELLING ON RIGHT HAND: ICD-10-CM

## 2019-01-01 DIAGNOSIS — Z74.09 IMMOBILITY: Primary | ICD-10-CM

## 2019-01-01 DIAGNOSIS — M79.642 PAIN IN BOTH HANDS: ICD-10-CM

## 2019-01-01 DIAGNOSIS — R29.898 WEAKNESS OF RIGHT UPPER EXTREMITY: ICD-10-CM

## 2019-01-01 DIAGNOSIS — R29.898 WEAKNESS OF BOTH LOWER EXTREMITIES: ICD-10-CM

## 2019-01-01 DIAGNOSIS — Z74.09 IMMOBILITY: ICD-10-CM

## 2019-01-01 DIAGNOSIS — M79.603 PAIN OF UPPER EXTREMITY, UNSPECIFIED LATERALITY: Primary | ICD-10-CM

## 2019-01-01 DIAGNOSIS — N30.00 ACUTE CYSTITIS WITHOUT HEMATURIA: Primary | ICD-10-CM

## 2019-01-01 DIAGNOSIS — M24.50 FLEXION CONTRACTURE JOINT OF MULTIPLE SITES: Primary | ICD-10-CM

## 2019-01-01 DIAGNOSIS — R29.898 OTHER SYMPTOMS AND SIGNS INVOLVING THE MUSCULOSKELETAL SYSTEM: ICD-10-CM

## 2019-01-01 DIAGNOSIS — M79.641 PAIN IN BOTH HANDS: ICD-10-CM

## 2019-01-01 DIAGNOSIS — M24.549 CONTRACTURE OF HAND: Primary | ICD-10-CM

## 2019-01-01 DIAGNOSIS — M62.421 CONTRACTURE OF MUSCLE, RIGHT UPPER ARM: ICD-10-CM

## 2019-01-01 DIAGNOSIS — J96.01 ACUTE RESPIRATORY FAILURE WITH HYPOXIA AND HYPERCAPNIA: ICD-10-CM

## 2019-01-01 DIAGNOSIS — R53.81 DEBILITY: Primary | ICD-10-CM

## 2019-01-01 DIAGNOSIS — R06.02 SHORTNESS OF BREATH: ICD-10-CM

## 2019-01-01 DIAGNOSIS — M19.90 ARTHRITIS: Primary | ICD-10-CM

## 2019-01-01 LAB
ALBUMIN SERPL BCP-MCNC: 2.7 G/DL (ref 3.5–5.2)
ALBUMIN SERPL BCP-MCNC: 3.2 G/DL (ref 3.5–5.2)
ALLENS TEST: ABNORMAL
ALP SERPL-CCNC: 77 U/L (ref 55–135)
ALP SERPL-CCNC: 92 U/L (ref 55–135)
ALT SERPL W/O P-5'-P-CCNC: 10 U/L (ref 10–44)
ALT SERPL W/O P-5'-P-CCNC: 11 U/L (ref 10–44)
ANION GAP SERPL CALC-SCNC: 17 MMOL/L (ref 8–16)
ANION GAP SERPL CALC-SCNC: 17 MMOL/L (ref 8–16)
AST SERPL-CCNC: 15 U/L (ref 10–40)
AST SERPL-CCNC: 17 U/L (ref 10–40)
BACTERIA #/AREA URNS AUTO: ABNORMAL /HPF
BASOPHILS # BLD AUTO: 0.02 K/UL (ref 0–0.2)
BASOPHILS # BLD AUTO: 0.02 K/UL (ref 0–0.2)
BASOPHILS NFR BLD: 0.2 % (ref 0–1.9)
BASOPHILS NFR BLD: 0.2 % (ref 0–1.9)
BILIRUB SERPL-MCNC: 0.2 MG/DL (ref 0.1–1)
BILIRUB SERPL-MCNC: 0.3 MG/DL (ref 0.1–1)
BILIRUB UR QL STRIP: NEGATIVE
BNP SERPL-MCNC: 111 PG/ML (ref 0–99)
BUN SERPL-MCNC: 37 MG/DL (ref 8–23)
BUN SERPL-MCNC: 44 MG/DL (ref 8–23)
CALCIUM SERPL-MCNC: 8.7 MG/DL (ref 8.7–10.5)
CALCIUM SERPL-MCNC: 9.4 MG/DL (ref 8.7–10.5)
CHLORIDE SERPL-SCNC: 105 MMOL/L (ref 95–110)
CHLORIDE SERPL-SCNC: 99 MMOL/L (ref 95–110)
CLARITY UR REFRACT.AUTO: ABNORMAL
CO2 SERPL-SCNC: 26 MMOL/L (ref 23–29)
CO2 SERPL-SCNC: 28 MMOL/L (ref 23–29)
COLOR UR AUTO: ABNORMAL
CREAT SERPL-MCNC: 0.5 MG/DL
CREAT SERPL-MCNC: 0.6 MG/DL (ref 0.5–1.4)
CREAT SERPL-MCNC: 0.7 MG/DL (ref 0.5–1.4)
D DIMER PPP IA.FEU-MCNC: 6.01 MG/L FEU
DIFFERENTIAL METHOD: ABNORMAL
DIFFERENTIAL METHOD: ABNORMAL
EOSINOPHIL # BLD AUTO: 0 K/UL (ref 0–0.5)
EOSINOPHIL # BLD AUTO: 0 K/UL (ref 0–0.5)
EOSINOPHIL NFR BLD: 0 % (ref 0–8)
EOSINOPHIL NFR BLD: 0 % (ref 0–8)
ERYTHROCYTE [DISTWIDTH] IN BLOOD BY AUTOMATED COUNT: 14.4 % (ref 11.5–14.5)
ERYTHROCYTE [DISTWIDTH] IN BLOOD BY AUTOMATED COUNT: 14.6 % (ref 11.5–14.5)
EST. GFR  (AFRICAN AMERICAN): >60 ML/MIN/1.73 M^2
EST. GFR  (NON AFRICAN AMERICAN): >60 ML/MIN/1.73 M^2
GLUCOSE SERPL-MCNC: 124 MG/DL (ref 70–110)
GLUCOSE SERPL-MCNC: 152 MG/DL (ref 70–110)
GLUCOSE UR QL STRIP: NEGATIVE
HCO3 UR-SCNC: 27.8 MMOL/L (ref 24–28)
HCT VFR BLD AUTO: 40.1 % (ref 37–48.5)
HCT VFR BLD AUTO: 45.3 % (ref 37–48.5)
HGB BLD-MCNC: 11.7 G/DL (ref 12–16)
HGB BLD-MCNC: 13.5 G/DL (ref 12–16)
HGB UR QL STRIP: ABNORMAL
HYALINE CASTS UR QL AUTO: 0 /LPF
IMM GRANULOCYTES # BLD AUTO: 0.05 K/UL (ref 0–0.04)
IMM GRANULOCYTES # BLD AUTO: 0.06 K/UL (ref 0–0.04)
IMM GRANULOCYTES NFR BLD AUTO: 0.5 % (ref 0–0.5)
IMM GRANULOCYTES NFR BLD AUTO: 0.5 % (ref 0–0.5)
INFLUENZA A, MOLECULAR: NORMAL
INFLUENZA B, MOLECULAR: NORMAL
INR PPP: 1.2 (ref 0.8–1.2)
KETONES UR QL STRIP: ABNORMAL
LACTATE SERPL-SCNC: 1.3 MMOL/L (ref 0.5–2.2)
LACTATE SERPL-SCNC: 1.8 MMOL/L (ref 0.5–2.2)
LEUKOCYTE ESTERASE UR QL STRIP: NEGATIVE
LYMPHOCYTES # BLD AUTO: 1.7 K/UL (ref 1–4.8)
LYMPHOCYTES # BLD AUTO: 2 K/UL (ref 1–4.8)
LYMPHOCYTES NFR BLD: 16.6 % (ref 18–48)
LYMPHOCYTES NFR BLD: 17.7 % (ref 18–48)
MAGNESIUM SERPL-MCNC: 1.6 MG/DL (ref 1.6–2.6)
MAGNESIUM SERPL-MCNC: 1.7 MG/DL (ref 1.6–2.6)
MCH RBC QN AUTO: 27.1 PG (ref 27–31)
MCH RBC QN AUTO: 27.5 PG (ref 27–31)
MCHC RBC AUTO-ENTMCNC: 29.2 G/DL (ref 32–36)
MCHC RBC AUTO-ENTMCNC: 29.8 G/DL (ref 32–36)
MCV RBC AUTO: 92 FL (ref 82–98)
MCV RBC AUTO: 93 FL (ref 82–98)
MICROSCOPIC COMMENT: ABNORMAL
MONOCYTES # BLD AUTO: 0.4 K/UL (ref 0.3–1)
MONOCYTES # BLD AUTO: 0.6 K/UL (ref 0.3–1)
MONOCYTES NFR BLD: 4.2 % (ref 4–15)
MONOCYTES NFR BLD: 5 % (ref 4–15)
NEUTROPHILS # BLD AUTO: 8.2 K/UL (ref 1.8–7.7)
NEUTROPHILS # BLD AUTO: 8.6 K/UL (ref 1.8–7.7)
NEUTROPHILS NFR BLD: 76.6 % (ref 38–73)
NEUTROPHILS NFR BLD: 78.5 % (ref 38–73)
NITRITE UR QL STRIP: NEGATIVE
NRBC BLD-RTO: 0 /100 WBC
NRBC BLD-RTO: 0 /100 WBC
PCO2 BLDA: 53.8 MMHG (ref 35–45)
PH SMN: 7.32 [PH] (ref 7.35–7.45)
PH UR STRIP: 5 [PH] (ref 5–8)
PHOSPHATE SERPL-MCNC: 4.1 MG/DL (ref 2.7–4.5)
PLATELET # BLD AUTO: 348 K/UL (ref 150–350)
PLATELET # BLD AUTO: 376 K/UL (ref 150–350)
PMV BLD AUTO: 10.9 FL (ref 9.2–12.9)
PMV BLD AUTO: 11.1 FL (ref 9.2–12.9)
PO2 BLDA: 70 MMHG (ref 80–100)
POC BE: 2 MMOL/L
POC SATURATED O2: 92 % (ref 95–100)
POC TCO2: 29 MMOL/L (ref 23–27)
POCT GLUCOSE: 101 MG/DL (ref 70–110)
POCT GLUCOSE: 106 MG/DL (ref 70–110)
POCT GLUCOSE: 108 MG/DL (ref 70–110)
POCT GLUCOSE: 112 MG/DL (ref 70–110)
POCT GLUCOSE: 122 MG/DL (ref 70–110)
POCT GLUCOSE: 128 MG/DL (ref 70–110)
POCT GLUCOSE: 129 MG/DL (ref 70–110)
POCT GLUCOSE: 129 MG/DL (ref 70–110)
POCT GLUCOSE: 130 MG/DL (ref 70–110)
POCT GLUCOSE: 131 MG/DL (ref 70–110)
POCT GLUCOSE: 131 MG/DL (ref 70–110)
POCT GLUCOSE: 133 MG/DL (ref 70–110)
POCT GLUCOSE: 137 MG/DL (ref 70–110)
POCT GLUCOSE: 138 MG/DL (ref 70–110)
POCT GLUCOSE: 138 MG/DL (ref 70–110)
POCT GLUCOSE: 142 MG/DL (ref 70–110)
POCT GLUCOSE: 144 MG/DL (ref 70–110)
POCT GLUCOSE: 146 MG/DL (ref 70–110)
POCT GLUCOSE: 149 MG/DL (ref 70–110)
POCT GLUCOSE: 151 MG/DL (ref 70–110)
POCT GLUCOSE: 152 MG/DL (ref 70–110)
POCT GLUCOSE: 153 MG/DL (ref 70–110)
POCT GLUCOSE: 154 MG/DL (ref 70–110)
POCT GLUCOSE: 156 MG/DL (ref 70–110)
POCT GLUCOSE: 158 MG/DL (ref 70–110)
POCT GLUCOSE: 159 MG/DL (ref 70–110)
POCT GLUCOSE: 162 MG/DL (ref 70–110)
POCT GLUCOSE: 163 MG/DL (ref 70–110)
POCT GLUCOSE: 165 MG/DL (ref 70–110)
POCT GLUCOSE: 168 MG/DL (ref 70–110)
POCT GLUCOSE: 174 MG/DL (ref 70–110)
POCT GLUCOSE: 175 MG/DL (ref 70–110)
POCT GLUCOSE: 176 MG/DL (ref 70–110)
POCT GLUCOSE: 177 MG/DL (ref 70–110)
POCT GLUCOSE: 177 MG/DL (ref 70–110)
POCT GLUCOSE: 178 MG/DL (ref 70–110)
POCT GLUCOSE: 180 MG/DL (ref 70–110)
POCT GLUCOSE: 181 MG/DL (ref 70–110)
POCT GLUCOSE: 182 MG/DL (ref 70–110)
POCT GLUCOSE: 183 MG/DL (ref 70–110)
POCT GLUCOSE: 188 MG/DL (ref 70–110)
POCT GLUCOSE: 189 MG/DL (ref 70–110)
POCT GLUCOSE: 194 MG/DL (ref 70–110)
POCT GLUCOSE: 198 MG/DL (ref 70–110)
POCT GLUCOSE: 202 MG/DL (ref 70–110)
POCT GLUCOSE: 203 MG/DL (ref 70–110)
POCT GLUCOSE: 206 MG/DL (ref 70–110)
POCT GLUCOSE: 219 MG/DL (ref 70–110)
POCT GLUCOSE: 221 MG/DL (ref 70–110)
POCT GLUCOSE: 243 MG/DL (ref 70–110)
POCT GLUCOSE: 271 MG/DL (ref 70–110)
POCT GLUCOSE: 59 MG/DL (ref 70–110)
POCT GLUCOSE: 76 MG/DL (ref 70–110)
POCT GLUCOSE: 82 MG/DL (ref 70–110)
POCT GLUCOSE: 83 MG/DL (ref 70–110)
POCT GLUCOSE: 85 MG/DL (ref 70–110)
POCT GLUCOSE: 87 MG/DL (ref 70–110)
POCT GLUCOSE: 89 MG/DL (ref 70–110)
POCT GLUCOSE: 92 MG/DL (ref 70–110)
POCT GLUCOSE: 96 MG/DL (ref 70–110)
POCT GLUCOSE: 97 MG/DL (ref 70–110)
POCT GLUCOSE: 98 MG/DL (ref 70–110)
POTASSIUM SERPL-SCNC: 3.6 MMOL/L (ref 3.5–5.1)
POTASSIUM SERPL-SCNC: 3.6 MMOL/L (ref 3.5–5.1)
PROT SERPL-MCNC: 7 G/DL (ref 6–8.4)
PROT SERPL-MCNC: 7.9 G/DL (ref 6–8.4)
PROT UR QL STRIP: ABNORMAL
PROTHROMBIN TIME: 12.5 SEC (ref 9–12.5)
RBC # BLD AUTO: 4.31 M/UL (ref 4–5.4)
RBC # BLD AUTO: 4.91 M/UL (ref 4–5.4)
RBC #/AREA URNS AUTO: 0 /HPF (ref 0–4)
SAMPLE: ABNORMAL
SITE: ABNORMAL
SODIUM SERPL-SCNC: 144 MMOL/L (ref 136–145)
SODIUM SERPL-SCNC: 148 MMOL/L (ref 136–145)
SP GR UR STRIP: 1.02 (ref 1–1.03)
SPECIMEN SOURCE: NORMAL
SQUAMOUS #/AREA URNS AUTO: 1 /HPF
TSH SERPL DL<=0.005 MIU/L-ACNC: 0.53 UIU/ML (ref 0.4–4)
URN SPEC COLLECT METH UR: ABNORMAL
WBC # BLD AUTO: 10.45 K/UL (ref 3.9–12.7)
WBC # BLD AUTO: 11.21 K/UL (ref 3.9–12.7)
WBC #/AREA URNS AUTO: 0 /HPF (ref 0–5)

## 2019-01-01 PROCEDURE — 3075F SYST BP GE 130 - 139MM HG: CPT | Mod: CPTII,S$GLB,, | Performed by: ANESTHESIOLOGY

## 2019-01-01 PROCEDURE — 99232 SBSQ HOSP IP/OBS MODERATE 35: CPT | Mod: ,,, | Performed by: HOSPITALIST

## 2019-01-01 PROCEDURE — 3079F PR MOST RECENT DIASTOLIC BLOOD PRESSURE 80-89 MM HG: ICD-10-PCS | Mod: CPTII,S$GLB,, | Performed by: ANESTHESIOLOGY

## 2019-01-01 PROCEDURE — 3074F PR MOST RECENT SYSTOLIC BLOOD PRESSURE < 130 MM HG: ICD-10-PCS | Mod: CPTII,S$GLB,, | Performed by: PSYCHIATRY & NEUROLOGY

## 2019-01-01 PROCEDURE — 25000003 PHARM REV CODE 250: Performed by: INTERNAL MEDICINE

## 2019-01-01 PROCEDURE — 63600175 PHARM REV CODE 636 W HCPCS: Performed by: INTERNAL MEDICINE

## 2019-01-01 PROCEDURE — 3078F PR MOST RECENT DIASTOLIC BLOOD PRESSURE < 80 MM HG: ICD-10-PCS | Mod: CPTII,S$GLB,, | Performed by: ANESTHESIOLOGY

## 2019-01-01 PROCEDURE — 25000003 PHARM REV CODE 250: Performed by: PHYSICIAN ASSISTANT

## 2019-01-01 PROCEDURE — 99232 PR SUBSEQUENT HOSPITAL CARE,LEVL II: ICD-10-PCS | Mod: ,,, | Performed by: HOSPITALIST

## 2019-01-01 PROCEDURE — 1101F PT FALLS ASSESS-DOCD LE1/YR: CPT | Mod: CPTII,S$GLB,, | Performed by: INTERNAL MEDICINE

## 2019-01-01 PROCEDURE — 25000003 PHARM REV CODE 250: Performed by: HOSPITALIST

## 2019-01-01 PROCEDURE — 99231 PR SUBSEQUENT HOSPITAL CARE,LEVL I: ICD-10-PCS | Mod: ,,, | Performed by: HOSPITALIST

## 2019-01-01 PROCEDURE — 3074F PR MOST RECENT SYSTOLIC BLOOD PRESSURE < 130 MM HG: ICD-10-PCS | Mod: CPTII,S$GLB,, | Performed by: ANESTHESIOLOGY

## 2019-01-01 PROCEDURE — 1101F PR PT FALLS ASSESS DOC 0-1 FALLS W/OUT INJ PAST YR: ICD-10-PCS | Mod: CPTII,S$GLB,, | Performed by: PSYCHIATRY & NEUROLOGY

## 2019-01-01 PROCEDURE — 87502 INFLUENZA DNA AMP PROBE: CPT

## 2019-01-01 PROCEDURE — 3078F PR MOST RECENT DIASTOLIC BLOOD PRESSURE < 80 MM HG: ICD-10-PCS | Mod: CPTII,S$GLB,, | Performed by: PSYCHIATRY & NEUROLOGY

## 2019-01-01 PROCEDURE — 99204 OFFICE O/P NEW MOD 45 MIN: CPT | Mod: S$GLB,,, | Performed by: ANESTHESIOLOGY

## 2019-01-01 PROCEDURE — 99214 PR OFFICE/OUTPT VISIT, EST, LEVL IV, 30-39 MIN: ICD-10-PCS | Mod: S$GLB,,, | Performed by: PSYCHIATRY & NEUROLOGY

## 2019-01-01 PROCEDURE — 83605 ASSAY OF LACTIC ACID: CPT | Mod: 91

## 2019-01-01 PROCEDURE — G0378 HOSPITAL OBSERVATION PER HR: HCPCS

## 2019-01-01 PROCEDURE — 85025 COMPLETE CBC W/AUTO DIFF WBC: CPT

## 2019-01-01 PROCEDURE — 83735 ASSAY OF MAGNESIUM: CPT

## 2019-01-01 PROCEDURE — 1101F PR PT FALLS ASSESS DOC 0-1 FALLS W/OUT INJ PAST YR: ICD-10-PCS | Mod: CPTII,S$GLB,, | Performed by: ANESTHESIOLOGY

## 2019-01-01 PROCEDURE — 11000001 HC ACUTE MED/SURG PRIVATE ROOM

## 2019-01-01 PROCEDURE — S5571 INSULIN DISPOS PEN 3 ML: HCPCS | Performed by: INTERNAL MEDICINE

## 2019-01-01 PROCEDURE — 99291 CRITICAL CARE FIRST HOUR: CPT | Mod: ,,, | Performed by: EMERGENCY MEDICINE

## 2019-01-01 PROCEDURE — 85610 PROTHROMBIN TIME: CPT

## 2019-01-01 PROCEDURE — 99220 PR INITIAL OBSERVATION CARE,LEVL III: CPT | Mod: AI,GC,, | Performed by: INTERNAL MEDICINE

## 2019-01-01 PROCEDURE — 99220 PR INITIAL OBSERVATION CARE,LEVL III: ICD-10-PCS | Mod: AI,GC,, | Performed by: INTERNAL MEDICINE

## 2019-01-01 PROCEDURE — 3074F SYST BP LT 130 MM HG: CPT | Mod: CPTII,S$GLB,, | Performed by: PSYCHIATRY & NEUROLOGY

## 2019-01-01 PROCEDURE — 3079F DIAST BP 80-89 MM HG: CPT | Mod: CPTII,S$GLB,, | Performed by: ANESTHESIOLOGY

## 2019-01-01 PROCEDURE — 99231 SBSQ HOSP IP/OBS SF/LOW 25: CPT | Mod: ,,, | Performed by: HOSPITALIST

## 2019-01-01 PROCEDURE — 92610 EVALUATE SWALLOWING FUNCTION: CPT

## 2019-01-01 PROCEDURE — 99999 PR PBB SHADOW E&M-EST. PATIENT-LVL III: CPT | Mod: PBBFAC,,, | Performed by: PSYCHIATRY & NEUROLOGY

## 2019-01-01 PROCEDURE — 1101F PT FALLS ASSESS-DOCD LE1/YR: CPT | Mod: CPTII,S$GLB,, | Performed by: ANESTHESIOLOGY

## 2019-01-01 PROCEDURE — 82565 ASSAY OF CREATININE: CPT

## 2019-01-01 PROCEDURE — 36415 COLL VENOUS BLD VENIPUNCTURE: CPT

## 2019-01-01 PROCEDURE — 99204 OFFICE O/P NEW MOD 45 MIN: CPT | Mod: S$GLB,,, | Performed by: INTERNAL MEDICINE

## 2019-01-01 PROCEDURE — 99214 OFFICE O/P EST MOD 30 MIN: CPT | Mod: S$GLB,,, | Performed by: ANESTHESIOLOGY

## 2019-01-01 PROCEDURE — 82962 GLUCOSE BLOOD TEST: CPT

## 2019-01-01 PROCEDURE — 99900035 HC TECH TIME PER 15 MIN (STAT)

## 2019-01-01 PROCEDURE — 63600175 PHARM REV CODE 636 W HCPCS: Performed by: STUDENT IN AN ORGANIZED HEALTH CARE EDUCATION/TRAINING PROGRAM

## 2019-01-01 PROCEDURE — 99999 PR PBB SHADOW E&M-EST. PATIENT-LVL III: ICD-10-PCS | Mod: PBBFAC,,, | Performed by: PSYCHIATRY & NEUROLOGY

## 2019-01-01 PROCEDURE — 63600175 PHARM REV CODE 636 W HCPCS: Performed by: EMERGENCY MEDICINE

## 2019-01-01 PROCEDURE — 80053 COMPREHEN METABOLIC PANEL: CPT

## 2019-01-01 PROCEDURE — 3078F DIAST BP <80 MM HG: CPT | Mod: CPTII,S$GLB,, | Performed by: PSYCHIATRY & NEUROLOGY

## 2019-01-01 PROCEDURE — 99999 PR PBB SHADOW E&M-EST. PATIENT-LVL V: ICD-10-PCS | Mod: PBBFAC,,, | Performed by: ANESTHESIOLOGY

## 2019-01-01 PROCEDURE — 99499 UNLISTED E&M SERVICE: CPT | Mod: S$GLB,,, | Performed by: PSYCHIATRY & NEUROLOGY

## 2019-01-01 PROCEDURE — 99225 PR SUBSEQUENT OBSERVATION CARE,LEVEL II: ICD-10-PCS | Mod: ,,, | Performed by: HOSPITALIST

## 2019-01-01 PROCEDURE — 25000003 PHARM REV CODE 250: Performed by: EMERGENCY MEDICINE

## 2019-01-01 PROCEDURE — 3079F DIAST BP 80-89 MM HG: CPT | Mod: CPTII,S$GLB,, | Performed by: INTERNAL MEDICINE

## 2019-01-01 PROCEDURE — 87040 BLOOD CULTURE FOR BACTERIA: CPT

## 2019-01-01 PROCEDURE — 99999 PR PBB SHADOW E&M-EST. PATIENT-LVL V: CPT | Mod: PBBFAC,,, | Performed by: ANESTHESIOLOGY

## 2019-01-01 PROCEDURE — 99291 CRITICAL CARE FIRST HOUR: CPT | Mod: 25

## 2019-01-01 PROCEDURE — 3074F SYST BP LT 130 MM HG: CPT | Mod: CPTII,S$GLB,, | Performed by: ANESTHESIOLOGY

## 2019-01-01 PROCEDURE — 99214 PR OFFICE/OUTPT VISIT, EST, LEVL IV, 30-39 MIN: ICD-10-PCS | Mod: S$GLB,,, | Performed by: ANESTHESIOLOGY

## 2019-01-01 PROCEDURE — 87632 RESP VIRUS 6-11 TARGETS: CPT

## 2019-01-01 PROCEDURE — 85379 FIBRIN DEGRADATION QUANT: CPT

## 2019-01-01 PROCEDURE — 3078F DIAST BP <80 MM HG: CPT | Mod: CPTII,S$GLB,, | Performed by: ANESTHESIOLOGY

## 2019-01-01 PROCEDURE — 99204 PR OFFICE/OUTPT VISIT, NEW, LEVL IV, 45-59 MIN: ICD-10-PCS | Mod: S$GLB,,, | Performed by: ANESTHESIOLOGY

## 2019-01-01 PROCEDURE — 84443 ASSAY THYROID STIM HORMONE: CPT

## 2019-01-01 PROCEDURE — 99499 NO LOS: ICD-10-PCS | Mod: S$GLB,,, | Performed by: PSYCHIATRY & NEUROLOGY

## 2019-01-01 PROCEDURE — 99999 PR PBB SHADOW E&M-EST. PATIENT-LVL III: CPT | Mod: PBBFAC,,, | Performed by: INTERNAL MEDICINE

## 2019-01-01 PROCEDURE — 3075F PR MOST RECENT SYSTOLIC BLOOD PRESS GE 130-139MM HG: ICD-10-PCS | Mod: CPTII,S$GLB,, | Performed by: INTERNAL MEDICINE

## 2019-01-01 PROCEDURE — 1101F PR PT FALLS ASSESS DOC 0-1 FALLS W/OUT INJ PAST YR: ICD-10-PCS | Mod: CPTII,S$GLB,, | Performed by: INTERNAL MEDICINE

## 2019-01-01 PROCEDURE — 83880 ASSAY OF NATRIURETIC PEPTIDE: CPT

## 2019-01-01 PROCEDURE — 93010 ELECTROCARDIOGRAM REPORT: CPT | Mod: ,,, | Performed by: INTERNAL MEDICINE

## 2019-01-01 PROCEDURE — 82803 BLOOD GASES ANY COMBINATION: CPT

## 2019-01-01 PROCEDURE — 99224 PR SUBSEQUENT OBSERVATION CARE,LEVEL I: CPT | Mod: ,,, | Performed by: HOSPITALIST

## 2019-01-01 PROCEDURE — 93005 ELECTROCARDIOGRAM TRACING: CPT

## 2019-01-01 PROCEDURE — 99291 PR CRITICAL CARE, E/M 30-74 MINUTES: ICD-10-PCS | Mod: ,,, | Performed by: EMERGENCY MEDICINE

## 2019-01-01 PROCEDURE — 99224 PR SUBSEQUENT OBSERVATION CARE,LEVEL I: ICD-10-PCS | Mod: ,,, | Performed by: HOSPITALIST

## 2019-01-01 PROCEDURE — 81001 URINALYSIS AUTO W/SCOPE: CPT

## 2019-01-01 PROCEDURE — 63600175 PHARM REV CODE 636 W HCPCS: Performed by: PHYSICIAN ASSISTANT

## 2019-01-01 PROCEDURE — 36600 WITHDRAWAL OF ARTERIAL BLOOD: CPT

## 2019-01-01 PROCEDURE — 1101F PT FALLS ASSESS-DOCD LE1/YR: CPT | Mod: CPTII,S$GLB,, | Performed by: PSYCHIATRY & NEUROLOGY

## 2019-01-01 PROCEDURE — 99214 OFFICE O/P EST MOD 30 MIN: CPT | Mod: S$GLB,,, | Performed by: PSYCHIATRY & NEUROLOGY

## 2019-01-01 PROCEDURE — 3075F PR MOST RECENT SYSTOLIC BLOOD PRESS GE 130-139MM HG: ICD-10-PCS | Mod: CPTII,S$GLB,, | Performed by: ANESTHESIOLOGY

## 2019-01-01 PROCEDURE — 99999 PR PBB SHADOW E&M-EST. PATIENT-LVL III: ICD-10-PCS | Mod: PBBFAC,,, | Performed by: INTERNAL MEDICINE

## 2019-01-01 PROCEDURE — 99225 PR SUBSEQUENT OBSERVATION CARE,LEVEL II: CPT | Mod: ,,, | Performed by: HOSPITALIST

## 2019-01-01 PROCEDURE — 3079F PR MOST RECENT DIASTOLIC BLOOD PRESSURE 80-89 MM HG: ICD-10-PCS | Mod: CPTII,S$GLB,, | Performed by: INTERNAL MEDICINE

## 2019-01-01 PROCEDURE — 3075F SYST BP GE 130 - 139MM HG: CPT | Mod: CPTII,S$GLB,, | Performed by: INTERNAL MEDICINE

## 2019-01-01 PROCEDURE — 93010 EKG 12-LEAD: ICD-10-PCS | Mod: ,,, | Performed by: INTERNAL MEDICINE

## 2019-01-01 PROCEDURE — 84100 ASSAY OF PHOSPHORUS: CPT

## 2019-01-01 PROCEDURE — 99204 PR OFFICE/OUTPT VISIT, NEW, LEVL IV, 45-59 MIN: ICD-10-PCS | Mod: S$GLB,,, | Performed by: INTERNAL MEDICINE

## 2019-01-01 RX ORDER — ACETAMINOPHEN 325 MG/1
650 TABLET ORAL EVERY 4 HOURS PRN
Refills: 0 | COMMUNITY
Start: 2019-01-01

## 2019-01-01 RX ORDER — POLYETHYLENE GLYCOL 3350 17 G/17G
17 POWDER, FOR SOLUTION ORAL DAILY
Status: DISCONTINUED | OUTPATIENT
Start: 2019-01-01 | End: 2019-01-01

## 2019-01-01 RX ORDER — SODIUM CHLORIDE 0.9 % (FLUSH) 0.9 %
10 SYRINGE (ML) INJECTION
Status: DISCONTINUED | OUTPATIENT
Start: 2019-01-01 | End: 2019-01-01 | Stop reason: HOSPADM

## 2019-01-01 RX ORDER — MORPHINE SULFATE 2 MG/ML
1 INJECTION, SOLUTION INTRAMUSCULAR; INTRAVENOUS EVERY 6 HOURS PRN
Status: DISCONTINUED | OUTPATIENT
Start: 2019-01-01 | End: 2019-01-01 | Stop reason: HOSPADM

## 2019-01-01 RX ORDER — IPRATROPIUM BROMIDE AND ALBUTEROL SULFATE 2.5; .5 MG/3ML; MG/3ML
3 SOLUTION RESPIRATORY (INHALATION) EVERY 6 HOURS PRN
Status: DISCONTINUED | OUTPATIENT
Start: 2019-01-01 | End: 2019-01-01 | Stop reason: HOSPADM

## 2019-01-01 RX ORDER — VITS A,C,E/LUTEIN/MINERALS 300MCG-200
2 TABLET ORAL DAILY
COMMUNITY

## 2019-01-01 RX ORDER — ATORVASTATIN CALCIUM 20 MG/1
40 TABLET, FILM COATED ORAL DAILY
Status: DISCONTINUED | OUTPATIENT
Start: 2019-01-01 | End: 2019-01-01

## 2019-01-01 RX ORDER — INSULIN ASPART 100 [IU]/ML
INJECTION, SOLUTION INTRAVENOUS; SUBCUTANEOUS
Status: ON HOLD | COMMUNITY
Start: 2019-01-01 | End: 2019-01-01 | Stop reason: HOSPADM

## 2019-01-01 RX ORDER — POLYETHYLENE GLYCOL 3350 17 G/17G
17 POWDER, FOR SOLUTION ORAL DAILY
COMMUNITY

## 2019-01-01 RX ORDER — NAPROXEN 500 MG/1
500 TABLET ORAL 2 TIMES DAILY WITH MEALS
Qty: 60 TABLET | Refills: 1 | Status: ON HOLD | OUTPATIENT
Start: 2019-01-01 | End: 2019-01-01 | Stop reason: HOSPADM

## 2019-01-01 RX ORDER — NAPROXEN 500 MG/1
500 TABLET ORAL 2 TIMES DAILY WITH MEALS
Qty: 60 TABLET | Refills: 2 | Status: ON HOLD | OUTPATIENT
Start: 2019-01-01 | End: 2019-01-01 | Stop reason: HOSPADM

## 2019-01-01 RX ORDER — GABAPENTIN 100 MG/1
100 CAPSULE ORAL 3 TIMES DAILY
Qty: 90 CAPSULE | Refills: 2 | Status: ON HOLD | OUTPATIENT
Start: 2019-01-01 | End: 2019-01-01 | Stop reason: HOSPADM

## 2019-01-01 RX ORDER — GABAPENTIN 100 MG/1
100 CAPSULE ORAL 3 TIMES DAILY
COMMUNITY

## 2019-01-01 RX ORDER — METFORMIN HYDROCHLORIDE 500 MG/1
500 TABLET ORAL 2 TIMES DAILY WITH MEALS
COMMUNITY

## 2019-01-01 RX ORDER — ASPIRIN 81 MG/1
81 TABLET ORAL DAILY
COMMUNITY

## 2019-01-01 RX ORDER — ENOXAPARIN SODIUM 100 MG/ML
40 INJECTION SUBCUTANEOUS EVERY 24 HOURS
Status: DISCONTINUED | OUTPATIENT
Start: 2019-01-01 | End: 2019-01-01

## 2019-01-01 RX ORDER — ALENDRONATE SODIUM 70 MG/1
70 TABLET ORAL
COMMUNITY

## 2019-01-01 RX ORDER — LANOLIN ALCOHOL/MO/W.PET/CERES
100 CREAM (GRAM) TOPICAL DAILY
COMMUNITY

## 2019-01-01 RX ORDER — LISINOPRIL 20 MG/1
40 TABLET ORAL DAILY
Status: DISCONTINUED | OUTPATIENT
Start: 2019-01-01 | End: 2019-01-01

## 2019-01-01 RX ORDER — POLYETHYLENE GLYCOL 3350 17 G/17G
17 POWDER, FOR SOLUTION ORAL DAILY
Refills: 0 | Status: ON HOLD
Start: 2019-01-01 | End: 2019-01-01 | Stop reason: HOSPADM

## 2019-01-01 RX ORDER — SODIUM CHLORIDE 0.9 % (FLUSH) 0.9 %
10 SYRINGE (ML) INJECTION
Status: CANCELLED | OUTPATIENT
Start: 2019-01-01

## 2019-01-01 RX ORDER — ATORVASTATIN CALCIUM 40 MG/1
40 TABLET, FILM COATED ORAL DAILY
COMMUNITY

## 2019-01-01 RX ORDER — SODIUM CHLORIDE 9 MG/ML
1000 INJECTION, SOLUTION INTRAVENOUS
Status: COMPLETED | OUTPATIENT
Start: 2019-01-01 | End: 2019-01-01

## 2019-01-01 RX ORDER — IBUPROFEN 200 MG
24 TABLET ORAL
Status: DISCONTINUED | OUTPATIENT
Start: 2019-01-01 | End: 2019-01-01

## 2019-01-01 RX ORDER — NAPROXEN 500 MG/1
500 TABLET ORAL 2 TIMES DAILY WITH MEALS
COMMUNITY

## 2019-01-01 RX ORDER — INSULIN ASPART 100 [IU]/ML
INJECTION, SOLUTION INTRAVENOUS; SUBCUTANEOUS
COMMUNITY

## 2019-01-01 RX ORDER — CARVEDILOL 25 MG/1
25 TABLET ORAL 2 TIMES DAILY WITH MEALS
COMMUNITY

## 2019-01-01 RX ORDER — ACETAMINOPHEN 500 MG
1000 TABLET ORAL EVERY 8 HOURS PRN
Status: DISCONTINUED | OUTPATIENT
Start: 2019-01-01 | End: 2019-01-01

## 2019-01-01 RX ORDER — ACETAMINOPHEN 325 MG/1
650 TABLET ORAL EVERY 4 HOURS PRN
Status: DISCONTINUED | OUTPATIENT
Start: 2019-01-01 | End: 2019-01-01 | Stop reason: HOSPADM

## 2019-01-01 RX ORDER — MORPHINE SULFATE ORAL SOLUTION 10 MG/5ML
2.5 SOLUTION ORAL EVERY 4 HOURS PRN
Qty: 15 ML | Refills: 0 | Status: SHIPPED | OUTPATIENT
Start: 2019-01-01 | End: 2019-07-29

## 2019-01-01 RX ORDER — ONDANSETRON 8 MG/1
8 TABLET, ORALLY DISINTEGRATING ORAL EVERY 8 HOURS PRN
Status: DISCONTINUED | OUTPATIENT
Start: 2019-01-01 | End: 2019-01-01 | Stop reason: HOSPADM

## 2019-01-01 RX ORDER — ACETAMINOPHEN 325 MG/1
650 TABLET ORAL EVERY 4 HOURS PRN
COMMUNITY

## 2019-01-01 RX ORDER — IBUPROFEN 200 MG
16 TABLET ORAL
Status: DISCONTINUED | OUTPATIENT
Start: 2019-01-01 | End: 2019-01-01

## 2019-01-01 RX ORDER — HYDROCODONE BITARTRATE AND ACETAMINOPHEN 10; 325 MG/1; MG/1
1 TABLET ORAL EVERY 4 HOURS PRN
Qty: 20 TABLET | Refills: 0 | Status: ON HOLD | OUTPATIENT
Start: 2019-01-01 | End: 2019-01-01 | Stop reason: HOSPADM

## 2019-01-01 RX ORDER — ASPIRIN 81 MG/1
81 TABLET ORAL DAILY
Status: DISCONTINUED | OUTPATIENT
Start: 2019-01-01 | End: 2019-01-01

## 2019-01-01 RX ORDER — CEFTRIAXONE 1 G/1
1 INJECTION, POWDER, FOR SOLUTION INTRAMUSCULAR; INTRAVENOUS
Status: COMPLETED | OUTPATIENT
Start: 2019-01-01 | End: 2019-01-01

## 2019-01-01 RX ORDER — INSULIN ASPART 100 [IU]/ML
2 INJECTION, SOLUTION INTRAVENOUS; SUBCUTANEOUS
Status: DISCONTINUED | OUTPATIENT
Start: 2019-01-01 | End: 2019-01-01

## 2019-01-01 RX ORDER — LISINOPRIL 40 MG/1
40 TABLET ORAL DAILY
COMMUNITY

## 2019-01-01 RX ORDER — HYDROCODONE BITARTRATE AND ACETAMINOPHEN 10; 325 MG/1; MG/1
1 TABLET ORAL EVERY 4 HOURS PRN
Qty: 20 TABLET | Refills: 0 | Status: SHIPPED | OUTPATIENT
Start: 2019-01-01 | End: 2019-01-01

## 2019-01-01 RX ORDER — HYDROCODONE BITARTRATE AND ACETAMINOPHEN 10; 325 MG/1; MG/1
1 TABLET ORAL EVERY 6 HOURS PRN
Status: DISCONTINUED | OUTPATIENT
Start: 2019-01-01 | End: 2019-01-01 | Stop reason: HOSPADM

## 2019-01-01 RX ORDER — HYDROCODONE BITARTRATE AND ACETAMINOPHEN 10; 325 MG/1; MG/1
1 TABLET ORAL EVERY 4 HOURS PRN
Status: DISCONTINUED | OUTPATIENT
Start: 2019-01-01 | End: 2019-01-01 | Stop reason: HOSPADM

## 2019-01-01 RX ORDER — CARVEDILOL 25 MG/1
25 TABLET ORAL 2 TIMES DAILY WITH MEALS
Status: DISCONTINUED | OUTPATIENT
Start: 2019-01-01 | End: 2019-01-01 | Stop reason: HOSPADM

## 2019-01-01 RX ORDER — HEPARIN SODIUM 5000 [USP'U]/ML
5000 INJECTION, SOLUTION INTRAVENOUS; SUBCUTANEOUS EVERY 8 HOURS
Status: DISCONTINUED | OUTPATIENT
Start: 2019-01-01 | End: 2019-01-01

## 2019-01-01 RX ORDER — GLUCAGON 1 MG
1 KIT INJECTION
Status: DISCONTINUED | OUTPATIENT
Start: 2019-01-01 | End: 2019-01-01

## 2019-01-01 RX ORDER — GABAPENTIN 100 MG/1
100 CAPSULE ORAL 3 TIMES DAILY
Qty: 90 CAPSULE | Refills: 2 | Status: SHIPPED | OUTPATIENT
Start: 2019-01-01 | End: 2019-01-01

## 2019-01-01 RX ORDER — IPRATROPIUM BROMIDE AND ALBUTEROL SULFATE 2.5; .5 MG/3ML; MG/3ML
3 SOLUTION RESPIRATORY (INHALATION) EVERY 6 HOURS PRN
Qty: 1 BOX | Refills: 0 | Status: SHIPPED | OUTPATIENT
Start: 2019-01-01 | End: 2020-07-21

## 2019-01-01 RX ORDER — INSULIN ASPART 100 [IU]/ML
0-5 INJECTION, SOLUTION INTRAVENOUS; SUBCUTANEOUS EVERY 6 HOURS PRN
Status: DISCONTINUED | OUTPATIENT
Start: 2019-01-01 | End: 2019-01-01 | Stop reason: HOSPADM

## 2019-01-01 RX ADMIN — ATORVASTATIN CALCIUM 40 MG: 20 TABLET, FILM COATED ORAL at 08:01

## 2019-01-01 RX ADMIN — POLYETHYLENE GLYCOL 3350 17 G: 17 POWDER, FOR SOLUTION ORAL at 09:01

## 2019-01-01 RX ADMIN — POLYETHYLENE GLYCOL 3350 17 G: 17 POWDER, FOR SOLUTION ORAL at 08:01

## 2019-01-01 RX ADMIN — CARVEDILOL 25 MG: 25 TABLET, FILM COATED ORAL at 09:01

## 2019-01-01 RX ADMIN — INSULIN ASPART 4 UNITS: 100 INJECTION, SOLUTION INTRAVENOUS; SUBCUTANEOUS at 08:01

## 2019-01-01 RX ADMIN — INSULIN ASPART 4 UNITS: 100 INJECTION, SOLUTION INTRAVENOUS; SUBCUTANEOUS at 06:01

## 2019-01-01 RX ADMIN — LISINOPRIL 40 MG: 20 TABLET ORAL at 09:01

## 2019-01-01 RX ADMIN — HYDROCODONE BITARTRATE AND ACETAMINOPHEN 1 TABLET: 10; 325 TABLET ORAL at 11:01

## 2019-01-01 RX ADMIN — INSULIN DETEMIR 6 UNITS: 100 INJECTION, SOLUTION SUBCUTANEOUS at 08:01

## 2019-01-01 RX ADMIN — HYDROCODONE BITARTRATE AND ACETAMINOPHEN 1 TABLET: 10; 325 TABLET ORAL at 04:01

## 2019-01-01 RX ADMIN — CAPSAICIN: 0.25 CREAM TOPICAL at 05:01

## 2019-01-01 RX ADMIN — CARVEDILOL 25 MG: 25 TABLET, FILM COATED ORAL at 05:01

## 2019-01-01 RX ADMIN — CYANOCOBALAMIN TAB 1000 MCG 1000 MCG: 1000 TAB at 08:01

## 2019-01-01 RX ADMIN — INSULIN ASPART 2 UNITS: 100 INJECTION, SOLUTION INTRAVENOUS; SUBCUTANEOUS at 09:01

## 2019-01-01 RX ADMIN — CYANOCOBALAMIN TAB 1000 MCG 1000 MCG: 1000 TAB at 09:01

## 2019-01-01 RX ADMIN — CARVEDILOL 25 MG: 25 TABLET, FILM COATED ORAL at 08:01

## 2019-01-01 RX ADMIN — CARVEDILOL 25 MG: 25 TABLET, FILM COATED ORAL at 07:01

## 2019-01-01 RX ADMIN — INSULIN ASPART 4 UNITS: 100 INJECTION, SOLUTION INTRAVENOUS; SUBCUTANEOUS at 09:01

## 2019-01-01 RX ADMIN — HYDROCODONE BITARTRATE AND ACETAMINOPHEN 1 TABLET: 10; 325 TABLET ORAL at 02:01

## 2019-01-01 RX ADMIN — INSULIN ASPART 4 UNITS: 100 INJECTION, SOLUTION INTRAVENOUS; SUBCUTANEOUS at 01:01

## 2019-01-01 RX ADMIN — HYDROCODONE BITARTRATE AND ACETAMINOPHEN 1 TABLET: 10; 325 TABLET ORAL at 03:01

## 2019-01-01 RX ADMIN — HYDROCODONE BITARTRATE AND ACETAMINOPHEN 1 TABLET: 10; 325 TABLET ORAL at 09:01

## 2019-01-01 RX ADMIN — INSULIN DETEMIR 6 UNITS: 100 INJECTION, SOLUTION SUBCUTANEOUS at 10:01

## 2019-01-01 RX ADMIN — INSULIN ASPART 4 UNITS: 100 INJECTION, SOLUTION INTRAVENOUS; SUBCUTANEOUS at 03:01

## 2019-01-01 RX ADMIN — INSULIN ASPART 4 UNITS: 100 INJECTION, SOLUTION INTRAVENOUS; SUBCUTANEOUS at 05:01

## 2019-01-01 RX ADMIN — CARVEDILOL 25 MG: 25 TABLET, FILM COATED ORAL at 06:01

## 2019-01-01 RX ADMIN — HYDROCODONE BITARTRATE AND ACETAMINOPHEN 1 TABLET: 10; 325 TABLET ORAL at 06:01

## 2019-01-01 RX ADMIN — INSULIN ASPART 1 UNITS: 100 INJECTION, SOLUTION INTRAVENOUS; SUBCUTANEOUS at 09:01

## 2019-01-01 RX ADMIN — HYDROCODONE BITARTRATE AND ACETAMINOPHEN 1 TABLET: 10; 325 TABLET ORAL at 05:01

## 2019-01-01 RX ADMIN — INSULIN DETEMIR 6 UNITS: 100 INJECTION, SOLUTION SUBCUTANEOUS at 09:01

## 2019-01-01 RX ADMIN — INSULIN ASPART 4 UNITS: 100 INJECTION, SOLUTION INTRAVENOUS; SUBCUTANEOUS at 04:01

## 2019-01-01 RX ADMIN — ATORVASTATIN CALCIUM 40 MG: 20 TABLET, FILM COATED ORAL at 09:01

## 2019-01-01 RX ADMIN — MAGNESIUM OXIDE TAB 400 MG (241.3 MG ELEMENTAL MG) 400 MG: 400 (241.3 MG) TAB at 11:01

## 2019-01-01 RX ADMIN — ENOXAPARIN SODIUM 40 MG: 100 INJECTION SUBCUTANEOUS at 05:01

## 2019-01-01 RX ADMIN — MAGNESIUM OXIDE TAB 400 MG (241.3 MG ELEMENTAL MG) 400 MG: 400 (241.3 MG) TAB at 08:01

## 2019-01-01 RX ADMIN — HYDROCODONE BITARTRATE AND ACETAMINOPHEN 1 TABLET: 10; 325 TABLET ORAL at 12:01

## 2019-01-01 RX ADMIN — INSULIN DETEMIR 6 UNITS: 100 INJECTION, SOLUTION SUBCUTANEOUS at 11:01

## 2019-01-01 RX ADMIN — SODIUM CHLORIDE 1000 ML: 0.9 INJECTION, SOLUTION INTRAVENOUS at 12:07

## 2019-01-01 RX ADMIN — LISINOPRIL 40 MG: 20 TABLET ORAL at 08:01

## 2019-01-01 RX ADMIN — ENOXAPARIN SODIUM 40 MG: 100 INJECTION SUBCUTANEOUS at 06:01

## 2019-01-01 RX ADMIN — CAPSAICIN: 0.25 CREAM TOPICAL at 03:01

## 2019-01-01 RX ADMIN — HYDROCODONE BITARTRATE AND ACETAMINOPHEN 1 TABLET: 10; 325 TABLET ORAL at 07:01

## 2019-01-01 RX ADMIN — MAGNESIUM OXIDE TAB 400 MG (241.3 MG ELEMENTAL MG) 400 MG: 400 (241.3 MG) TAB at 09:01

## 2019-01-01 RX ADMIN — INSULIN ASPART 4 UNITS: 100 INJECTION, SOLUTION INTRAVENOUS; SUBCUTANEOUS at 12:01

## 2019-01-01 RX ADMIN — HYDROCODONE BITARTRATE AND ACETAMINOPHEN 1 TABLET: 10; 325 TABLET ORAL at 10:01

## 2019-01-01 RX ADMIN — CAPSAICIN: 0.25 CREAM TOPICAL at 12:01

## 2019-01-01 RX ADMIN — CYANOCOBALAMIN TAB 1000 MCG 1000 MCG: 1000 TAB at 10:01

## 2019-01-01 RX ADMIN — CARVEDILOL 25 MG: 25 TABLET, FILM COATED ORAL at 04:01

## 2019-01-01 RX ADMIN — HYDROCODONE BITARTRATE AND ACETAMINOPHEN 1 TABLET: 10; 325 TABLET ORAL at 08:01

## 2019-01-01 RX ADMIN — ENOXAPARIN SODIUM 40 MG: 100 INJECTION SUBCUTANEOUS at 04:01

## 2019-01-01 RX ADMIN — ENOXAPARIN SODIUM 40 MG: 100 INJECTION SUBCUTANEOUS at 09:07

## 2019-01-01 RX ADMIN — MORPHINE SULFATE 1 MG: 2 INJECTION, SOLUTION INTRAMUSCULAR; INTRAVENOUS at 01:07

## 2019-01-01 RX ADMIN — CAPSAICIN: 0.25 CREAM TOPICAL at 10:01

## 2019-01-01 RX ADMIN — CEFTRIAXONE SODIUM 1 G: 1 INJECTION, POWDER, FOR SOLUTION INTRAMUSCULAR; INTRAVENOUS at 03:07

## 2019-01-01 RX ADMIN — HYDROCODONE BITARTRATE AND ACETAMINOPHEN 1 TABLET: 10; 325 TABLET ORAL at 01:01

## 2019-01-01 RX ADMIN — SODIUM CHLORIDE 500 ML: 0.9 INJECTION, SOLUTION INTRAVENOUS at 03:07

## 2019-01-01 RX ADMIN — LISINOPRIL 40 MG: 20 TABLET ORAL at 10:01

## 2019-01-01 RX ADMIN — MAGNESIUM OXIDE TAB 400 MG (241.3 MG ELEMENTAL MG) 400 MG: 400 (241.3 MG) TAB at 10:01

## 2019-01-01 RX ADMIN — CAPSAICIN: 0.25 CREAM TOPICAL at 02:01

## 2019-01-01 RX ADMIN — INSULIN ASPART 4 UNITS: 100 INJECTION, SOLUTION INTRAVENOUS; SUBCUTANEOUS at 02:01

## 2019-01-01 RX ADMIN — CAPSAICIN: 0.25 CREAM TOPICAL at 09:01

## 2019-01-01 RX ADMIN — INSULIN ASPART 1 UNITS: 100 INJECTION, SOLUTION INTRAVENOUS; SUBCUTANEOUS at 08:01

## 2019-01-01 RX ADMIN — ATORVASTATIN CALCIUM 40 MG: 20 TABLET, FILM COATED ORAL at 10:01

## 2019-01-01 RX ADMIN — ACETAMINOPHEN 650 MG: 325 TABLET ORAL at 02:01

## 2019-01-01 RX ADMIN — CARVEDILOL 25 MG: 25 TABLET, FILM COATED ORAL at 10:01

## 2019-01-01 ASSESSMENT — ROUTINE ASSESSMENT OF PATIENT INDEX DATA (RAPID3)
MDHAQ FUNCTION SCORE: 3
AM STIFFNESS SCORE: 1, YES
PAIN SCORE: 7
PATIENT GLOBAL ASSESSMENT SCORE: 0
PSYCHOLOGICAL DISTRESS SCORE: 0
FATIGUE SCORE: 0
TOTAL RAPID3 SCORE: 5.66

## 2019-01-01 NOTE — PLAN OF CARE
Problem: Adult Inpatient Plan of Care  Goal: Plan of Care Review  Outcome: Ongoing (interventions implemented as appropriate)  Fall Risk  Patient will know how and when to properly prevent or manage fall risks specific to changing conditions in mobility and general health.  Patient will identify injury-free environments for reference and any unsafe or questionable conditions within the immediate area of mobility.  Patient will work with staff to effectively communicate assistance needs, accordingly.     Patient will work with staff to promote safer environmental conditions when ambulating.   Use of assistive devices will be used until healthcare providers and staff have agreed upon discontinuation.  With assistance from PT/OT, patient will understand physical limitations and work within what is tolerable.    Skin Injury Risk  Patient will identify tissue perfusion risks, as they may lead to integumentary compromise.  During periods of immobility and/or lengthy bed stays, patient will understand the need for consistent positional turning (q2 hours), pillows, and the use of prophylactic barrier creams and dressings.  Areas of shearing or early stage ulcers will be noted, treated, and inspected once every twelve hours.    Comorbidity Mgmt  Establish target blood glucose levels with patient specific factors.  Patient will understand and know how to treat glycemic fluctuations day to day, hour to hour.   Patient will be able to recite all complications of diabetes within their personal diagnosis profile.    Patient will contact nurse immediately for symptoms of low blood glucose (ie. tremors, sweating, drowsiness, weakness, headache, visual disturbances, etc.)    PT/OT Goals  Functional independence will be promoted and assessed regularly.  Ideally, patient will progress - with assistance - by gradually increasing active ROM skills.    Patient may start with ADLs in bed with light supervision.

## 2019-01-02 NOTE — PLAN OF CARE
Problem: Adult Inpatient Plan of Care  Goal: Plan of Care Review  Outcome: Ongoing (interventions implemented as appropriate)  Patient supine in bed. Patient complaints of pain to left shoulder. Norco 10 given as ordered. Tolerated well. Will continue to monitor.

## 2019-01-02 NOTE — PLAN OF CARE
CASTILLO following for DC needs. CASTILLO in communication with CM.    SW attempted to call Kayli ([phone]). The phone still says that it is not in service.     SW called patient's daughter, Erum ([phone]) to follow up on if the family made a decision on the plan for the patient. Erum stated that she spoke to her brother but had not spoken to her sister yet. SW asked what they thought the plan would be. Erum stated that they cannot take care of the patient at home so she thinks it will have to be nursing home placement. SW asked if they wanted the patient to still go to [facility] and Erum stated that she needs to talk to her brother and sister to see what is closest and convenient for everyone. SW explained that the patient is medically stable and choices are needed today. Erum is agreeable to give choices by today.     SW will follow up.     Maria Del Rosario Umanzor, LMSW [facility]  [id number]

## 2019-01-02 NOTE — PLAN OF CARE
SW received call back from patient's daughter in law, Kayli ([phone]). Kayli stated that the patient's children want her to go to a nursing home because they know they cannot take care of her at home. Kayli stated that they would like the patient closer to home so their choices for Nursing Home placement are: [facility], [facility], and [facility].     CASTILLO sent referral to [facility], and [facility] via [facility].      01/02/19 1125   Post-Acute Status   Post-Acute Authorization Placement   Post-Acute Placement Status Referrals Sent       Maria Del Rosario Umanzor LMSW  [facility]  [id number]

## 2019-01-02 NOTE — PLAN OF CARE
Problem: Skin Injury Risk Increased  Goal: Skin Health and Integrity  Outcome: Ongoing (interventions implemented as appropriate)  Patient lying in bed. AAO x 4. Rates pain 5/10 to BUE. No distress noted. Turn Q2 with wedge support during shift for pressure ulcer prevention utilize during shift. Plan of care reviewed with patient, with verbalization of understanding. Safety maintained.

## 2019-01-02 NOTE — PROGRESS NOTES
Hospital Medicine   Progress note   Team: [facility] Shanelle Crook MD   Admit Date: 12/14/2018   ANNITA 12/28/2018   Code status: Full Code   Principal Problem: Debility     Interval hx: Severe R arm pain, given pain pill x 2.  +depressed mood about prolonged admit and new Dx of debility. No family at bedside during rounds    ROS    Respiratory: negative for cough, shortness of breath   Cardiovascular: negative for chest discomfort, palpitations   Gastrointestinal: negative for nausea or vomiting, abdominal pain, constipation, diarrhea     PEx   Temp:  [97.3 °F (36.3 °C)-98.5 °F (36.9 °C)]   Pulse:  [58-75]   Resp:  [16-18]   BP: (111-172)/(67-79)   SpO2:  [96 %-98 %]      I & O (Last 24H):   No intake or output data in the 24 hours ending 01/01/19 1948    General Appearance: thin, elderly lady, supine  Heart: regular rate and rhythm   Respiratory: Normal respiratory effort, no crackles   Abdomen: Soft, non-tender; bowel sounds active   Skin: intact. IV sites ok   Neurologic: No focal numbness or weakness   Mental status: Alert, oriented x 4, affect appropriate, memory intact   Right UE: TTP, swollen with contracture of R fingers and elbow, very limited active movement of R arm or hand, some passive ROM but limited by pain      Recent Results (from the past 24 hour(s))   POCT glucose    Collection Time: 12/31/18  9:29 PM   Result Value Ref Range    POCT Glucose 184 (H) 70 - 110 mg/dL   POCT glucose    Collection Time: 12/31/18 11:46 PM   Result Value Ref Range    POCT Glucose 155 (H) 70 - 110 mg/dL   POCT glucose    Collection Time: 01/01/19  9:08 AM   Result Value Ref Range    POCT Glucose 112 (H) 70 - 110 mg/dL       Recent Labs   Lab 12/31/18  0830 12/31/18  1300 12/31/18  1741 12/31/18  2129 12/31/18  2346 01/01/19  0908   POCTGLUCOSE 109 148* 207* 184* 155* 112*            Active Hospital Problems    Diagnosis  POA    *Debility [R53.81]  Yes    Contracture of muscle, right upper arm [M62.421]  Yes     "Weakness of both lower extremities [R29.898]  Yes    Vitamin B12 deficiency anemia [D51.9]  Yes    Gait disturbance [R26.9]  Yes    Immobility [Z74.09]  Yes    Localized edema [R60.0]  Yes    Essential hypertension [I10]  Yes    Pure hypercholesterolemia [E78.00]  Yes    Type 2 diabetes mellitus without complication, without long-term current use of insulin [E11.9]  Yes      Resolved Hospital Problems   No resolved problems to display.        Overview:  82 YO lady with HTN, HLD, Type 2 diabetes on oral hypoglycemics and osteoarthritis who presented with complaint of persistant right arm weakness and right hand swelling for past 5 months. She denies injury or trauma. She has weakness of the entire RUE. She has difficulty with active shoulder, elbow, wrist, and finger motion. She has flexion contracture of the fingers. She notes edema of the hand which is improved with elevation. She is able to passively extend them to some degree however limited by pain.  She denies notable numbness or tingling. Confusion, trauma, swelling in any other extremities any rashes, fever or chills, neck pain,  hx of known CVA. She was evaluated by Orthopedics in past with  prior x-rays which are unremarkable. So she was sent to Neurology for further evaluation and recommended an EMG. On presentation to ED, she had an MRI brain done that showed " No evidence of acute intracranial pathology.Moderate generalized cerebral volume loss and moderate chronic microvascular ischemic changes" and US of RUE that showed "No thrombus in central veins of the right upper extremity."   Of note pt has been bed bound and not ambulatory for months (no clear inciting events) she has worked with PT and OT in past for her knee pain but has progressively has become bed bound and relies on her family for her ADLs. Family reports that they can no longer meet all her need and are requesting admission for placement to nursing home.      Hospital Course:  " Jonatan was admitted for nursing home placement and Neurology evaluation as to cause of progressive immobility and loss of use of right arm and leg weakness. H/H at baseline with anemia. B12 low, started on supplement. Patient hypertensive in absence of pain and started on Norvasc 10 mg po daily to treat. Neurology consulted, MRI of C-spine ordered and returned with only mild DJD of cervical spine but no spinal stenosis and does not explain patient's neurologic symptoms. EMG and NCV ordered on 12/18 to further work-up cause of progressive weakness. PT/OT consulted, recommended SNF placement and  working with family on placement choices. Pain to right arm improved with placement of splint to right hand and forearm to help with contractures. CPK normal at 61. Repeat MRI of thoracic and cervical spine with contrast done on 12/19 as per Neurology recs to make sure no structural cause of neurologic symptoms and repeat MRI unremarkable for any significant disc disease or spinal stenosis to explain patient's neurologic symptoms. Neurology states NCV/EMG can be done as outpatient as low suspicion for motor neuron disease.      PT/OT evaluated the patient.  We tried for SNF placement, but due to the more chronic nature of her disease, [facility] has denied SNF.  PEER to PEER done without change.  Pt will either need to go home with family and 24 hour care or jail nursing care.  Social work assisting.   Medically ready for discharge, awaiting placement.  [facility] denied SNF as pt is at baseline and not improving with therapy (per peer to peer y Dr Mai). Will need jail nursing home or home care with home health and 24 hour care. Social work working with family.     Assessment and Plan for problems addressed today:     Immobility     Gait disturbance  Localized edema of right hand  Contracture of muscle, right upper arm  Weakness of both lower extremities     - Condition unchanged since admit.   - Patient with  "progressive immobility and weak over past few months starting five months ago, worsening over past two months. Patient with flexion contracture of right hand, along with right hand edema and weakness as well as "tingling" pain in right elbow over the past few months that has recently worsened. Has seen orthopedics as outpatient with unremarkable Radiology.  - EMG/NCV ordered on 12/18 to further evaluate cause of RUE contractures and leg weakness as per Neurology recs but Neurology states can be done as outpatient as low suspicion for motor neuron disease.  - PT/OT consulted and evaluated patient and following - OT recommending SNF and  working with family and patient on choices. Patient not IP rehab candidate.   - MRI brain done showed "No evidence of acute intracranial pathology"  - US of RUE showed "No thrombus in central veins of the right upper extremity."  - MRI of cervical spine "Mild DJD of C-spine but no stenosis". Repeat MRI of thoracic and cervical spine on 12/19 with contrast with no stenosis or significant disc disease so no structural cause for right arm or leg weakness.   - CPK normal at 61  - Neurology following and appreciate recs and state no further inpatient evaluation needed and EMG/NCV can be done as outpatient and has EMG/NCV ordered for 2/8/2019.   - Pain medication PRN and topical capsaicin to right upper extremity. Pain related to contracture sand improved with placement of right arm splint.   - Edema to right hand and arm related to immobility and improved with splint.   - awaing NH versus home care            Essential hypertension     · Patient's blood pressure controlled.    · Goal for blood pressure is SBP < 140 and DBP < 90 as patient > or = 60 years of age and patient is diabetic based on JNC 8 guidelines.   · Norvasc 10 mg po daily stopped ( new med started on this admit for HTN) on 12/22 as patient with SBP 93. Patient continued on home meds of Lisinopril 40 mg po daily " "(home med) and Coreg 25 mg po BID (home med) to treat and BP has been stable on this regimen after Norvasc stopped on 12/22 with no further hypotension. Patient to be discharge to NH on this regimen.  · Plan is to monitor patient's blood pressure routinely while patient is hospitalized.       Type 2 diabetes mellitus without complication, without long-term current use of insulin     Controlled in past 24 hours. Holding home meds of Sitagliptin and Metformin while patient in hospital and treating with insulin but plan to resume on discharge to NH SNF.           · Last HgA1C 6 % and at goal.  · Plan is to continue Levemir 6 units twice a day and Novolog 4 units with meals to treat in hospital but will discharge on Metformin and Sitagliptin on hospital discharge.  · Plan is to continue to monitor POCT glucose 4 times a day with each meal and at bedtime and cover with Novolog low dose sliding scale insulin.   · Plan is to continue 1500 cat diabetic diet in the hospital.   · Target pre-meal glucose goal is <140 with all random glucoses <180 in non-critically ill patient.      Vitamin B12 deficiency anemia     - Controlled and stable.   - 11/23: B12 175, Ferritin 90, Transferrin 185, Sat Fe: 13, Fe: 35, TIBC: 274  - Patient started on B12 supplement at Vitamin B12 1000 mcg po daily as outpatient in 11/2018 and repeat Vitamin B12 level on this admit 1062. Patient to continue oral Vitamin B12 supplementation on hospital discharge.   - Folate level normal.       Pure hypercholesterolemia     Controlled. Continue Lipitor 40 mg po daily to treat on discharge.       Localized edema     R hand edema and weakness, pain improved  Unclear exact etiology  MRI brain ana maria howed "No evidence of acute intracranial pathology"  US showed "No thrombus in central veins of the right upper extremity."  Pt was supposed to have outpt EMG done by neurology  PT and OT  Pain medication PRN  Topical capsaicin                 Current " Facility-Administered Medications:     acetaminophen tablet 650 mg, 650 mg, Oral, Q4H PRN, Chrissy Ellington MD, 650 mg at 12/31/18 0426    atorvastatin tablet 40 mg, 40 mg, Oral, Daily, Chrissy Ellington MD, 40 mg at 01/01/19 1024    capsaicin 0.025 % cream, , Topical (Top), QID PRN, Unique Quispe, PA-C    carvedilol tablet 25 mg, 25 mg, Oral, BID WM, Chrissy Ellington MD, 25 mg at 01/01/19 1800    cyanocobalamin tablet 1,000 mcg, 1,000 mcg, Oral, Daily, Unique Quispe, PA-C, 1,000 mcg at 01/01/19 1025    dextrose 50% injection 12.5 g, 12.5 g, Intravenous, PRN, Unique Quispe, PA-C    dextrose 50% injection 25 g, 25 g, Intravenous, PRN, Unique CARRINGTON Linaresy, PA-C    enoxaparin injection 40 mg, 40 mg, Subcutaneous, Daily, Yodit Issa MD, 40 mg at 01/01/19 1605    glucagon (human recombinant) injection 1 mg, 1 mg, Intramuscular, PRN, Unique Quispe, PA-C    glucose chewable tablet 16 g, 16 g, Oral, PRN, Unique E. Milagorsy, PA-C    glucose chewable tablet 24 g, 24 g, Oral, PRN, Unique EFely Morphy, PA-C    hydrALAZINE tablet 25 mg, 25 mg, Oral, Q8H PRN, Unique CARRINGTON Linaresy, PA-C    HYDROcodone-acetaminophen  mg per tablet 1 tablet, 1 tablet, Oral, Q6H PRN, Unique E. Morphy, PA-C, 1 tablet at 01/01/19 1604    HYDROcodone-acetaminophen 5-325 mg per tablet 1 tablet, 1 tablet, Oral, Q6H PRN, Unique E. Milagrosy, PA-C, 1 tablet at 12/31/18 2130    insulin aspart U-100 pen 0-5 Units, 0-5 Units, Subcutaneous, QID (AC + HS) PRN, Unique CARRINGTON Linaresy, PA-C, 2 Units at 12/31/18 1743    insulin aspart U-100 pen 4 Units, 4 Units, Subcutaneous, TIDWM, Yodit Issa MD, 4 Units at 01/01/19 1801    insulin detemir U-100 pen 6 Units, 6 Units, Subcutaneous, BID, Yodit Issa MD, 6 Units at 01/01/19 1026    lisinopril tablet 40 mg, 40 mg, Oral, Daily, Chrissy Ellington MD, 40 mg at 01/01/19 1024    magnesium hydroxide 400 mg/5 ml suspension 2,400 mg, 30 mL, Oral, Daily PRN, Anjum Whitfield,  YENNY, 2,400 mg at 12/29/18 2038    magnesium oxide tablet 400 mg, 400 mg, Oral, Daily, Chrissy Ellington MD, 400 mg at 01/01/19 1025    polyethylene glycol packet 17 g, 17 g, Oral, Daily, Anjum Whitfield PA-C, 17 g at 12/31/18 0815    sodium chloride 0.9% flush 5 mL, 5 mL, Intravenous, PRN, Chrissy Ellington MD    DVT PPx: lovenox    Discharge plan and follow up: see above. Likely needs 24/7 care at home. SW/CM to assist. D/C ASAP    Shanelle Crook MD  Hospital Medicine Staff  [phone] pager

## 2019-01-02 NOTE — NURSING
Pt passing liquid to soft brown stool.trying to pass a large hard ball of stool but unsuccessful.manually disimpacted,removed a large amt of hard balls of stool.

## 2019-01-03 NOTE — PROGRESS NOTES
Hospital Medicine   Progress note   Team: [facility] [place] [facility] Shanelle Crook MD   Admit Date: 12/14/2018   ANNITA 1/4/2019   Code status: Full Code   Principal Problem: Debility     Interval hx: No new issues, await placement in [facility]. She is very eager for placement.     ROS    Respiratory: negative for cough, shortness of breath   Cardiovascular: negative for chest discomfort, palpitations   Gastrointestinal: negative for nausea or vomiting, abdominal pain, constipation, diarrhea     PEx   Temp:  [97.6 °F (36.4 °C)-98.7 °F (37.1 °C)]   Pulse:  [59-69]   Resp:  [16-18]   BP: (127-163)/(66-76)   SpO2:  [97 %-98 %]      I & O (Last 24H):     Intake/Output Summary (Last 24 hours) at 1/3/2019 0907  Last data filed at 1/3/2019 0415  Gross per 24 hour   Intake 120 ml   Output --   Net 120 ml       General Appearance: thin, elderly lady, supine  Heart: regular rate and rhythm   Respiratory: Normal respiratory effort, no crackles   Abdomen: Soft, non-tender; bowel sounds active   Skin: intact. IV sites ok   Neurologic: No focal numbness or weakness   Mental status: Alert, oriented x 4, affect appropriate, memory intact   Right UE: TTP, swollen with contracture of R fingers and elbow, very limited active movement of R arm or hand, some passive ROM but limited by pain      Recent Results (from the past 24 hour(s))   POCT glucose    Collection Time: 01/02/19 11:44 AM   Result Value Ref Range    POCT Glucose 129 (H) 70 - 110 mg/dL   POCT glucose    Collection Time: 01/02/19  3:52 PM   Result Value Ref Range    POCT Glucose 153 (H) 70 - 110 mg/dL   POCT glucose    Collection Time: 01/02/19  9:26 PM   Result Value Ref Range    POCT Glucose 188 (H) 70 - 110 mg/dL   POCT glucose    Collection Time: 01/03/19  8:39 AM   Result Value Ref Range    POCT Glucose 131 (H) 70 - 110 mg/dL       Recent Labs   Lab 01/01/19  2036 01/02/19  0813 01/02/19  1144 01/02/19  1552 01/02/19  2126 01/03/19  0839   POCTGLUCOSE 183* 101 129* 153* 188*  "131*            Active Hospital Problems    Diagnosis  POA    *Debility [R53.81]  Yes    Contracture of muscle, right upper arm [M62.421]  Yes    Weakness of both lower extremities [R29.898]  Yes    Vitamin B12 deficiency anemia [D51.9]  Yes    Gait disturbance [R26.9]  Yes    Immobility [Z74.09]  Yes    Localized edema [R60.0]  Yes    Essential hypertension [I10]  Yes    Pure hypercholesterolemia [E78.00]  Yes    Type 2 diabetes mellitus without complication, without long-term current use of insulin [E11.9]  Yes      Resolved Hospital Problems   No resolved problems to display.        Overview:  82 YO lady with HTN, HLD, Type 2 diabetes on oral hypoglycemics and osteoarthritis who presented with complaint of persistant right arm weakness and right hand swelling for past 5 months. She denies injury or trauma. She has weakness of the entire RUE. She has difficulty with active shoulder, elbow, wrist, and finger motion. She has flexion contracture of the fingers. She notes edema of the hand which is improved with elevation. She is able to passively extend them to some degree however limited by pain.  She denies notable numbness or tingling. Confusion, trauma, swelling in any other extremities any rashes, fever or chills, neck pain,  hx of known CVA. She was evaluated by Orthopedics in past with  prior x-rays which are unremarkable. So she was sent to Neurology for further evaluation and recommended an EMG. On presentation to ED, she had an MRI brain done that showed " No evidence of acute intracranial pathology.Moderate generalized cerebral volume loss and moderate chronic microvascular ischemic changes" and US of RUE that showed "No thrombus in central veins of the right upper extremity."   Of note pt has been bed bound and not ambulatory for months (no clear inciting events) she has worked with PT and OT in past for her knee pain but has progressively has become bed bound and relies on her family for her " ADLs. Family reports that they can no longer meet all her need and are requesting admission for placement to nursing home.      Hospital Course:  Mrs. Cheung was admitted for nursing home placement and Neurology evaluation as to cause of progressive immobility and loss of use of right arm and leg weakness. H/H at baseline with anemia. B12 low, started on supplement. Patient hypertensive in absence of pain and started on Norvasc 10 mg po daily to treat. Neurology consulted, MRI of C-spine ordered and returned with only mild DJD of cervical spine but no spinal stenosis and does not explain patient's neurologic symptoms. EMG and NCV ordered on 12/18 to further work-up cause of progressive weakness. PT/OT consulted, recommended SNF placement and  working with family on placement choices. Pain to right arm improved with placement of splint to right hand and forearm to help with contractures. CPK normal at 61. Repeat MRI of thoracic and cervical spine with contrast done on 12/19 as per Neurology recs to make sure no structural cause of neurologic symptoms and repeat MRI unremarkable for any significant disc disease or spinal stenosis to explain patient's neurologic symptoms. Neurology states NCV/EMG can be done as outpatient as low suspicion for motor neuron disease.      PT/OT evaluated the patient.  We tried for SNF placement, but due to the more chronic nature of her disease, [facility] has denied SNF.  PEER to PEER done without change.  Pt will either need to go home with family and 24 hour care or [facility] nursing care.  Social work assisting.   Medically ready for discharge, awaiting placement.  [facility] denied SNF as pt is at baseline and not improving with therapy (per peer to peer by Dr Mai). Will need [facility] nursing home or home care with home health and 24 hour care. Social work working with family. Family unable to take care of her at home, so pursing [facility] NH placement.     Assessment and Plan for  "problems addressed today:     Immobility     Gait disturbance  Localized edema of right hand  Contracture of muscle, right upper arm  Weakness of both lower extremities     - Condition unchanged since admit.   - Patient with progressive immobility and weak over past few months starting five months ago, worsening over past two months. Patient with flexion contracture of right hand, along with right hand edema and weakness as well as "tingling" pain in right elbow over the past few months that has recently worsened. Has seen orthopedics as outpatient with unremarkable Radiology.  - EMG/NCV ordered on 12/18 to further evaluate cause of RUE contractures and leg weakness as per Neurology recs but Neurology states can be done as outpatient as low suspicion for motor neuron disease.  - MRI brain done showed "No evidence of acute intracranial pathology"  - US of RUE showed "No thrombus in central veins of the right upper extremity."  - MRI of cervical spine "Mild DJD of C-spine but no stenosis". Repeat MRI of thoracic and cervical spine on 12/19 with contrast with no stenosis or significant disc disease so no structural cause for right arm or leg weakness.   - CPK normal at 61  - Neurology following and appreciate recs and state no further inpatient evaluation needed and EMG/NCV can be done as outpatient and has EMG/NCV ordered for 2/8/2019.   - Pain medication PRN and topical capsaicin to right upper extremity. Pain related to contracture sand improved with placement of right arm splint.   - Edema to right hand and arm related to immobility and improved with splint.   - awaing NH versus home care            Essential hypertension     · Patient's blood pressure controlled.    · Goal for blood pressure is SBP < 140 and DBP < 90 as patient > or = 60 years of age and patient is diabetic based on JNC 8 guidelines.   · Norvasc 10 mg po daily stopped ( new med started on this admit for HTN) on 12/22 as patient with SBP 93. " "Patient continued on home meds of Lisinopril 40 mg po daily (home med) and Coreg 25 mg po BID (home med) to treat and BP has been stable on this regimen after Norvasc stopped on 12/22 with no further hypotension. Patient to be discharge to NH on this regimen.  · Plan is to monitor patient's blood pressure routinely while patient is hospitalized.       Type 2 diabetes mellitus without complication, without long-term current use of insulin     Controlled in past 24 hours. Holding home meds of Sitagliptin and Metformin while patient in hospital and treating with insulin but plan to resume on discharge to NH SNF.  · Last HgA1C 6 % and at goal.  · Plan is to continue Levemir 6 units twice a day and Novolog 4 units with meals to treat in hospital but will discharge on Metformin and Sitagliptin  · SSI and accucheks  · Target pre-meal glucose goal is <140 with all random glucoses <180 in non-critically ill patient.      Vitamin B12 deficiency anemia     - Controlled and stable.   - 11/23: B12 175, Ferritin 90, Transferrin 185, Sat Fe: 13, Fe: 35, TIBC: 274  - Patient started on B12 supplement at Vitamin B12 1000 mcg po daily as outpatient in 11/2018 and repeat Vitamin B12 level on this admit 1062. Patient to continue oral Vitamin B12 supplementation on hospital discharge.   - Folate level normal.       Pure hypercholesterolemia     Controlled. Continue Lipitor 40 mg po daily to treat on discharge.       Localized edema     R hand edema and weakness, pain improved  Unclear exact etiology  MRI brain ana maria howed "No evidence of acute intracranial pathology"  US showed "No thrombus in central veins of the right upper extremity."  Pt was supposed to have outpt EMG done by neurology  PT and OT  Pain medication PRN  Topical capsaicin           Current Facility-Administered Medications:     acetaminophen tablet 650 mg, 650 mg, Oral, Q4H PRN, Chrissy Ellington MD, 650 mg at 01/02/19 0252    atorvastatin tablet 40 mg, 40 mg, Oral, " Daily, Chrissy Ellington MD, 40 mg at 01/03/19 0815    capsaicin 0.025 % cream, , Topical (Top), QID PRN, Unique Quispe PA-C    carvedilol tablet 25 mg, 25 mg, Oral, BID WM, Chrissy Ellington MD, 25 mg at 01/03/19 0815    cyanocobalamin tablet 1,000 mcg, 1,000 mcg, Oral, Daily, DANIELLA Quan-C, 1,000 mcg at 01/03/19 0815    dextrose 50% injection 12.5 g, 12.5 g, Intravenous, PRN, Unique Quispe PA-C    dextrose 50% injection 25 g, 25 g, Intravenous, PRN, Unique Quispe, PA-C    enoxaparin injection 40 mg, 40 mg, Subcutaneous, Daily, Yodit Issa MD, 40 mg at 01/02/19 1628    glucagon (human recombinant) injection 1 mg, 1 mg, Intramuscular, PRN, DANIELLA Quan-C    glucose chewable tablet 16 g, 16 g, Oral, PRN, Unique Quispe PA-C    glucose chewable tablet 24 g, 24 g, Oral, PRN, Unique Quispe PA-C    hydrALAZINE tablet 25 mg, 25 mg, Oral, Q8H PRN, Unique Quispe, PA-C    HYDROcodone-acetaminophen  mg per tablet 1 tablet, 1 tablet, Oral, Q6H PRN, Unique Quispe PA-C, 1 tablet at 01/03/19 0616    HYDROcodone-acetaminophen 5-325 mg per tablet 1 tablet, 1 tablet, Oral, Q6H PRN, Unique Quispe, PA-C, 1 tablet at 12/31/18 2130    insulin aspart U-100 pen 0-5 Units, 0-5 Units, Subcutaneous, QID (AC + HS) PRN, Unique Quispe PA-C, 2 Units at 12/31/18 1743    insulin aspart U-100 pen 4 Units, 4 Units, Subcutaneous, TIDWM, Yodit Issa MD, 4 Units at 01/03/19 0816    insulin detemir U-100 pen 6 Units, 6 Units, Subcutaneous, BID, Yodit Issa MD, 6 Units at 01/03/19 0816    lisinopril tablet 40 mg, 40 mg, Oral, Daily, Chrissy Ellington MD, 40 mg at 01/03/19 0815    magnesium hydroxide 400 mg/5 ml suspension 2,400 mg, 30 mL, Oral, Daily PRN, Anjum Whitfield PA-C, 2,400 mg at 12/29/18 2038    magnesium oxide tablet 400 mg, 400 mg, Oral, Daily, Chrissy Ellington MD, 400 mg at 01/03/19 0816    polyethylene glycol packet 17 g, 17 g, Oral,  Daily, Anjum Whitfield PA-C, 17 g at 01/03/19 0817    sodium chloride 0.9% flush 5 mL, 5 mL, Intravenous, PRN, Chrissy Ellington MD    DVT PPx: lovenox    Discharge plan and follow up: [facility]. Medically ready d/c.    Shanelle Crook MD  Hospital Medicine Staff  [phone] pager

## 2019-01-03 NOTE — PLAN OF CARE
Problem: Adult Inpatient Plan of Care  Goal: Plan of Care Review  Pt. Remains on fall precautions,required maximum assistance to put pt.back to bed,Unable to bend knees,yellow socks maintained,no falls.Pt's skin without redness or breakdown,turned side to side when willing,heels offloaded. Blood sugar 188,no coverage required,continue to check sugars ac and hs.Continue current plan of care.

## 2019-01-03 NOTE — PLAN OF CARE
Problem: Adult Inpatient Plan of Care  Goal: Plan of Care Review  Outcome: Ongoing (interventions implemented as appropriate)  Patient sitting up in chair at bedside. Patient alert and oriented. Patient denies any complaint of pain or discomfort at this time. Will continue to monitor.

## 2019-01-03 NOTE — PROGRESS NOTES
Hospital Medicine   Progress note   Team: [facility] HOSP MED Z Shanelle Crook MD   Admit Date: 12/14/2018   ANNITA 1/4/2019   Code status: Full Code   Principal Problem: Debility     Interval hx: No new issues, await placement    ROS    Respiratory: negative for cough, shortness of breath   Cardiovascular: negative for chest discomfort, palpitations   Gastrointestinal: negative for nausea or vomiting, abdominal pain, constipation, diarrhea     PEx   Temp:  [97 °F (36.1 °C)-98.6 °F (37 °C)]   Pulse:  [59-70]   Resp:  [16-18]   BP: (127-173)/(66-81)   SpO2:  [97 %-99 %]      I & O (Last 24H):   No intake or output data in the 24 hours ending 01/1936    General Appearance: thin, elderly lady, supine  Heart: regular rate and rhythm   Respiratory: Normal respiratory effort, no crackles   Abdomen: Soft, non-tender; bowel sounds active   Skin: intact. IV sites ok   Neurologic: No focal numbness or weakness   Mental status: Alert, oriented x 4, affect appropriate, memory intact   Right UE: TTP, swollen with contracture of R fingers and elbow, very limited active movement of R arm or hand, some passive ROM but limited by pain      Recent Results (from the past 24 hour(s))   POCT glucose    Collection Time: 01/01/19  8:36 PM   Result Value Ref Range    POCT Glucose 183 (H) 70 - 110 mg/dL   POCT glucose    Collection Time: 01/02/19  8:13 AM   Result Value Ref Range    POCT Glucose 101 70 - 110 mg/dL   POCT glucose    Collection Time: 01/02/19 11:44 AM   Result Value Ref Range    POCT Glucose 129 (H) 70 - 110 mg/dL   POCT glucose    Collection Time: 01/02/19  3:52 PM   Result Value Ref Range    POCT Glucose 153 (H) 70 - 110 mg/dL       Recent Labs   Lab 01/01/19  0908 01/01/19  1746 01/01/19  2036 01/02/19  0813 01/02/19  1144 01/02/19  1552   POCTGLUCOSE 112* 156* 183* 101 129* 153*            Active Hospital Problems    Diagnosis  POA    *Debility [R53.81]  Yes    Contracture of muscle, right upper arm [M62.421]  Yes     "Weakness of both lower extremities [R29.898]  Yes    Vitamin B12 deficiency anemia [D51.9]  Yes    Gait disturbance [R26.9]  Yes    Immobility [Z74.09]  Yes    Localized edema [R60.0]  Yes    Essential hypertension [I10]  Yes    Pure hypercholesterolemia [E78.00]  Yes    Type 2 diabetes mellitus without complication, without long-term current use of insulin [E11.9]  Yes      Resolved Hospital Problems   No resolved problems to display.        Overview:  82 YO lady with HTN, HLD, Type 2 diabetes on oral hypoglycemics and osteoarthritis who presented with complaint of persistant right arm weakness and right hand swelling for past 5 months. She denies injury or trauma. She has weakness of the entire RUE. She has difficulty with active shoulder, elbow, wrist, and finger motion. She has flexion contracture of the fingers. She notes edema of the hand which is improved with elevation. She is able to passively extend them to some degree however limited by pain.  She denies notable numbness or tingling. Confusion, trauma, swelling in any other extremities any rashes, fever or chills, neck pain,  hx of known CVA. She was evaluated by Orthopedics in past with  prior x-rays which are unremarkable. So she was sent to Neurology for further evaluation and recommended an EMG. On presentation to ED, she had an MRI brain done that showed " No evidence of acute intracranial pathology.Moderate generalized cerebral volume loss and moderate chronic microvascular ischemic changes" and US of RUE that showed "No thrombus in central veins of the right upper extremity."   Of note pt has been bed bound and not ambulatory for months (no clear inciting events) she has worked with PT and OT in past for her knee pain but has progressively has become bed bound and relies on her family for her ADLs. Family reports that they can no longer meet all her need and are requesting admission for placement to nursing home.      Hospital Course:  " Jonatan was admitted for nursing home placement and Neurology evaluation as to cause of progressive immobility and loss of use of right arm and leg weakness. H/H at baseline with anemia. B12 low, started on supplement. Patient hypertensive in absence of pain and started on Norvasc 10 mg po daily to treat. Neurology consulted, MRI of C-spine ordered and returned with only mild DJD of cervical spine but no spinal stenosis and does not explain patient's neurologic symptoms. EMG and NCV ordered on 12/18 to further work-up cause of progressive weakness. PT/OT consulted, recommended SNF placement and  working with family on placement choices. Pain to right arm improved with placement of splint to right hand and forearm to help with contractures. CPK normal at 61. Repeat MRI of thoracic and cervical spine with contrast done on 12/19 as per Neurology recs to make sure no structural cause of neurologic symptoms and repeat MRI unremarkable for any significant disc disease or spinal stenosis to explain patient's neurologic symptoms. Neurology states NCV/EMG can be done as outpatient as low suspicion for motor neuron disease.      PT/OT evaluated the patient.  We tried for SNF placement, but due to the more chronic nature of her disease, [facility] has denied SNF.  PEER to PEER done without change.  Pt will either need to go home with family and 24 hour care or nursing home nursing care.  Social work assisting.   Medically ready for discharge, awaiting placement.  [facility] denied SNF as pt is at baseline and not improving with therapy (per peer to peer by Dr Mai). Will need nursing home nursing home or home care with home health and 24 hour care. Social work working with family. Family unable to take care of her at home, so pursing nursing home NH placement.     Assessment and Plan for problems addressed today:     Immobility     Gait disturbance  Localized edema of right hand  Contracture of muscle, right upper arm  Weakness of both  "lower extremities     - Condition unchanged since admit.   - Patient with progressive immobility and weak over past few months starting five months ago, worsening over past two months. Patient with flexion contracture of right hand, along with right hand edema and weakness as well as "tingling" pain in right elbow over the past few months that has recently worsened. Has seen orthopedics as outpatient with unremarkable Radiology.  - EMG/NCV ordered on 12/18 to further evaluate cause of RUE contractures and leg weakness as per Neurology recs but Neurology states can be done as outpatient as low suspicion for motor neuron disease.  - MRI brain done showed "No evidence of acute intracranial pathology"  - US of RUE showed "No thrombus in central veins of the right upper extremity."  - MRI of cervical spine "Mild DJD of C-spine but no stenosis". Repeat MRI of thoracic and cervical spine on 12/19 with contrast with no stenosis or significant disc disease so no structural cause for right arm or leg weakness.   - CPK normal at 61  - Neurology following and appreciate recs and state no further inpatient evaluation needed and EMG/NCV can be done as outpatient and has EMG/NCV ordered for 2/8/2019.   - Pain medication PRN and topical capsaicin to right upper extremity. Pain related to contracture sand improved with placement of right arm splint.   - Edema to right hand and arm related to immobility and improved with splint.   - awaing NH versus home care            Essential hypertension     · Patient's blood pressure controlled.    · Goal for blood pressure is SBP < 140 and DBP < 90 as patient > or = 60 years of age and patient is diabetic based on JNC 8 guidelines.   · Norvasc 10 mg po daily stopped ( new med started on this admit for HTN) on 12/22 as patient with SBP 93. Patient continued on home meds of Lisinopril 40 mg po daily (home med) and Coreg 25 mg po BID (home med) to treat and BP has been stable on this regimen " "after Norvasc stopped on 12/22 with no further hypotension. Patient to be discharge to NH on this regimen.  · Plan is to monitor patient's blood pressure routinely while patient is hospitalized.       Type 2 diabetes mellitus without complication, without long-term current use of insulin     Controlled in past 24 hours. Holding home meds of Sitagliptin and Metformin while patient in hospital and treating with insulin but plan to resume on discharge to NH SNF.  · Last HgA1C 6 % and at goal.  · Plan is to continue Levemir 6 units twice a day and Novolog 4 units with meals to treat in hospital but will discharge on Metformin and Sitagliptin  · SSI and accucheks  · Target pre-meal glucose goal is <140 with all random glucoses <180 in non-critically ill patient.      Vitamin B12 deficiency anemia     - Controlled and stable.   - 11/23: B12 175, Ferritin 90, Transferrin 185, Sat Fe: 13, Fe: 35, TIBC: 274  - Patient started on B12 supplement at Vitamin B12 1000 mcg po daily as outpatient in 11/2018 and repeat Vitamin B12 level on this admit 1062. Patient to continue oral Vitamin B12 supplementation on hospital discharge.   - Folate level normal.       Pure hypercholesterolemia     Controlled. Continue Lipitor 40 mg po daily to treat on discharge.       Localized edema     R hand edema and weakness, pain improved  Unclear exact etiology  MRI brain ana maria howed "No evidence of acute intracranial pathology"  US showed "No thrombus in central veins of the right upper extremity."  Pt was supposed to have outpt EMG done by neurology  PT and OT  Pain medication PRN  Topical capsaicin                 Current Facility-Administered Medications:     acetaminophen tablet 650 mg, 650 mg, Oral, Q4H PRN, Chrissy Ellington MD, 650 mg at 01/02/19 0252    atorvastatin tablet 40 mg, 40 mg, Oral, Daily, Chrissy Ellington MD, 40 mg at 01/02/19 0822    capsaicin 0.025 % cream, , Topical (Top), QID PRN, Unique Quispe PA-C    carvedilol " tablet 25 mg, 25 mg, Oral, BID WM, Chrissy Ellington MD, 25 mg at 01/02/19 1628    cyanocobalamin tablet 1,000 mcg, 1,000 mcg, Oral, Daily, DANIELLA Quan-C, 1,000 mcg at 01/02/19 0821    dextrose 50% injection 12.5 g, 12.5 g, Intravenous, PRN, Unique Quispe, PA-C    dextrose 50% injection 25 g, 25 g, Intravenous, PRN, Unique Quispe, PA-C    enoxaparin injection 40 mg, 40 mg, Subcutaneous, Daily, Yodit Issa MD, 40 mg at 01/02/19 1628    glucagon (human recombinant) injection 1 mg, 1 mg, Intramuscular, PRN, Unique Quispe PA-C    glucose chewable tablet 16 g, 16 g, Oral, PRN, Unique Quispe, PA-C    glucose chewable tablet 24 g, 24 g, Oral, PRN, Unique Quispe, PA-C    hydrALAZINE tablet 25 mg, 25 mg, Oral, Q8H PRN, Uinque Quispe, PA-C    HYDROcodone-acetaminophen  mg per tablet 1 tablet, 1 tablet, Oral, Q6H PRN, Unique Quispe PA-C, 1 tablet at 01/02/19 1628    HYDROcodone-acetaminophen 5-325 mg per tablet 1 tablet, 1 tablet, Oral, Q6H PRN, Unique Quispe PA-C, 1 tablet at 12/31/18 2130    insulin aspart U-100 pen 0-5 Units, 0-5 Units, Subcutaneous, QID (AC + HS) PRN, Unique Quispe PA-C, 2 Units at 12/31/18 1743    insulin aspart U-100 pen 4 Units, 4 Units, Subcutaneous, TIDWM, Yodit Issa MD, 4 Units at 01/02/19 1801    insulin detemir U-100 pen 6 Units, 6 Units, Subcutaneous, BID, Yodit Issa MD, 6 Units at 01/02/19 0822    lisinopril tablet 40 mg, 40 mg, Oral, Daily, Chrissy Ellington MD, 40 mg at 01/02/19 0822    magnesium hydroxide 400 mg/5 ml suspension 2,400 mg, 30 mL, Oral, Daily PRN, Anjum Whitfield PA-C, 2,400 mg at 12/29/18 2038    magnesium oxide tablet 400 mg, 400 mg, Oral, Daily, Chrissy Ellington MD, 400 mg at 01/02/19 0821    polyethylene glycol packet 17 g, 17 g, Oral, Daily, Anjum Whitfield PA-C, 17 g at 01/02/19 0822    sodium chloride 0.9% flush 5 mL, 5 mL, Intravenous, PRN, Chrissy Ellington MD    DVT PPx:  lovenox    Discharge plan and follow up: await senior care NH placement    Shanelle Crook MD  [facility] Medicine Staff  [phone] pager

## 2019-01-03 NOTE — PLAN OF CARE
CASTILLO following for DC needs. CASTILLO in communication with CM.    CASTILLO called [facility] ([phone]) to follow up on patient's referral. CASTILLO spoke to admissions and the referral is still being reviewed.     CASTILLO spoke to Kim in admissions at [facility] ([phone]) to follow up on referral. Kim stated that she is going to consult with her admissions coordinator and notify SW if they can accept.    CASTILLO spoke to Veena in admissions at [facility] ([phone]) to follow up on patient's referral. Veena stated that the referral has not yet been reviewed but they will review it now.     Maria Del Rosario Umanzor, CASTILLO  [facility]  [id number]

## 2019-01-04 NOTE — PLAN OF CARE
CASTILLO following for DC needs. CASTILLO in communication with CM.     CASTILLO called [facility] ([phone]) to follow up on patient's referral. Admissions asked SW to resend referral because the referral cannot be found today. SW resent referral in [facility].      CASTILLO spoke to Kim in admissions at [facility] ([phone]) to follow up on referral. Kim asked for an update on what is being done for the mass on the patient's arm. CASTILLO contacted Dr. Crook who explained the plan. CASTILLO notified Kim of the plan.       CASTILLO spoke to Veena in admissions at [facility] ([phone]) to follow up on patient's referral. Veena stated that the patient has been clinically accepted and she will reach out to the family to discuss financials. CASTILLO provided Veena with Kayli's current phone number ([phone]).    UPDATE 3:36 PM  CASTILLO received call from Ms. Barnhart at [facility] who stated that when they print from [employer] everything is blurred. Ms. Barnhart asked CASTILLO to paper fax the referral to [phone]. CASTILLO faxed referral .     Maria Del Rosario Umanzor, LMSW [facility]  [id number]

## 2019-01-04 NOTE — PLAN OF CARE
Problem: Adult Inpatient Plan of Care  Goal: Plan of Care Review  Outcome: Ongoing (interventions implemented as appropriate)  No falls sustained this shift 4 rails up per pt's request. Pt. Assisted to turn side to side when awake no redness or breakdown noted. Pt. Has a history of diabetes,received levimer insulin last PM,no coverage needed.  Continues to experience pain to right arm,hand and fingers,effectively controlled with hydrocodone 10mg,continue plan of care.

## 2019-01-05 NOTE — PLAN OF CARE
Problem: Adult Inpatient Plan of Care  Goal: Plan of Care Review  Outcome: Ongoing (interventions implemented as appropriate)   01/05/19 0512   Plan of Care Review   Plan of Care Reviewed With patient     Fall Risk  Patient will know how and when to properly prevent or manage fall risks specific to changing conditions in mobility and general health.  Patient will identify injury-free environments for reference and any unsafe or questionable conditions within the immediate area of mobility.  Patient will work with staff to effectively communicate assistance needs, accordingly.     Patient will work with staff to promote safer environmental conditions when ambulating.   Use of assistive devices will be used until healthcare providers and staff have agreed upon discontinuation.  With assistance from PT/OT, patient will understand physical limitations and work within what is tolerable.  Skin Injury Risk  Patient will identify tissue perfusion risks, as they may lead to integumentary compromise.  During periods of immobility and/or lengthy bed stays, patient will understand the need for consistent positional turning (q2 hours), pillows, and the use of prophylactic barrier creams and dressings.  Areas of shearing or early stage ulcers will be noted, treated, and inspected once every twelve hours.    Comorbidity Mgmt  Establish target blood glucose levels with patient specific factors.  Patient will understand and know how to treat glycemic fluctuations day to day, hour to hour.   Patient will be able to recite all complications of diabetes within their personal diagnosis profile.    Patient will contact nurse immediately for symptoms of low blood glucose (ie. tremors, sweating, drowsiness, weakness, headache, visual disturbances, etc.)    PT/OT Goals  Functional independence will be promoted and assessed regularly.  Ideally, patient will progress - with assistance - by gradually increasing active ROM skills.    Patient  may start with ADLs in bed with light supervision.

## 2019-01-05 NOTE — PROGRESS NOTES
Hospital Medicine   Progress note   Team: [facility] [place] [facility] [place] Shanelle Crook MD   Admit Date: 12/14/2018   ANNITA 1/7/2019   Code status: Full Code   Principal Problem: Debility     Interval hx: No new issues, continue to await placement in alf SNF. See SW note. She is very eager for placement and tearful when speaking re: how long she has been here.     ROS    Respiratory: negative for cough, shortness of breath   Cardiovascular: negative for chest discomfort, palpitations   Gastrointestinal: negative for nausea or vomiting, abdominal pain, constipation, diarrhea     PEx   Temp:  [97.5 °F (36.4 °C)-98.5 °F (36.9 °C)]   Pulse:  [60-74]   Resp:  [18]   BP: (117-174)/(60-87)   SpO2:  [95 %-100 %]      I & O (Last 24H):     Intake/Output Summary (Last 24 hours) at 1/4/2019 1909  Last data filed at 1/4/2019 0930  Gross per 24 hour   Intake 540 ml   Output --   Net 540 ml       General Appearance: thin, elderly lady, supine  Heart: regular rate and rhythm   Respiratory: Normal respiratory effort, no crackles   Abdomen: Soft, non-tender; bowel sounds active   Skin: intact. IV sites ok   Neurologic: No focal numbness or weakness   Mental status: Alert, oriented x 4, affect appropriate, memory intact   Right UE: TTP, swollen with contracture of R fingers and elbow, very limited active movement of R arm or hand, some passive ROM but limited by pain      Recent Results (from the past 24 hour(s))   POCT glucose    Collection Time: 01/03/19  8:58 PM   Result Value Ref Range    POCT Glucose 178 (H) 70 - 110 mg/dL   POCT glucose    Collection Time: 01/04/19  8:38 AM   Result Value Ref Range    POCT Glucose 129 (H) 70 - 110 mg/dL   POCT glucose    Collection Time: 01/04/19 12:12 PM   Result Value Ref Range    POCT Glucose 194 (H) 70 - 110 mg/dL   POCT glucose    Collection Time: 01/04/19  5:24 PM   Result Value Ref Range    POCT Glucose 98 70 - 110 mg/dL       Recent Labs   Lab 01/03/19  1238 01/03/19  1718 01/03/19  8984  "01/04/19  0838 01/04/19  1212 01/04/19  1724   POCTGLUCOSE 243* 203* 178* 129* 194* 98            Active Hospital Problems    Diagnosis  POA    *Debility [R53.81]  Yes    Contracture of muscle, right upper arm [M62.421]  Yes    Weakness of both lower extremities [R29.898]  Yes    Vitamin B12 deficiency anemia [D51.9]  Yes    Gait disturbance [R26.9]  Yes    Immobility [Z74.09]  Yes    Localized edema [R60.0]  Yes    Essential hypertension [I10]  Yes    Pure hypercholesterolemia [E78.00]  Yes    Type 2 diabetes mellitus without complication, without long-term current use of insulin [E11.9]  Yes      Resolved Hospital Problems   No resolved problems to display.        Overview:  84 YO lady with HTN, HLD, Type 2 diabetes on oral hypoglycemics and osteoarthritis who presented with complaint of persistant right arm weakness and right hand swelling for past 5 months. She denies injury or trauma. She has weakness of the entire RUE. She has difficulty with active shoulder, elbow, wrist, and finger motion. She has flexion contracture of the fingers. She notes edema of the hand which is improved with elevation. She is able to passively extend them to some degree however limited by pain.  She denies notable numbness or tingling. Confusion, trauma, swelling in any other extremities any rashes, fever or chills, neck pain,  hx of known CVA. She was evaluated by Orthopedics in past with  prior x-rays which are unremarkable. So she was sent to Neurology for further evaluation and recommended an EMG. On presentation to ED, she had an MRI brain done that showed " No evidence of acute intracranial pathology.Moderate generalized cerebral volume loss and moderate chronic microvascular ischemic changes" and US of RUE that showed "No thrombus in central veins of the right upper extremity."   Of note pt has been bed bound and not ambulatory for months (no clear inciting events) she has worked with PT and OT in past for her knee " pain but has progressively has become bed bound and relies on her family for her ADLs. Family reports that they can no longer meet all her need and are requesting admission for placement to nursing home.      Hospital Course:  Mrs. Cheung was admitted for nursing home placement and Neurology evaluation as to cause of progressive immobility and loss of use of right arm and leg weakness. H/H at baseline with anemia. B12 low, started on supplement. Patient hypertensive in absence of pain and started on Norvasc 10 mg po daily to treat. Neurology consulted, MRI of C-spine ordered and returned with only mild DJD of cervical spine but no spinal stenosis and does not explain patient's neurologic symptoms. EMG and NCV ordered on 12/18 to further work-up cause of progressive weakness. PT/OT consulted, recommended SNF placement and  working with family on placement choices. Pain to right arm improved with placement of splint to right hand and forearm to help with contractures. CPK normal at 61. Repeat MRI of thoracic and cervical spine with contrast done on 12/19 as per Neurology recs to make sure no structural cause of neurologic symptoms and repeat MRI unremarkable for any significant disc disease or spinal stenosis to explain patient's neurologic symptoms. Neurology states NCV/EMG can be done as outpatient as low suspicion for motor neuron disease.      PT/OT evaluated the patient.  We tried for SNF placement, but due to the more chronic nature of her disease, [facility] has denied SNF.  PEER to PEER done without change.  Pt will either need to go home with family and 24 hour care or longterm nursing care.  Social work assisting.   Medically ready for discharge, awaiting placement.  [facility] denied SNF as pt is at baseline and not improving with therapy (per peer to peer by Dr Mai). Will need longterm nursing home or home care with home health and 24 hour care. Social work working with family. Family unable to take  "care of her at home, so pursing half-way NH placement.     Assessment and Plan for problems addressed today:     Immobility     Gait disturbance  Localized edema of right hand  Contracture of muscle, right upper arm  Weakness of both lower extremities     - Condition unchanged since admit.   - Patient with progressive immobility and weak over past few months starting five months ago, worsening over past two months. Patient with flexion contracture of right hand, along with right hand edema and weakness as well as "tingling" pain in right elbow over the past few months that has recently worsened. Has seen orthopedics as outpatient with unremarkable Radiology.  - EMG/NCV ordered on 12/18 to further evaluate cause of RUE contractures and leg weakness as per Neurology recs but Neurology states can be done as outpatient as low suspicion for motor neuron disease.  - MRI brain done showed "No evidence of acute intracranial pathology"  - US of RUE showed "No thrombus in central veins of the right upper extremity."  - MRI of cervical spine "Mild DJD of C-spine but no stenosis". Repeat MRI of thoracic and cervical spine on 12/19 with contrast with no stenosis or significant disc disease so no structural cause for right arm or leg weakness.   - CPK normal at 61  - Neurology following and appreciate recs and state no further inpatient evaluation needed and EMG/NCV can be done as outpatient and has EMG/NCV ordered for 2/8/2019.   - Pain medication PRN and topical capsaicin to right upper extremity. Pain related to contracture sand improved with placement of right arm splint.   - Edema to right hand and arm related to immobility and improved with splint.   - awaing NH versus home care            Essential hypertension     · Patient's blood pressure controlled.    · Goal for blood pressure is SBP < 140 and DBP < 90 as patient > or = 60 years of age and patient is diabetic based on JNC 8 guidelines.   · Norvasc 10 mg po daily " "stopped ( new med started on this admit for HTN) on 12/22 as patient with SBP 93. Patient continued on home meds of Lisinopril 40 mg po daily (home med) and Coreg 25 mg po BID (home med) to treat and BP has been stable on this regimen after Norvasc stopped on 12/22 with no further hypotension. Patient to be discharge to NH on this regimen.  · Plan is to monitor patient's blood pressure routinely while patient is hospitalized.       Type 2 diabetes mellitus without complication, without long-term current use of insulin     Controlled in past 24 hours. Holding home meds of Sitagliptin and Metformin while patient in hospital and treating with insulin but plan to resume on discharge to NH SNF.  · Last HgA1C 6 % and at goal.  · Plan is to continue Levemir 6 units twice a day and Novolog 4 units with meals to treat in hospital but will discharge on Metformin and Sitagliptin  · SSI and accucheks  · Target pre-meal glucose goal is <140 with all random glucoses <180 in non-critically ill patient.      Vitamin B12 deficiency anemia     - Controlled and stable.   - 11/23: B12 175, Ferritin 90, Transferrin 185, Sat Fe: 13, Fe: 35, TIBC: 274  - Patient started on B12 supplement at Vitamin B12 1000 mcg po daily as outpatient in 11/2018 and repeat Vitamin B12 level on this admit 1062. Patient to continue oral Vitamin B12 supplementation on hospital discharge.   - Folate level normal.       Pure hypercholesterolemia     Controlled. Continue Lipitor 40 mg po daily to treat on discharge.       Localized edema     R hand edema and weakness, pain improved  Unclear exact etiology  MRI brain ana maria howed "No evidence of acute intracranial pathology"  US showed "No thrombus in central veins of the right upper extremity."  Pt was supposed to have outpt EMG done by neurology  PT and OT  Pain medication PRN  Topical capsaicin           Current Facility-Administered Medications:     acetaminophen tablet 650 mg, 650 mg, Oral, Q4H PRN, Chrissy " MD Rakel, 650 mg at 01/02/19 0252    atorvastatin tablet 40 mg, 40 mg, Oral, Daily, Chrissy Ellington MD, 40 mg at 01/04/19 0909    capsaicin 0.025 % cream, , Topical (Top), QID PRN, PHUONG QuanC    carvedilol tablet 25 mg, 25 mg, Oral, BID WM, Chrissy Ellington MD, 25 mg at 01/04/19 1752    cyanocobalamin tablet 1,000 mcg, 1,000 mcg, Oral, Daily, DANIELLA Quan-C, 1,000 mcg at 01/04/19 0910    dextrose 50% injection 12.5 g, 12.5 g, Intravenous, PRN, Unique Quispe PA-C    dextrose 50% injection 25 g, 25 g, Intravenous, PRN, Unique Quispe PA-C    enoxaparin injection 40 mg, 40 mg, Subcutaneous, Daily, Yodit Issa MD, 40 mg at 01/04/19 1752    glucagon (human recombinant) injection 1 mg, 1 mg, Intramuscular, PRN, Unique Quispe PA-C    glucose chewable tablet 16 g, 16 g, Oral, PRN, Unique Quispe PA-C    glucose chewable tablet 24 g, 24 g, Oral, PRN, Unique Quispe PA-C    hydrALAZINE tablet 25 mg, 25 mg, Oral, Q8H PRN, Unique Quispe PA-C    HYDROcodone-acetaminophen  mg per tablet 1 tablet, 1 tablet, Oral, Q4H PRN, Shanelle Crook MD, 1 tablet at 01/04/19 1752    insulin aspart U-100 pen 0-5 Units, 0-5 Units, Subcutaneous, QID (AC + HS) PRN, Unique Quispe PA-C, 2 Units at 12/31/18 1743    insulin aspart U-100 pen 4 Units, 4 Units, Subcutaneous, TIDWM, Yodit Issa MD, 4 Units at 01/04/19 1304    insulin detemir U-100 pen 6 Units, 6 Units, Subcutaneous, BID, Yodit Issa MD, 6 Units at 01/04/19 0910    lisinopril tablet 40 mg, 40 mg, Oral, Daily, Chrissy Ellington MD, 40 mg at 01/04/19 0909    magnesium hydroxide 400 mg/5 ml suspension 2,400 mg, 30 mL, Oral, Daily PRN, Anjum Whitfield PA-C, 2,400 mg at 12/29/18 2038    magnesium oxide tablet 400 mg, 400 mg, Oral, Daily, Chrissy Ellington MD, 400 mg at 01/04/19 0910    polyethylene glycol packet 17 g, 17 g, Oral, Daily, Anjum Whitfield PA-C, 17 g at 01/04/19 0900    sodium  chloride 0.9% flush 5 mL, 5 mL, Intravenous, PRN, Chrissy Ellington MD    DVT PPx: lovenox    Discharge plan and follow up: await detention NH placement. Medically ready d/c.    Shanelle Crook MD  Hospital Medicine Staff  [phone] pager

## 2019-01-05 NOTE — PROGRESS NOTES
Hospital Medicine   Progress note   Team: [facility] [place] [facility] [place] Shanelle Crook MD   Admit Date: 12/14/2018   ANNITA 1/7/2019   Code status: Full Code   Principal Problem: Debility     Interval hx: No new issues, continue to await placement in skilled nursing SNF. See SW note. She is very eager for placement and tearful when speaking re: how long she has been here.     ROS    Respiratory: negative for cough, shortness of breath   Cardiovascular: negative for chest discomfort, palpitations   Gastrointestinal: negative for nausea or vomiting, abdominal pain, constipation, diarrhea     PEx   Temp:  [97.5 °F (36.4 °C)-98.7 °F (37.1 °C)]   Pulse:  [58-67]   Resp:  [15-18]   BP: (117-172)/(63-77)   SpO2:  [96 %-100 %]      I & O (Last 24H):     Intake/Output Summary (Last 24 hours) at 1/5/2019 1541  Last data filed at 1/4/2019 2353  Gross per 24 hour   Intake --   Output 2 ml   Net -2 ml       General Appearance: thin, elderly lady, supine  Heart: regular rate and rhythm   Respiratory: Normal respiratory effort, no crackles   Abdomen: Soft, non-tender; bowel sounds active   Skin: intact. IV sites ok   Neurologic: No focal numbness or weakness   Mental status: Alert, oriented x 4, affect appropriate, memory intact   Right UE: TTP, swollen with contracture of R fingers and elbow, very limited active movement of R arm or hand, some passive ROM but limited by pain      Recent Results (from the past 24 hour(s))   POCT glucose    Collection Time: 01/04/19  5:24 PM   Result Value Ref Range    POCT Glucose 98 70 - 110 mg/dL   POCT glucose    Collection Time: 01/04/19  8:50 PM   Result Value Ref Range    POCT Glucose 202 (H) 70 - 110 mg/dL   POCT glucose    Collection Time: 01/05/19  8:35 AM   Result Value Ref Range    POCT Glucose 122 (H) 70 - 110 mg/dL   POCT glucose    Collection Time: 01/05/19  1:28 PM   Result Value Ref Range    POCT Glucose 130 (H) 70 - 110 mg/dL   POCT glucose    Collection Time: 01/05/19  1:40 PM   Result Value Ref Range  "   POCT Glucose 152 (H) 70 - 110 mg/dL       Recent Labs   Lab 01/04/19  1212 01/04/19  1724 01/04/19  2050 01/05/19  0835 01/05/19  1328 01/05/19  1340   POCTGLUCOSE 194* 98 202* 122* 130* 152*            Active Hospital Problems    Diagnosis  POA    *Debility [R53.81]  Yes    Contracture of muscle, right upper arm [M62.421]  Yes    Weakness of both lower extremities [R29.898]  Yes    Vitamin B12 deficiency anemia [D51.9]  Yes    Gait disturbance [R26.9]  Yes    Immobility [Z74.09]  Yes    Localized edema [R60.0]  Yes    Essential hypertension [I10]  Yes    Pure hypercholesterolemia [E78.00]  Yes    Type 2 diabetes mellitus without complication, without long-term current use of insulin [E11.9]  Yes      Resolved Hospital Problems   No resolved problems to display.        Overview:  84 YO lady with HTN, HLD, Type 2 diabetes on oral hypoglycemics and osteoarthritis who presented with complaint of persistant right arm weakness and right hand swelling for past 5 months. She denies injury or trauma. She has weakness of the entire RUE. She has difficulty with active shoulder, elbow, wrist, and finger motion. She has flexion contracture of the fingers. She notes edema of the hand which is improved with elevation. She is able to passively extend them to some degree however limited by pain.  She denies notable numbness or tingling. Confusion, trauma, swelling in any other extremities any rashes, fever or chills, neck pain,  hx of known CVA. She was evaluated by Orthopedics in past with  prior x-rays which are unremarkable. So she was sent to Neurology for further evaluation and recommended an EMG. On presentation to ED, she had an MRI brain done that showed " No evidence of acute intracranial pathology.Moderate generalized cerebral volume loss and moderate chronic microvascular ischemic changes" and US of RUE that showed "No thrombus in central veins of the right upper extremity."   Of note pt has been bed bound " and not ambulatory for months (no clear inciting events) she has worked with PT and OT in past for her knee pain but has progressively has become bed bound and relies on her family for her ADLs. Family reports that they can no longer meet all her need and are requesting admission for placement to nursing home.      Hospital Course:  Mrs. Cheung was admitted for nursing home placement and Neurology evaluation as to cause of progressive immobility and loss of use of right arm and leg weakness. H/H at baseline with anemia. B12 low, started on supplement. Patient hypertensive in absence of pain and started on Norvasc 10 mg po daily to treat. Neurology consulted, MRI of C-spine ordered and returned with only mild DJD of cervical spine but no spinal stenosis and does not explain patient's neurologic symptoms. EMG and NCV ordered on 12/18 to further work-up cause of progressive weakness. PT/OT consulted, recommended SNF placement and  working with family on placement choices. Pain to right arm improved with placement of splint to right hand and forearm to help with contractures. CPK normal at 61. Repeat MRI of thoracic and cervical spine with contrast done on 12/19 as per Neurology recs to make sure no structural cause of neurologic symptoms and repeat MRI unremarkable for any significant disc disease or spinal stenosis to explain patient's neurologic symptoms. Neurology states NCV/EMG can be done as outpatient as low suspicion for motor neuron disease.      PT/OT evaluated the patient.  We tried for SNF placement, but due to the more chronic nature of her disease, [facility] has denied SNF.  PEER to PEER done without change.  Pt will either need to go home with family and 24 hour care or longterm nursing care.  Social work assisting.   Medically ready for discharge, awaiting placement.  [facility] denied SNF as pt is at baseline and not improving with therapy (per peer to peer by Dr Mai). Will need longterm nursing  "home or home care with home health and 24 hour care. Social work working with family. Family unable to take care of her at home, so pursing care home NH placement.     Assessment and Plan for problems addressed today:     Immobility     Gait disturbance  Localized edema of right hand  Contracture of muscle, right upper arm  Weakness of both lower extremities     - Condition unchanged since admit.   - Patient with progressive immobility and weak over past few months starting five months ago, worsening over past two months. Patient with flexion contracture of right hand, along with right hand edema and weakness as well as "tingling" pain in right elbow over the past few months that has recently worsened. Has seen orthopedics as outpatient with unremarkable Radiology.  - EMG/NCV ordered on 12/18 to further evaluate cause of RUE contractures and leg weakness as per Neurology recs but Neurology states can be done as outpatient as low suspicion for motor neuron disease.  - MRI brain done showed "No evidence of acute intracranial pathology"  - US of RUE showed "No thrombus in central veins of the right upper extremity."  - MRI of cervical spine "Mild DJD of C-spine but no stenosis". Repeat MRI of thoracic and cervical spine on 12/19 with contrast with no stenosis or significant disc disease so no structural cause for right arm or leg weakness.   - CPK normal at 61  - Neurology following and appreciate recs and state no further inpatient evaluation needed and EMG/NCV can be done as outpatient and has EMG/NCV ordered for 2/8/2019.   - Pain medication PRN and topical capsaicin to right upper extremity. Pain related to contracture sand improved with placement of right arm splint.   - Edema to right hand and arm related to immobility and improved with splint.   - awaing NH versus home care            Essential hypertension     · Patient's blood pressure controlled.    · Goal for blood pressure is SBP < 140 and DBP < 90 as " "patient > or = 60 years of age and patient is diabetic based on JNC 8 guidelines.   · Norvasc 10 mg po daily stopped ( new med started on this admit for HTN) on 12/22 as patient with SBP 93. Patient continued on home meds of Lisinopril 40 mg po daily (home med) and Coreg 25 mg po BID (home med) to treat and BP has been stable on this regimen after Norvasc stopped on 12/22 with no further hypotension. Patient to be discharge to NH on this regimen.  · Plan is to monitor patient's blood pressure routinely while patient is hospitalized.       Type 2 diabetes mellitus without complication, without long-term current use of insulin     Controlled in past 24 hours. Holding home meds of Sitagliptin and Metformin while patient in hospital and treating with insulin but plan to resume on discharge to NH SNF.  · Last HgA1C 6 % and at goal.  · Plan is to continue Levemir 6 units twice a day and Novolog 4 units with meals to treat in hospital but will discharge on Metformin and Sitagliptin  · SSI and accucheks  · Target pre-meal glucose goal is <140 with all random glucoses <180 in non-critically ill patient.      Vitamin B12 deficiency anemia     - Controlled and stable.   - 11/23: B12 175, Ferritin 90, Transferrin 185, Sat Fe: 13, Fe: 35, TIBC: 274  - Patient started on B12 supplement at Vitamin B12 1000 mcg po daily as outpatient in 11/2018 and repeat Vitamin B12 level on this admit 1062. Patient to continue oral Vitamin B12 supplementation on hospital discharge.   - Folate level normal.       Pure hypercholesterolemia     Controlled. Continue Lipitor 40 mg po daily to treat on discharge.       Localized edema     R hand edema and weakness, pain improved  Unclear exact etiology  MRI brain ana maria howed "No evidence of acute intracranial pathology"  US showed "No thrombus in central veins of the right upper extremity."  Pt was supposed to have outpt EMG done by neurology  PT and OT  Pain medication PRN  Topical capsaicin     "       Current Facility-Administered Medications:     acetaminophen tablet 650 mg, 650 mg, Oral, Q4H PRN, Chrissy Ellington MD, 650 mg at 01/02/19 0252    atorvastatin tablet 40 mg, 40 mg, Oral, Daily, Chrissy Ellington MD, 40 mg at 01/05/19 0919    capsaicin 0.025 % cream, , Topical (Top), QID PRN, Unique Quispe PA-C    carvedilol tablet 25 mg, 25 mg, Oral, BID WM, Chrissy Ellington MD, 25 mg at 01/05/19 0924    cyanocobalamin tablet 1,000 mcg, 1,000 mcg, Oral, Daily, DANIELLA Quan-APRIL, 1,000 mcg at 01/05/19 0919    dextrose 50% injection 12.5 g, 12.5 g, Intravenous, PRN, DANIELLA Quan-C    dextrose 50% injection 25 g, 25 g, Intravenous, PRN, DANIELLA Quan-C    enoxaparin injection 40 mg, 40 mg, Subcutaneous, Daily, Yodit Issa MD, 40 mg at 01/04/19 1752    glucagon (human recombinant) injection 1 mg, 1 mg, Intramuscular, PRN, Unique Quispe PA-C    glucose chewable tablet 16 g, 16 g, Oral, PRN, DANIELLA Quan-C    glucose chewable tablet 24 g, 24 g, Oral, PRN, Unique Quispe PA-C    hydrALAZINE tablet 25 mg, 25 mg, Oral, Q8H PRN, DANIELLA Quan-C    HYDROcodone-acetaminophen  mg per tablet 1 tablet, 1 tablet, Oral, Q4H PRN, Shanelle Crook MD, 1 tablet at 01/05/19 1411    insulin aspart U-100 pen 0-5 Units, 0-5 Units, Subcutaneous, QID (AC + HS) PRN, Unique Quispe PA-C, 1 Units at 01/04/19 2057    insulin aspart U-100 pen 4 Units, 4 Units, Subcutaneous, TIDWM, Yodit Issa MD, 4 Units at 01/05/19 1328    insulin detemir U-100 pen 6 Units, 6 Units, Subcutaneous, BID, Yodit Issa MD, 6 Units at 01/05/19 0920    lisinopril tablet 40 mg, 40 mg, Oral, Daily, Chrissy Ellington MD, 40 mg at 01/05/19 0918    magnesium hydroxide 400 mg/5 ml suspension 2,400 mg, 30 mL, Oral, Daily PRN, Anjum Whitfield PA-C, 2,400 mg at 12/29/18 2038    magnesium oxide tablet 400 mg, 400 mg, Oral, Daily, Chrissy Ellington MD, 400 mg at 01/05/19  0918    polyethylene glycol packet 17 g, 17 g, Oral, Daily, Anjum Whitfield PA-C, 17 g at 01/05/19 0919    sodium chloride 0.9% flush 5 mL, 5 mL, Intravenous, PRN, Chrissy Ellington MD    DVT PPx: lovenox    Discharge plan and follow up: await nursing home NH placement. Medically ready for d/c.    Shanelle Crook MD  Hospital Medicine Staff  [phone] pager

## 2019-01-06 NOTE — PLAN OF CARE
"Daily Progress Note:    Subjective: Still feels tired.  Overall better.    Flowsheet Rows         First Filed Value    Admission Height  65\" (165.1 cm) Documented at 02/03/2017 1044    Admission Weight  243 lb (110 kg) Documented at 02/03/2017 1044          Patient Vitals for the past 24 hrs:   BP Temp Temp src Pulse Resp SpO2   02/09/17 0643 129/84 97.5 °F (36.4 °C) Oral 90 20 95 %   02/09/17 0300 121/52 98.3 °F (36.8 °C) Oral 85 20 94 %   02/09/17 0002 97/64 98.3 °F (36.8 °C) Oral 70 18 95 %   02/08/17 2012 - - - 90 16 98 %   02/08/17 1945 135/88 98.6 °F (37 °C) Oral 90 18 93 %   02/08/17 1643 - - - 78 16 97 %   02/08/17 1636 120/70 98.5 °F (36.9 °C) Oral 82 20 94 %   02/08/17 1606 118/70 97.3 °F (36.3 °C) Oral 79 20 96 %   02/08/17 1544 138/73 99 °F (37.2 °C) Oral 75 20 92 %   02/08/17 1219 - - - 61 24 97 %   02/08/17 1149 144/75 97.6 °F (36.4 °C) Oral 61 16 95 %   02/08/17 0819 - - - (!) 123 20 96 %         Intake/Output Summary (Last 24 hours) at 02/09/17 0705  Last data filed at 02/09/17 0643   Gross per 24 hour   Intake   2085 ml   Output      0 ml   Net   2085 ml       Review of Systems   Constitutional: Positive for fatigue. Negative for appetite change.   Respiratory: Negative for chest tightness and wheezing.    Cardiovascular: Negative for chest pain.   Gastrointestinal: Negative for abdominal distention, abdominal pain, nausea and vomiting.   Genitourinary: Negative for frequency.   Skin: Negative for rash.   Psychiatric/Behavioral: Negative for agitation.       Physical Exam   Constitutional: She appears well-developed and well-nourished.   HENT:   Head: Normocephalic.   Mouth/Throat: Oropharynx is clear and moist.   Eyes: Conjunctivae are normal.   Neck: Normal range of motion. No JVD present. No thyromegaly present.   Cardiovascular: Normal rate.  An irregularly irregular rhythm present.   Murmur heard.   Systolic murmur is present with a grade of 2/6   Pulmonary/Chest: Effort normal. No respiratory " Problem: Adult Inpatient Plan of Care  Goal: Plan of Care Review  Outcome: Ongoing (interventions implemented as appropriate)  Safety measures maintained this shift. VSS on RA.  Applied prn medication and gave oral prn medication for pain. Incontinence care provided. Assisted with meals. Bed in lowest locked position and call light within reach. NAD noted. Will continue to monitor.           distress. She has decreased breath sounds in the right lower field and the left lower field. She has no wheezes. She has no rales.   Abdominal: Soft. She exhibits no distension. There is no tenderness. There is no guarding.       Colostomy present   Neurological: She is alert.   Skin: Skin is warm and dry. No rash noted.   Nursing note and vitals reviewed.          Medication Review:   I have reviewed the patient's current medication list      acetaminophen 650 mg Oral Nightly   allopurinol 100 mg Oral Q12H   budesonide 0.5 mg Nebulization BID - RT   calcium-vitamin D 500 mg Oral Q12H   conjugated estrogens 0.75 application Vaginal Once per day on Tue   diltiaZEM  mg Oral Daily   donepezil 10 mg Oral Nightly   doxazosin 2 mg Oral Q12H   enoxaparin 1 mg/kg Subcutaneous Q12H   famotidine 40 mg Oral Nightly   guaiFENesin 1,200 mg Oral BID   [START ON 2/12/2017] ibandronate 150 mg Oral Q30 Days   ipratropium-albuterol 3 mL Nebulization 4x Daily - RT   iron polysaccharides 150 mg Oral BID   iron sucrose (VENOFER) IVPB 200 mg Intravenous Q24H   isosorbide dinitrate 10 mg Oral Q12H   ketotifen 1 drop Both Eyes BID   lactobacillus acidophilus 1 capsule Oral Daily   levETIRAcetam 500 mg Oral Q12H   levoFLOXacin 750 mg Intravenous Q24H   levothyroxine 150 mcg Oral Q AM   metoprolol tartrate 100 mg Oral Q12H   miconazole  Topical Q12H   multivitamin with minerals 1 tablet Oral Daily   sennosides-docusate sodium 1 tablet Oral Q12H   sertraline 100 mg Oral Nightly   vitamin D 50,000 Units Oral Q7 Days           Labs:    Results from last 7 days  Lab Units 02/09/17  0434 02/08/17  0456 02/07/17  0449   WBC 10*3/mm3 7.32 7.80 9.27   HEMOGLOBIN g/dL 8.5* 7.8* 8.5*   HEMATOCRIT % 27.5* 25.3* 27.5*   PLATELETS 10*3/mm3 194 190 202       Results from last 7 days  Lab Units 02/09/17  0434 02/08/17  0456 02/07/17  0449   SODIUM mmol/L 137 138 138   POTASSIUM mmol/L 4.0 3.6 4.0   CHLORIDE mmol/L 103 102 103   TOTAL CO2 mmol/L 25.6  26.3 25.9   BUN mg/dL 17 20 20   CREATININE mg/dL 0.80 0.90 0.89   CALCIUM mg/dL 7.9* 8.1* 8.3*   BILIRUBIN mg/dL  --   --  0.2   ALK PHOS U/L  --   --  173*   ALT (SGPT) U/L  --   --  46*   AST (SGOT) U/L  --   --  16   GLUCOSE mg/dL 120* 133* 142*           Lab Results (last 24 hours)     Procedure Component Value Units Date/Time    POC Glucose Fingerstick [[id number]]  (Normal) Collected:  02/08/17 0805    Specimen:  Blood Updated:  02/08/17 0811     Glucose 121 mg/dL     Narrative:       Meter: [id number] : [id number] Jt Zuniga RN    Blood Culture [[id number]]  (Normal) Collected:  02/03/17 1731    Specimen:  Blood from Arm, Left Updated:  02/08/17 1801     Blood Culture No growth at 5 days     Blood Culture [[id number]]  (Normal) Collected:  02/03/17 1731    Specimen:  Blood from Arm, Right Updated:  02/08/17 1801     Blood Culture No growth at 5 days     Retic With IRF & RET-He [[id number]]  (Abnormal) Collected:  02/08/17 1750    Specimen:  Blood Updated:  02/08/17 1801     Immature Reticulocyte Fraction 23.4 (H) %      Reticulocyte % 1.71 (H) %      Reticulated Hgb 29.0 (L) pg     Iron Profile [[id number]]  (Abnormal) Collected:  02/08/17 1750    Specimen:  Blood Updated:  02/08/17 1834     Iron 188 (H) mcg/dL      Iron Saturation -- %       Unable to calculate        UIBC <16 (L) mcg/dL      TIBC -- mcg/dL       Unable to calculate       Vitamin B12 [[id number]]  (Abnormal) Collected:  02/08/17 1750    Specimen:  Blood Updated:  02/08/17 1846     Vitamin B-12 1023 (H) pg/mL     Ferritin [[id number]]  (Normal) Collected:  02/08/17 1750    Specimen:  Blood Updated:  02/08/17 2227     Ferritin 147.50 ng/mL     Folate [[id number]]  (Normal) Collected:  02/08/17 1750    Specimen:  Blood Updated:  02/08/17 2236     Folate >20.00 ng/mL     CBC (No Diff) [[id number]]  (Abnormal) Collected:  02/09/17 0434    Specimen:  Blood Updated:  02/09/17 0442     WBC 7.32 10*3/mm3      RBC 2.91 (L) 10*6/mm3      Hemoglobin 8.5 (L) g/dL       Hematocrit 27.5 (L) %      MCV 94.5 fL      MCH 29.2 pg      MCHC 30.9 (L) g/dL      RDW 15.2 (H) %      RDW-SD 53.3 fl      MPV 10.0 fL      Platelets 194 10*3/mm3     Basic Metabolic Panel [[id number]]  (Abnormal) Collected:  02/09/17 0434    Specimen:  Blood Updated:  02/09/17 0501     Glucose 120 (H) mg/dL      BUN 17 mg/dL      Creatinine 0.80 mg/dL      Sodium 137 mmol/L      Potassium 4.0 mmol/L      Chloride 103 mmol/L      CO2 25.6 mmol/L      Calcium 7.9 (L) mg/dL      eGFR Non African Amer 70 mL/min/1.73      BUN/Creatinine Ratio 21.3      Anion Gap 8.4 mmol/L     Narrative:       The MDRD GFR formula is only valid for adults with stable renal function between ages 18 and 70.              Radiology:  Imaging Results (last 24 hours)     ** No results found for the last 24 hours. **          Cardiology:  ECG/EMG Results (last 24 hours)     ** No results found for the last 24 hours. **          I have reviewed consult notes.    Assessment and Plan:          1.  Sepsis secondary to Haemophilus influenza pneumonia improving.    2.  Urinary tract infection secondary to ESBL on appropriate antibiotic coverage as been ordered we'll continue monitor no changes    3.  Hospital-acquired pneumonia secondary to Haemophilus influenza shows persistent infiltrates continue present antibiotics    4.  Atrial flutter on amiodarone drip rate better controlled on by mouth Cardizem and beta blocker.  Heart rate is better.  Continue Lovenox for now because of persistent anemia.  Hematology note noted.  Not sure about long-term anticoagulation will discuss with cardiology    5.  Acute kidney injury on chronic kidney disease resolved.     6.  Anemia iron deficiency hemoglobin gradually continues to decrease and iron levels low.  Hemoglobin 8.5 after 1 unit transfusion yesterday.    7.  Hypertension well controlled continue present management no changes    8.  Diabetes mellitus well controlled Accu-Cheks and continue sliding  scale insulin    9.  Acute hypoxic respiratory failure secondary to pneumonia still requiring O2 supplementation     10.  Coronary disease without any evidence of angina continue present management

## 2019-01-06 NOTE — PLAN OF CARE
Problem: Adult Inpatient Plan of Care  Goal: Plan of Care Review  Outcome: Ongoing (interventions implemented as appropriate)  Patient remains free from fall with injury.  Assisted with meals, turning and changing diapers throughout the day.  Tolerated well.  Family members visited.  Will continue to monitor.

## 2019-01-06 NOTE — PROGRESS NOTES
Hospital Medicine   Progress note   Team: [facility] [place] HOSP MED Z Tessie Mai MD   Admit Date: 12/14/2018   ANNITA 1/7/2019   Code status: Full Code   Principal Problem: Debility     Interval hx: No new issues, continue to await placement in snf SNF. See SW note. She is very eager for placement.    ROS    Respiratory: negative for cough, shortness of breath   Cardiovascular: negative for chest discomfort, palpitations   Gastrointestinal: negative for nausea or vomiting, abdominal pain, constipation, diarrhea     PEx   Temp:  [98 °F (36.7 °C)-98.8 °F (37.1 °C)]   Pulse:  [66-71]   Resp:  [16-18]   BP: (108-155)/(58-77)   SpO2:  [96 %-98 %]      I & O (Last 24H):     Intake/Output Summary (Last 24 hours) at 1/6/2019 1046  Last data filed at 1/5/2019 1200  Gross per 24 hour   Intake 240 ml   Output --   Net 240 ml       General Appearance: thin, elderly lady, supine  Heart: regular rate and rhythm   Respiratory: Normal respiratory effort, no crackles   Abdomen: Soft, non-tender; bowel sounds active   Skin: intact. IV sites ok   Neurologic: No focal numbness or weakness   Mental status: Alert, oriented x 4, affect appropriate, memory intact   Right UE: TTP, swollen with contracture of R fingers and elbow, very limited active movement of R arm or hand, some passive ROM but limited by pain      Recent Labs   Lab 01/05/19  0835 01/05/19  1328 01/05/19  1340 01/05/19  1827 01/05/19  2054 01/06/19  0917   POCTGLUCOSE 122* 130* 152* 149* 189* 131*            Active Hospital Problems    Diagnosis  POA    *Debility [R53.81]  Yes    Immobility [Z74.09]  Yes     Priority: 1 - High    Essential hypertension [I10]  Yes     Priority: 2     Type 2 diabetes mellitus without complication, without long-term current use of insulin [E11.9]  Yes     Priority: 3     Vitamin B12 deficiency anemia [D51.9]  Yes     Priority: 4     Pure hypercholesterolemia [E78.00]  Yes     Priority: 5     Contracture of muscle, right upper arm [M62.421]  " Yes    Weakness of both lower extremities [R29.898]  Yes    Gait disturbance [R26.9]  Yes    Localized edema [R60.0]  Yes      Resolved Hospital Problems   No resolved problems to display.        Overview:  84 YO lady with HTN, HLD, Type 2 diabetes on oral hypoglycemics and osteoarthritis who presented with complaint of persistant right arm weakness and right hand swelling for past 5 months. She denies injury or trauma. She has weakness of the entire RUE. She has difficulty with active shoulder, elbow, wrist, and finger motion. She has flexion contracture of the fingers. She notes edema of the hand which is improved with elevation. She is able to passively extend them to some degree however limited by pain.  She denies notable numbness or tingling. Confusion, trauma, swelling in any other extremities any rashes, fever or chills, neck pain,  hx of known CVA. She was evaluated by Orthopedics in past with  prior x-rays which are unremarkable. So she was sent to Neurology for further evaluation and recommended an EMG. On presentation to ED, she had an MRI brain done that showed " No evidence of acute intracranial pathology.Moderate generalized cerebral volume loss and moderate chronic microvascular ischemic changes" and US of RUE that showed "No thrombus in central veins of the right upper extremity."   Of note pt has been bed bound and not ambulatory for months (no clear inciting events) she has worked with PT and OT in past for her knee pain but has progressively has become bed bound and relies on her family for her ADLs. Family reports that they can no longer meet all her need and are requesting admission for placement to nursing home.      Hospital Course:  Mrs. Cheung was admitted for nursing home placement and Neurology evaluation as to cause of progressive immobility and loss of use of right arm and leg weakness. H/H at baseline with anemia. B12 low, started on supplement. Patient hypertensive in absence of " pain and started on Norvasc 10 mg po daily to treat. Neurology consulted, MRI of C-spine ordered and returned with only mild DJD of cervical spine but no spinal stenosis and does not explain patient's neurologic symptoms. EMG and NCV ordered on 12/18 to further work-up cause of progressive weakness. PT/OT consulted, recommended SNF placement and  working with family on placement choices. Pain to right arm improved with placement of splint to right hand and forearm to help with contractures. CPK normal at 61. Repeat MRI of thoracic and cervical spine with contrast done on 12/19 as per Neurology recs to make sure no structural cause of neurologic symptoms and repeat MRI unremarkable for any significant disc disease or spinal stenosis to explain patient's neurologic symptoms. Neurology states NCV/EMG can be done as outpatient as low suspicion for motor neuron disease.      PT/OT evaluated the patient.  We tried for SNF placement, but due to the more chronic nature of her disease, [facility] has denied SNF.  PEER to PEER done without change.  Pt will either need to go home with family and 24 hour care or [facility] nursing care.  Social work assisting.   Medically ready for discharge, awaiting placement.  [facility] denied SNF as pt is at baseline and not improving with therapy (per peer to peer by Dr Mai). Will need [facility] nursing home or home care with home health and 24 hour care. Social work working with family. Family unable to take care of her at home, so pursing [facility] NH placement.     Assessment and Plan for problems addressed today:     Immobility     Gait disturbance  Localized edema of right hand  Contracture of muscle, right upper arm  Weakness of both lower extremities     - Condition unchanged since admit.   - Patient with progressive immobility and weak over past few months starting five months ago, worsening over past two months. Patient with flexion contracture of right hand, along with right  "hand edema and weakness as well as "tingling" pain in right elbow over the past few months that has recently worsened. Has seen orthopedics as outpatient with unremarkable Radiology.  - EMG/NCV ordered on 12/18 to further evaluate cause of RUE contractures and leg weakness as per Neurology recs but Neurology states can be done as outpatient as low suspicion for motor neuron disease.  - MRI brain done showed "No evidence of acute intracranial pathology"  - US of RUE showed "No thrombus in central veins of the right upper extremity."  - MRI of cervical spine "Mild DJD of C-spine but no stenosis". Repeat MRI of thoracic and cervical spine on 12/19 with contrast with no stenosis or significant disc disease so no structural cause for right arm or leg weakness.   - CPK normal at 61  - Neurology following and appreciate recs and state no further inpatient evaluation needed and EMG/NCV can be done as outpatient and has EMG/NCV ordered for 2/8/2019.   - Pain medication PRN and topical capsaicin to right upper extremity. Pain related to contracture sand improved with placement of right arm splint.   - Edema to right hand and arm related to immobility and improved with splint.   - awaing NH versus home care       Essential hypertension     · Patient's blood pressure controlled.    · Goal for blood pressure is SBP < 140 and DBP < 90 as patient > or = 60 years of age and patient is diabetic based on JNC 8 guidelines.   · Norvasc 10 mg po daily stopped ( new med started on this admit for HTN) on 12/22 as patient with SBP 93. Patient continued on home meds of Lisinopril 40 mg po daily (home med) and Coreg 25 mg po BID (home med) to treat and BP has been stable on this regimen after Norvasc stopped on 12/22 with no further hypotension. Patient to be discharge to NH on this regimen.  · Plan is to monitor patient's blood pressure routinely while patient is hospitalized.       Type 2 diabetes mellitus without complication, without " "long-term current use of insulin     Controlled in past 24 hours. Holding home meds of Sitagliptin and Metformin while patient in hospital and treating with insulin but plan to resume on discharge to NH SNF.  · Last HgA1C 6 % and at goal.  · Plan is to continue Levemir 6 units twice a day and Novolog 4 units with meals to treat in hospital but will discharge on Metformin and Sitagliptin  · SSI and accucheks  · Target pre-meal glucose goal is <140 with all random glucoses <180 in non-critically ill patient.      Vitamin B12 deficiency anemia     - Controlled and stable.   - 11/23: B12 175, Ferritin 90, Transferrin 185, Sat Fe: 13, Fe: 35, TIBC: 274  - Patient started on B12 supplement at Vitamin B12 1000 mcg po daily as outpatient in 11/2018 and repeat Vitamin B12 level on this admit 1062. Patient to continue oral Vitamin B12 supplementation on hospital discharge.   - Folate level normal.       Pure hypercholesterolemia     Controlled. Continue Lipitor 40 mg po daily to treat on discharge.       Localized edema     R hand edema and weakness, pain improved  Unclear exact etiology  MRI brain ana maria howed "No evidence of acute intracranial pathology"  US showed "No thrombus in central veins of the right upper extremity."  Pt was supposed to have outpt EMG done by neurology  PT and OT  Pain medication PRN  Topical capsaicin           DVT PPx: lovenox    Discharge plan and follow up: await residential NH placement. Medically ready for d/c.    Tessie Mai MD  Hospital Medicine Staff  [phone] pager      "

## 2019-01-06 NOTE — PLAN OF CARE
Problem: Adult Inpatient Plan of Care  Goal: Plan of Care Review  Outcome: Ongoing (interventions implemented as appropriate)  No signs of distress.  Applied prn medication and gave prn medication for pain.  No further complaints this shift.

## 2019-01-07 NOTE — PLAN OF CARE
Problem: Adult Inpatient Plan of Care  Goal: Plan of Care Review  Outcome: Ongoing (interventions implemented as appropriate)  Pt resting in bed, able to make needs known, pt cleaned and changed for incont., medicated for pain, repositioned throughout night, call light in reach, side rails up x 2 for safety, will cont to monitor pt.

## 2019-01-07 NOTE — PLAN OF CARE
CASTILLO following for DC needs. CASTILLO in communication with CM.    CASTILLO spoke to Kayli ([phone]) this morning. CASTILLO informed Kayli that [facility] medically accepted the patient. Kayli stated that she is going to inform the family of the acceptance.     CASTILLO called [facility] ([phone]) to follow up on referral. Admissions was in a meeting. CASTILLO left a message for a return call.     CASTILLO spoke to Elaine at [facility] ([phone]). Elaine stated that the patient is clinically accepted.    CASTILLO informed Kayli of the acceptance. Kayli stated that they visited [facility] on Friday and did not care for the facility. Kayli stated that their first choice is [facility]. CASTILLO informed Kayli that CASTILLO will let her know as soon as Lafon calls CASTILLO back.     UPDATE 11:37 AM  CASTILLO received call from MsFely Barnhart at [facility]. Ms. Rossd stated that she received the referral and she will have the  review it and have an answer by the end of today.     Maria Del Rosario Umanzor, CASTILLO  [facility]  [id number]

## 2019-01-07 NOTE — PROGRESS NOTES
Hospital Medicine   Progress Note     Team: [facility]OSP MED Z Tessie Mai MD   Admit Date: 12/14/2018   ANNITA 1/7/2019   Code status: Full Code   Principal Problem: Debility     Interval hx: No new issues, continue to await placement in [facility]. See SW note. She is very eager for placement.    ROS    Respiratory: negative for cough, shortness of breath   Cardiovascular: negative for chest discomfort, palpitations   Gastrointestinal: negative for nausea or vomiting, abdominal pain, constipation, diarrhea     PEx   Temp:  [98.7 °F (37.1 °C)-98.9 °F (37.2 °C)]   Pulse:  [57-76]   Resp:  [16-18]   BP: (130-149)/(60-75)   SpO2:  [97 %-99 %]      I & O (Last 24H):     Intake/Output Summary (Last 24 hours) at 1/7/2019 1639  Last data filed at 1/7/2019 1244  Gross per 24 hour   Intake 240 ml   Output --   Net 240 ml       General Appearance: thin, elderly lady, supine  Heart: regular rate and rhythm   Respiratory: Normal respiratory effort, no crackles   Abdomen: Soft, non-tender; bowel sounds active   Skin: intact. IV sites ok   Neurologic: No focal numbness or weakness   Mental status: Alert, oriented x 4, affect appropriate, memory intact   Right UE: TTP, swollen with contracture of R fingers and elbow, very limited active movement of R arm or hand, some passive ROM but limited by pain      Recent Labs   Lab 01/06/19  1453 01/06/19  1842 01/06/19 2011 01/07/19  0730 01/07/19  1158 01/07/19  1623   POCTGLUCOSE 89 106 133* 82 154* 92            Active Hospital Problems    Diagnosis  POA    *Debility [R53.81]  Yes    Immobility [Z74.09]  Yes     Priority: 1 - High    Essential hypertension [I10]  Yes     Priority: 2     Type 2 diabetes mellitus without complication, without long-term current use of insulin [E11.9]  Yes     Priority: 3     Vitamin B12 deficiency anemia [D51.9]  Yes     Priority: 4     Pure hypercholesterolemia [E78.00]  Yes     Priority: 5     Contracture of muscle, right upper arm [M62.421]   "Yes    Weakness of both lower extremities [R29.898]  Yes    Gait disturbance [R26.9]  Yes    Localized edema [R60.0]  Yes      Resolved Hospital Problems   No resolved problems to display.        Overview:  84 YO lady with HTN, HLD, Type 2 diabetes on oral hypoglycemics and osteoarthritis who presented with complaint of persistant right arm weakness and right hand swelling for past 5 months. She denies injury or trauma. She has weakness of the entire RUE. She has difficulty with active shoulder, elbow, wrist, and finger motion. She has flexion contracture of the fingers. She notes edema of the hand which is improved with elevation. She is able to passively extend them to some degree however limited by pain.  She denies notable numbness or tingling. Confusion, trauma, swelling in any other extremities any rashes, fever or chills, neck pain,  hx of known CVA. She was evaluated by Orthopedics in past with  prior x-rays which are unremarkable. So she was sent to Neurology for further evaluation and recommended an EMG. On presentation to ED, she had an MRI brain done that showed " No evidence of acute intracranial pathology.Moderate generalized cerebral volume loss and moderate chronic microvascular ischemic changes" and US of RUE that showed "No thrombus in central veins of the right upper extremity."   Of note pt has been bed bound and not ambulatory for months (no clear inciting events) she has worked with PT and OT in past for her knee pain but has progressively has become bed bound and relies on her family for her ADLs. Family reports that they can no longer meet all her need and are requesting admission for placement to nursing home.      Hospital Course:  Mrs. Cheung was admitted for nursing home placement and Neurology evaluation as to cause of progressive immobility and loss of use of right arm and leg weakness. H/H at baseline with anemia. B12 low, started on supplement. Patient hypertensive in absence of " pain and started on Norvasc 10 mg po daily to treat. Neurology consulted, MRI of C-spine ordered and returned with only mild DJD of cervical spine but no spinal stenosis and does not explain patient's neurologic symptoms. EMG and NCV ordered on 12/18 to further work-up cause of progressive weakness. PT/OT consulted, recommended SNF placement and  working with family on placement choices. Pain to right arm improved with placement of splint to right hand and forearm to help with contractures. CPK normal at 61. Repeat MRI of thoracic and cervical spine with contrast done on 12/19 as per Neurology recs to make sure no structural cause of neurologic symptoms and repeat MRI unremarkable for any significant disc disease or spinal stenosis to explain patient's neurologic symptoms. Neurology states NCV/EMG can be done as outpatient as low suspicion for motor neuron disease.      PT/OT evaluated the patient.  We tried for SNF placement, but due to the more chronic nature of her disease, [facility] has denied SNF.  PEER to PEER done without change.  Pt will either need to go home with family and 24 hour care or shelter nursing care.  Social work assisting.   Medically ready for discharge, awaiting placement.  [facility] denied SNF as pt is at baseline and not improving with therapy (per peer to peer by Dr Mai). Will need shelter nursing home or home care with home health and 24 hour care. Social work working with family. Family unable to take care of her at home, so pursing shelter NH placement.     Assessment and Plan for problems addressed today:     Immobility     Gait disturbance  Localized edema of right hand  Contracture of muscle, right upper arm  Weakness of both lower extremities     - Condition unchanged since admit.   - Patient with progressive immobility and weak over past few months starting five months ago, worsening over past two months. Patient with flexion contracture of right hand, along with right  "hand edema and weakness as well as "tingling" pain in right elbow over the past few months that has recently worsened. Has seen orthopedics as outpatient with unremarkable Radiology.  - EMG/NCV ordered on 12/18 to further evaluate cause of RUE contractures and leg weakness as per Neurology recs but Neurology states can be done as outpatient as low suspicion for motor neuron disease.  - MRI brain done showed "No evidence of acute intracranial pathology"  - US of RUE showed "No thrombus in central veins of the right upper extremity."  - MRI of cervical spine "Mild DJD of C-spine but no stenosis". Repeat MRI of thoracic and cervical spine on 12/19 with contrast with no stenosis or significant disc disease so no structural cause for right arm or leg weakness.   - CPK normal at 61  - Neurology following and appreciate recs and state no further inpatient evaluation needed and EMG/NCV can be done as outpatient and has EMG/NCV ordered for 2/8/2019.   - Pain medication PRN and topical capsaicin to right upper extremity. Pain related to contracture sand improved with placement of right arm splint.   - Edema to right hand and arm related to immobility and improved with splint.   - awaing NH versus home care       Essential hypertension     · Patient's blood pressure controlled.    · Goal for blood pressure is SBP < 140 and DBP < 90 as patient > or = 60 years of age and patient is diabetic based on JNC 8 guidelines.   · Norvasc 10 mg po daily stopped ( new med started on this admit for HTN) on 12/22 as patient with SBP 93. Patient continued on home meds of Lisinopril 40 mg po daily (home med) and Coreg 25 mg po BID (home med) to treat and BP has been stable on this regimen after Norvasc stopped on 12/22 with no further hypotension. Patient to be discharge to NH on this regimen.  · Plan is to monitor patient's blood pressure routinely while patient is hospitalized.       Type 2 diabetes mellitus without complication, without " "long-term current use of insulin     Controlled in past 24 hours. Holding home meds of Sitagliptin and Metformin while patient in hospital and treating with insulin but plan to resume on discharge to NH SNF.  · Last HgA1C 6 % and at goal.  · Plan is to continue Levemir 6 units twice a day and Novolog 4 units with meals to treat in hospital but will discharge on Metformin and Sitagliptin  · SSI and accucheks  · Target pre-meal glucose goal is <140 with all random glucoses <180 in non-critically ill patient.      Vitamin B12 deficiency anemia     - Controlled and stable.   - 11/23: B12 175, Ferritin 90, Transferrin 185, Sat Fe: 13, Fe: 35, TIBC: 274  - Patient started on B12 supplement at Vitamin B12 1000 mcg po daily as outpatient in 11/2018 and repeat Vitamin B12 level on this admit 1062. Patient to continue oral Vitamin B12 supplementation on hospital discharge.   - Folate level normal.       Pure hypercholesterolemia     Controlled. Continue Lipitor 40 mg po daily to treat on discharge.       Localized edema     R hand edema and weakness, pain improved  Unclear exact etiology  MRI brain ana maria howed "No evidence of acute intracranial pathology"  US showed "No thrombus in central veins of the right upper extremity."  Pt was supposed to have outpt EMG done by neurology  PT and OT  Pain medication PRN  Topical capsaicin           DVT PPx: lovenox    Discharge plan and follow up: await [facility] NH placement. Medically ready for d/c.    Tessie Mai MD  Hospital Medicine Staff  [phone] pager      " orthopnea/dyspnea on exertion/See HPI

## 2019-01-08 NOTE — PROGRESS NOTES
Hospital Medicine   Progress Note     Team: [facility] MED Z Tessie Mai MD   Admit Date: 12/14/2018   ANNITA 1/7/2019   Code status: Full Code   Principal Problem: Debility     Interval hx: No new issues, continue to await placement in senior living NH. See SW note. She is very eager for placement.    ROS    Respiratory: negative for cough, shortness of breath   Cardiovascular: negative for chest discomfort, palpitations   Gastrointestinal: negative for nausea or vomiting, abdominal pain, constipation, diarrhea     PEx   Temp:  [98.5 °F (36.9 °C)-98.9 °F (37.2 °C)]   Pulse:  [65-75]   Resp:  [17-18]   BP: (124-146)/(60-85)   SpO2:  [96 %-100 %]      I & O (Last 24H):   No intake or output data in the 24 hours ending 01/08/19 1309    General Appearance: thin, elderly lady, supine  Heart: regular rate and rhythm   Respiratory: Normal respiratory effort, no crackles   Abdomen: Soft, non-tender; bowel sounds active   Skin: intact. IV sites ok   Neurologic: No focal numbness or weakness   Mental status: Alert, oriented x 4, affect appropriate, memory intact   Right UE: TTP, swollen with contracture of R fingers and elbow, very limited active movement of R arm or hand, some passive ROM but limited by pain      Recent Labs   Lab 01/06/19 2011 01/07/19  0730 01/07/19  1158 01/07/19  1623 01/07/19  2038 01/08/19  0832   POCTGLUCOSE 133* 82 154* 92 271* 151*            Active Hospital Problems    Diagnosis  POA    *Debility [R53.81]  Yes    Immobility [Z74.09]  Yes     Priority: 1 - High    Essential hypertension [I10]  Yes     Priority: 2     Type 2 diabetes mellitus without complication, without long-term current use of insulin [E11.9]  Yes     Priority: 3     Vitamin B12 deficiency anemia [D51.9]  Yes     Priority: 4     Pure hypercholesterolemia [E78.00]  Yes     Priority: 5     Contracture of muscle, right upper arm [M62.421]  Yes    Weakness of both lower extremities [R29.898]  Yes    Gait disturbance [R26.9]   "Yes    Localized edema [R60.0]  Yes      Resolved Hospital Problems   No resolved problems to display.        Overview:  84 YO lady with HTN, HLD, Type 2 diabetes on oral hypoglycemics and osteoarthritis who presented with complaint of persistant right arm weakness and right hand swelling for past 5 months. She denies injury or trauma. She has weakness of the entire RUE. She has difficulty with active shoulder, elbow, wrist, and finger motion. She has flexion contracture of the fingers. She notes edema of the hand which is improved with elevation. She is able to passively extend them to some degree however limited by pain.  She denies notable numbness or tingling. Confusion, trauma, swelling in any other extremities any rashes, fever or chills, neck pain,  hx of known CVA. She was evaluated by Orthopedics in past with  prior x-rays which are unremarkable. So she was sent to Neurology for further evaluation and recommended an EMG. On presentation to ED, she had an MRI brain done that showed " No evidence of acute intracranial pathology.Moderate generalized cerebral volume loss and moderate chronic microvascular ischemic changes" and US of RUE that showed "No thrombus in central veins of the right upper extremity."   Of note pt has been bed bound and not ambulatory for months (no clear inciting events) she has worked with PT and OT in past for her knee pain but has progressively has become bed bound and relies on her family for her ADLs. Family reports that they can no longer meet all her need and are requesting admission for placement to nursing home.      Hospital Course:  Mrs. Cheung was admitted for nursing home placement and Neurology evaluation as to cause of progressive immobility and loss of use of right arm and leg weakness. H/H at baseline with anemia. B12 low, started on supplement. Patient hypertensive in absence of pain and started on Norvasc 10 mg po daily to treat. Neurology consulted, MRI of C-spine " "ordered and returned with only mild DJD of cervical spine but no spinal stenosis and does not explain patient's neurologic symptoms. EMG and NCV ordered on 12/18 to further work-up cause of progressive weakness. PT/OT consulted, recommended SNF placement and  working with family on placement choices. Pain to right arm improved with placement of splint to right hand and forearm to help with contractures. CPK normal at 61. Repeat MRI of thoracic and cervical spine with contrast done on 12/19 as per Neurology recs to make sure no structural cause of neurologic symptoms and repeat MRI unremarkable for any significant disc disease or spinal stenosis to explain patient's neurologic symptoms. Neurology states NCV/EMG can be done as outpatient as low suspicion for motor neuron disease.      PT/OT evaluated the patient.  We tried for SNF placement, but due to the more chronic nature of her disease, [facility] has denied SNF.  PEER to PEER done without change.  Pt will either need to go home with family and 24 hour care or [facility] nursing care.  Social work assisting.   Medically ready for discharge, awaiting placement.  [facility] denied SNF as pt is at baseline and not improving with therapy (per peer to peer by Dr Mai). Will need [facility] nursing home or home care with home health and 24 hour care. Social work working with family. Family unable to take care of her at home, so pursing [facility] NH placement.     Assessment and Plan for problems addressed today:     Immobility     Gait disturbance  Localized edema of right hand  Contracture of muscle, right upper arm  Weakness of both lower extremities     - Condition unchanged since admit.   - Patient with progressive immobility and weak over past few months starting five months ago, worsening over past two months. Patient with flexion contracture of right hand, along with right hand edema and weakness as well as "tingling" pain in right elbow over the past few months " "that has recently worsened. Has seen orthopedics as outpatient with unremarkable Radiology.  - EMG/NCV ordered on 12/18 to further evaluate cause of RUE contractures and leg weakness as per Neurology recs but Neurology states can be done as outpatient as low suspicion for motor neuron disease.  - MRI brain done showed "No evidence of acute intracranial pathology"  - US of RUE showed "No thrombus in central veins of the right upper extremity."  - MRI of cervical spine "Mild DJD of C-spine but no stenosis". Repeat MRI of thoracic and cervical spine on 12/19 with contrast with no stenosis or significant disc disease so no structural cause for right arm or leg weakness.   - CPK normal at 61  - Neurology following and appreciate recs and state no further inpatient evaluation needed and EMG/NCV can be done as outpatient and has EMG/NCV ordered for 2/8/2019.   - Pain medication PRN and topical capsaicin to right upper extremity. Pain related to contracture sand improved with placement of right arm splint.   - Edema to right hand and arm related to immobility and improved with splint.   - awaing NH versus home care       Essential hypertension     · Patient's blood pressure controlled.    · Goal for blood pressure is SBP < 140 and DBP < 90 as patient > or = 60 years of age and patient is diabetic based on JNC 8 guidelines.   · Norvasc 10 mg po daily stopped ( new med started on this admit for HTN) on 12/22 as patient with SBP 93. Patient continued on home meds of Lisinopril 40 mg po daily (home med) and Coreg 25 mg po BID (home med) to treat and BP has been stable on this regimen after Norvasc stopped on 12/22 with no further hypotension. Patient to be discharge to NH on this regimen.  · Plan is to monitor patient's blood pressure routinely while patient is hospitalized.       Type 2 diabetes mellitus without complication, without long-term current use of insulin     Controlled in past 24 hours. Holding home meds of " "Sitagliptin and Metformin while patient in hospital and treating with insulin but plan to resume on discharge to NH SNF.  · Last HgA1C 6 % and at goal.  · Plan is to continue Levemir 6 units twice a day and Novolog 4 units with meals to treat in hospital but will discharge on Metformin and Sitagliptin  · SSI and accucheks  · Target pre-meal glucose goal is <140 with all random glucoses <180 in non-critically ill patient.      Vitamin B12 deficiency anemia     - Controlled and stable.   - 11/23: B12 175, Ferritin 90, Transferrin 185, Sat Fe: 13, Fe: 35, TIBC: 274  - Patient started on B12 supplement at Vitamin B12 1000 mcg po daily as outpatient in 11/2018 and repeat Vitamin B12 level on this admit 1062. Patient to continue oral Vitamin B12 supplementation on hospital discharge.   - Folate level normal.       Pure hypercholesterolemia     Controlled. Continue Lipitor 40 mg po daily to treat on discharge.       Localized edema     R hand edema and weakness, pain improved  Unclear exact etiology  MRI brain ana maria howed "No evidence of acute intracranial pathology"  US showed "No thrombus in central veins of the right upper extremity."  Pt was supposed to have outpt EMG done by neurology  PT and OT  Pain medication PRN  Topical capsaicin           DVT PPx: lovenox    Discharge plan and follow up: await [facility] placement. Medically ready for d/c.    Tessie Mai MD  Hospital Medicine Staff  [phone] pager      "

## 2019-01-08 NOTE — PLAN OF CARE
CASTILLO following for DC needs. CASTILLO in communication with CM.    CASTILLO spoke to patient's daughter in law, Kayli, Kayli stated that she had not heard from [facility] regarding patient's placement.     CASTILLO called [facility] and spoke to Ms. Barnhart. Ms. Barnhart stated that the patient is medically accepted but they are having trouble when they search for the patient in their Medicare database using her social. Ms. Barnhart stated that she called the patient's daughter, Erum, to inquire if the patient uses her 's social but Erum did not know her father's social. Ms. Barnhart also stated that she called [facility] who could not help her. CASTILLO asked Ms. Barnhart to call patient's daughter, Kayli, and provided Ms. Barnhart with Kayli's number.     Maria Del Rosario Umanzor, CASTILLO  [facility]  [id number]

## 2019-01-08 NOTE — PLAN OF CARE
[facility]     Department of Hospital Medicine     [address] [place] [address]     [phone] after hours  [phone] fax       NURSING HOME ORDERS    01/08/2019    Admit to Nursing Home:  Nursing home                                            Diagnoses:  Active Hospital Problems    Diagnosis  POA    *Debility [R53.81]  Yes    Immobility [Z74.09]  Yes     Priority: 1 - High    Essential hypertension [I10]  Yes     Priority: 2     Type 2 diabetes mellitus without complication, without long-term current use of insulin [E11.9]  Yes     Priority: 3     Vitamin B12 deficiency anemia [D51.9]  Yes     Priority: 4     Pure hypercholesterolemia [E78.00]  Yes     Priority: 5     Contracture of muscle, right upper arm [M62.421]  Yes    Weakness of both lower extremities [R29.898]  Yes    Gait disturbance [R26.9]  Yes    Localized edema [R60.0]  Yes      Resolved Hospital Problems   No resolved problems to display.       Patient is homebound due to:  Debility    Allergies:  Review of patient's allergies indicates:   Allergen Reactions    Pcn [penicillins]        Vitals: Once weekly        Code Status: Full Code     Diet: diabetic diet: 2000 calorie with Thin liquids     Supplement: Glucerna or equivalent, one can with meals    Activities:   - Up in a chair each morning as tolerated   - Ambulate with assistance to bathroom   - May use walker, cane, or self-propelled wheelchair  - Keep right arm splint in place to help with right hand contracture and swelling.     LABS:  Per facility protocol      Nursing Precautions:       - Fall precautions per nursing home protocol      CONSULTS:     Physical Therapy to evaluate and treat 5 times a week     Occupational Therapy to evaluate and treat 5 times a week    WOUND CARE ORDERS  n/a              DIABETES CARE:   SN to perform and educate Diabetic management with blood glucose monitoring:, Fingerstick blood sugar  before meals and at bedtime and Report CBG < 60 or > 350 to physician.                                          Insulin Sliding Scale          Glucose  Novolog Insulin Subcutaneous        0 - 60   Orange juice or glucose tablet, hold insulin      No insulin   201-250  2 units   251-300  4 units   301-350  6 units   351-400  8 units   >400   10 units then call physician      Medications:     Current Discharge Medication List      START taking these medications    Details   acetaminophen (TYLENOL) 325 MG tablet Take 2 tablets (650 mg total) by mouth every 4 (four) hours as needed (Mild 1-3/10 pain or temperature > 101 F).  Refills: 0      capsaicin (ZOSTRIX) 0.025 % cream Apply topically 4 (four) times daily as needed (right arm pain).  Refills: 0      cyanocobalamin (VITAMIN B-12) 1000 MCG tablet Take 1 tablet (1,000 mcg total) by mouth once daily.      HYDROcodone-acetaminophen (NORCO) 5-325 mg per tablet Take 1 tablet by mouth every 4 (four) hours as needed (Moderate to severe pain).  Qty: 30 tablet, Refills: 0      insulin aspart U-100 (NOVOLOG) 100 unit/mL InPn pen Inject 0-5 Units into the skin before meals and at bedtime as needed (Hyperglycemia).  Refills: 0         CONTINUE these medications which have NOT CHANGED    Details   alendronate (FOSAMAX) 70 MG tablet Take 1 tablet (70 mg total) by mouth every 7 days.  Qty: 4 tablet, Refills: 11      aspirin (ECOTRIN) 81 MG EC tablet Take 81 mg by mouth once daily.      atorvastatin (LIPITOR) 40 MG tablet Take 40 mg by mouth once daily.      carvedilol (COREG) 25 MG tablet Take 1 tablet (25 mg total) by mouth 2 (two) times daily with meals.  Qty: 180 tablet, Refills: 3      lisinopril (PRINIVIL,ZESTRIL) 40 MG tablet Take 40 mg by mouth once daily.      magnesium oxide (MAG-OX) 400 mg (241.3 mg magnesium) tablet Take 400 mg by mouth once daily.      metformin (GLUCOPHAGE-XR) 500 MG 24 hr tablet Take 500 mg by mouth 2 (two) times daily with meals.       SITagliptin (JANUVIA) 50 MG Tab Take 100 mg by mouth once daily.              Follow-up:   Future Appointments   Date Time Provider Department Center   1/29/2019  9:00 AM Ivan Bajwa MD [facility] PB EPI [facility]   2/8/2019  1:30 PM [facility] EMG PROCEDURAL LAB [facility] NEURO [facility]   2/11/2019  2:20 PM Corwin Ortiz MD [facility]   2/26/2019 11:45 AM Virginia Aquino MD [facility]       _________________________________  Tessie Mai MD  01/08/2019

## 2019-01-08 NOTE — PLAN OF CARE
Problem: Adult Inpatient Plan of Care  Goal: Plan of Care Review  Outcome: Ongoing (interventions implemented as appropriate)  Patient alert and oriented. Patient resting quietly. Patient denies any complaint of pain or discomfort. Will continue to monitor.

## 2019-01-08 NOTE — PLAN OF CARE
Problem: Adult Inpatient Plan of Care  Goal: Rounds/Family Conference  Outcome: Ongoing (interventions implemented as appropriate)  Pt has been stable all day. Turns & checks done q2H. Pt did have 2 loose stools this shift. Miralax held this am. Skin CDI. Pt c/o pain to R shoulder. Administered hydrocodone x1 & capscasin cream x1. Withheld pm insulin for blood sugar of 92; didn't want to drop the patient's blood sugar for bedtime. Pt family visited at lunch & at dinner time. Plan for SNF placement to [facility]. Awaiting final approval.

## 2019-01-08 NOTE — PLAN OF CARE
Problem: Adult Inpatient Plan of Care  Goal: Plan of Care Review  Outcome: Ongoing (interventions implemented as appropriate)   01/08/19 0530   Plan of Care Review   Plan of Care Reviewed With patient   Progress improving   Pt resting in bed, medicated for pain x1, changed and turned pt., denies needs at this time, will cont to monitor pt.

## 2019-01-09 NOTE — PLAN OF CARE
CASTILLO following for DC needs. CASTILLO in communication with CM.    CASTILLO emailed admitting request to in regards to patient's SSN possibly being incorrect. Admitting request is currently working on this issue.     CASTILLO spoke to Ms. Barnhart from [facility]. Ms. Barnhart asked CASTILLO to fax her patient's PHN card. CASTILLO faxed card to [phone]; [facility] is currently down.     Maria Del Rosario Umanzor, CASTILLO  [facility]  [id number]

## 2019-01-09 NOTE — PROGRESS NOTES
Hospital Medicine   Progress Note     Team: [facility] Tessie Mai MD   Admit Date: 12/14/2018   ANNITA 1/7/2019   Code status: Full Code   Principal Problem: Debility     Interval hx: No new issues, continue to await placement in nursing home NH. See SW note. She is very eager for placement.    ROS    Respiratory: negative for cough, shortness of breath   Cardiovascular: negative for chest discomfort, palpitations   Gastrointestinal: negative for nausea or vomiting, abdominal pain, constipation, diarrhea     PEx   Temp:  [97.3 °F (36.3 °C)-99.2 °F (37.3 °C)]   Pulse:  [60-76]   Resp:  [1-18]   BP: (111-162)/(65-79)   SpO2:  [94 %-100 %]      I & O (Last 24H):   No intake or output data in the 24 hours ending 01/09/19 1034    General Appearance: thin, elderly lady, supine  Heart: regular rate and rhythm   Respiratory: Normal respiratory effort, no crackles   Abdomen: Soft, non-tender; bowel sounds active   Skin: intact. IV sites ok   Neurologic: No focal numbness or weakness   Mental status: Alert, oriented x 4, affect appropriate, memory intact   Right UE: TTP, swollen with contracture of R fingers and elbow, very limited active movement of R arm or hand, some passive ROM but limited by pain      Recent Labs   Lab 01/08/19  0832 01/08/19  1302 01/08/19  1706 01/08/19  2150 01/09/19  0946 01/09/19  1348   POCTGLUCOSE 151* 180* 96 163* 142* 59*            Active Hospital Problems    Diagnosis  POA    *Debility [R53.81]  Yes    Immobility [Z74.09]  Yes     Priority: 1 - High    Essential hypertension [I10]  Yes     Priority: 2     Type 2 diabetes mellitus without complication, without long-term current use of insulin [E11.9]  Yes     Priority: 3     Vitamin B12 deficiency anemia [D51.9]  Yes     Priority: 4     Pure hypercholesterolemia [E78.00]  Yes     Priority: 5     Contracture of muscle, right upper arm [M62.421]  Yes    Weakness of both lower extremities [R29.898]  Yes    Gait disturbance [R26.9]   "Yes    Localized edema [R60.0]  Yes      Resolved Hospital Problems   No resolved problems to display.        Overview:  84 YO lady with HTN, HLD, Type 2 diabetes on oral hypoglycemics and osteoarthritis who presented with complaint of persistant right arm weakness and right hand swelling for past 5 months. She denies injury or trauma. She has weakness of the entire RUE. She has difficulty with active shoulder, elbow, wrist, and finger motion. She has flexion contracture of the fingers. She notes edema of the hand which is improved with elevation. She is able to passively extend them to some degree however limited by pain.  She denies notable numbness or tingling. Confusion, trauma, swelling in any other extremities any rashes, fever or chills, neck pain,  hx of known CVA. She was evaluated by Orthopedics in past with  prior x-rays which are unremarkable. So she was sent to Neurology for further evaluation and recommended an EMG. On presentation to ED, she had an MRI brain done that showed " No evidence of acute intracranial pathology.Moderate generalized cerebral volume loss and moderate chronic microvascular ischemic changes" and US of RUE that showed "No thrombus in central veins of the right upper extremity."   Of note pt has been bed bound and not ambulatory for months (no clear inciting events) she has worked with PT and OT in past for her knee pain but has progressively has become bed bound and relies on her family for her ADLs. Family reports that they can no longer meet all her need and are requesting admission for placement to nursing home.      Hospital Course:  Mrs. Cheung was admitted for nursing home placement and Neurology evaluation as to cause of progressive immobility and loss of use of right arm and leg weakness. H/H at baseline with anemia. B12 low, started on supplement. Patient hypertensive in absence of pain and started on Norvasc 10 mg po daily to treat. Neurology consulted, MRI of C-spine " "ordered and returned with only mild DJD of cervical spine but no spinal stenosis and does not explain patient's neurologic symptoms. EMG and NCV ordered on 12/18 to further work-up cause of progressive weakness. PT/OT consulted, recommended SNF placement and  working with family on placement choices. Pain to right arm improved with placement of splint to right hand and forearm to help with contractures. CPK normal at 61. Repeat MRI of thoracic and cervical spine with contrast done on 12/19 as per Neurology recs to make sure no structural cause of neurologic symptoms and repeat MRI unremarkable for any significant disc disease or spinal stenosis to explain patient's neurologic symptoms. Neurology states NCV/EMG can be done as outpatient as low suspicion for motor neuron disease.      PT/OT evaluated the patient.  We tried for SNF placement, but due to the more chronic nature of her disease, PHN has denied SNF.  PEER to PEER done without change.  Pt will either need to go home with family and 24 hour care or alf nursing care.  Social work assisting.   Medically ready for discharge, awaiting placement.  PHN denied SNF as pt is at baseline and not improving with therapy (per peer to peer by Dr Mai). Will need alf nursing home or home care with home health and 24 hour care. Social work working with family. Family unable to take care of her at home, so pursing alf NH placement.     Assessment and Plan for problems addressed today:     Immobility     Gait disturbance  Localized edema of right hand  Contracture of muscle, right upper arm  Weakness of both lower extremities     - Condition unchanged since admit.   - Patient with progressive immobility and weak over past few months starting five months ago, worsening over past two months. Patient with flexion contracture of right hand, along with right hand edema and weakness as well as "tingling" pain in right elbow over the past few months " "that has recently worsened. Has seen orthopedics as outpatient with unremarkable Radiology.  - EMG/NCV ordered on 12/18 to further evaluate cause of RUE contractures and leg weakness as per Neurology recs but Neurology states can be done as outpatient as low suspicion for motor neuron disease.  - MRI brain done showed "No evidence of acute intracranial pathology"  - US of RUE showed "No thrombus in central veins of the right upper extremity."  - MRI of cervical spine "Mild DJD of C-spine but no stenosis". Repeat MRI of thoracic and cervical spine on 12/19 with contrast with no stenosis or significant disc disease so no structural cause for right arm or leg weakness.   - CPK normal at 61  - Neurology following and appreciate recs and state no further inpatient evaluation needed and EMG/NCV can be done as outpatient and has EMG/NCV ordered for 2/8/2019.   - Pain medication PRN and topical capsaicin to right upper extremity. Pain related to contracture sand improved with placement of right arm splint.   - Edema to right hand and arm related to immobility and improved with splint.   - awaing NH versus home care       Essential hypertension     · Patient's blood pressure controlled.    · Goal for blood pressure is SBP < 140 and DBP < 90 as patient > or = 60 years of age and patient is diabetic based on JNC 8 guidelines.   · Norvasc 10 mg po daily stopped ( new med started on this admit for HTN) on 12/22 as patient with SBP 93. Patient continued on home meds of Lisinopril 40 mg po daily (home med) and Coreg 25 mg po BID (home med) to treat and BP has been stable on this regimen after Norvasc stopped on 12/22 with no further hypotension. Patient to be discharge to NH on this regimen.  · Plan is to monitor patient's blood pressure routinely while patient is hospitalized.       Type 2 diabetes mellitus without complication, without long-term current use of insulin     Controlled in past 24 hours. Holding home meds of " "Sitagliptin and Metformin while patient in hospital and treating with insulin but plan to resume on discharge to NH SNF.  · Last HgA1C 6 % and at goal.  · Plan is to continue Levemir 6 units twice a day and Novolog 4 units with meals to treat in hospital but will discharge on Metformin and Sitagliptin  · SSI and accucheks  · Target pre-meal glucose goal is <140 with all random glucoses <180 in non-critically ill patient.      Vitamin B12 deficiency anemia     - Controlled and stable.   - 11/23: B12 175, Ferritin 90, Transferrin 185, Sat Fe: 13, Fe: 35, TIBC: 274  - Patient started on B12 supplement at Vitamin B12 1000 mcg po daily as outpatient in 11/2018 and repeat Vitamin B12 level on this admit 1062. Patient to continue oral Vitamin B12 supplementation on hospital discharge.   - Folate level normal.       Pure hypercholesterolemia     Controlled. Continue Lipitor 40 mg po daily to treat on discharge.       Localized edema     R hand edema and weakness, pain improved  Unclear exact etiology  MRI brain ana maria howed "No evidence of acute intracranial pathology"  US showed "No thrombus in central veins of the right upper extremity."  Pt was supposed to have outpt EMG done by neurology  PT and OT  Pain medication PRN  Topical capsaicin           DVT PPx: lovenox    Discharge plan and follow up: await retirement NH placement. Medically ready for d/c.    Tessie Mai MD  Hospital Medicine Staff  [phone] pager      "

## 2019-01-09 NOTE — PLAN OF CARE
Problem: Adult Inpatient Plan of Care  Goal: Plan of Care Review  Outcome: Ongoing (interventions implemented as appropriate)  Patient AAOX3 safety and infection precautions taken. Patient's needs met according to care plan. Will cont to monitor.

## 2019-01-09 NOTE — PLAN OF CARE
Problem: Adult Inpatient Plan of Care  Goal: Plan of Care Review  Outcome: Ongoing (interventions implemented as appropriate)  poc reviewed with patient, v/s stable, no questions or concerns at the moment, fall and safety precautions maintained, call light within reach. Incontinence care provided, diaper changed, blue pads changed, applied barrier cream to sacrum, repositioning every 2 hours with wedge and pillows. Will cont to monitor.

## 2019-01-10 NOTE — PLAN OF CARE
Problem: Adult Inpatient Plan of Care  Goal: Plan of Care Review  Outcome: Ongoing (interventions implemented as appropriate)  Pt alert and oriented. Some complaints of pain this shift 7/10 to pt rt arm, pt has norco ordered. Pt vitals have been stable. Blood sugar have been stable. Will continue to monitor, will endorse to oncoming nurse.

## 2019-01-10 NOTE — PLAN OF CARE
Problem: Adult Inpatient Plan of Care  Goal: Plan of Care Review  Outcome: Ongoing (interventions implemented as appropriate)  Patient [id number] and infection precautions taken. Patient's needs addressed per care plan. Will cont to monitor patient.

## 2019-01-10 NOTE — PROGRESS NOTES
Hospital Medicine   Progress Note     [place]: [facility] [place] Tessie Mai MD   Admit Date: 12/14/2018   ANNITA 1/7/2019   Code status: Full Code   Principal Problem: Debility     Interval hx: No new issues, continue to await placement in [facility]. See SW note. She is very eager for placement. Plan set by SW for DC tomorrow V Saturday    ROS    Respiratory: negative for cough, shortness of breath   Cardiovascular: negative for chest discomfort, palpitations   Gastrointestinal: negative for nausea or vomiting, abdominal pain, constipation, diarrhea     PEx   Temp:  [98.6 °F (37 °C)-99.4 °F (37.4 °C)]   Pulse:  [66-75]   Resp:  [16-20]   BP: (122-182)/(68-77)   SpO2:  [97 %-98 %]      I & O (Last 24H):   No intake or output data in the 24 hours ending 01/10/19 1721    General Appearance: thin, elderly lady, supine  Heart: regular rate and rhythm   Respiratory: Normal respiratory effort, no crackles   Abdomen: Soft, non-tender; bowel sounds active   Skin: intact. IV sites ok   Neurologic: No focal numbness or weakness   Mental status: Alert, oriented x 4, affect appropriate, memory intact   Right UE: TTP, swollen with contracture of R fingers and elbow, very limited active movement of R arm or hand, some passive ROM but limited by pain      Recent Labs   Lab 01/09/19  0946 01/09/19  1348 01/09/19  1717 01/09/19  2109 01/10/19  0859 01/10/19  1245   POCTGLUCOSE 142* 59* 108 219* 162* 182*            Active Hospital Problems    Diagnosis  POA    *Debility [R53.81]  Yes    Immobility [Z74.09]  Yes     Priority: 1 - High    Essential hypertension [I10]  Yes     Priority: 2     Type 2 diabetes mellitus without complication, without long-term current use of insulin [E11.9]  Yes     Priority: 3     Vitamin B12 deficiency anemia [D51.9]  Yes     Priority: 4     Pure hypercholesterolemia [E78.00]  Yes     Priority: 5     Contracture of muscle, right upper arm [M62.421]  Yes    Weakness of both lower extremities  "[R29.898]  Yes    Gait disturbance [R26.9]  Yes    Localized edema [R60.0]  Yes      Resolved Hospital Problems   No resolved problems to display.        Overview:  82 YO lady with HTN, HLD, Type 2 diabetes on oral hypoglycemics and osteoarthritis who presented with complaint of persistant right arm weakness and right hand swelling for past 5 months. She denies injury or trauma. She has weakness of the entire RUE. She has difficulty with active shoulder, elbow, wrist, and finger motion. She has flexion contracture of the fingers. She notes edema of the hand which is improved with elevation. She is able to passively extend them to some degree however limited by pain.  She denies notable numbness or tingling. Confusion, trauma, swelling in any other extremities any rashes, fever or chills, neck pain,  hx of known CVA. She was evaluated by Orthopedics in past with  prior x-rays which are unremarkable. So she was sent to Neurology for further evaluation and recommended an EMG. On presentation to ED, she had an MRI brain done that showed " No evidence of acute intracranial pathology.Moderate generalized cerebral volume loss and moderate chronic microvascular ischemic changes" and US of RUE that showed "No thrombus in central veins of the right upper extremity."   Of note pt has been bed bound and not ambulatory for months (no clear inciting events) she has worked with PT and OT in past for her knee pain but has progressively has become bed bound and relies on her family for her ADLs. Family reports that they can no longer meet all her need and are requesting admission for placement to nursing home.      Hospital Course:  Mrs. Cheung was admitted for nursing home placement and Neurology evaluation as to cause of progressive immobility and loss of use of right arm and leg weakness. H/H at baseline with anemia. B12 low, started on supplement. Patient hypertensive in absence of pain and started on Norvasc 10 mg po daily to " "treat. Neurology consulted, MRI of C-spine ordered and returned with only mild DJD of cervical spine but no spinal stenosis and does not explain patient's neurologic symptoms. EMG and NCV ordered on 12/18 to further work-up cause of progressive weakness. PT/OT consulted, recommended SNF placement and  working with family on placement choices. Pain to right arm improved with placement of splint to right hand and forearm to help with contractures. CPK normal at 61. Repeat MRI of thoracic and cervical spine with contrast done on 12/19 as per Neurology recs to make sure no structural cause of neurologic symptoms and repeat MRI unremarkable for any significant disc disease or spinal stenosis to explain patient's neurologic symptoms. Neurology states NCV/EMG can be done as outpatient as low suspicion for motor neuron disease.      PT/OT evaluated the patient.  We tried for SNF placement, but due to the more chronic nature of her disease, [facility]N has denied SNF.  PEER to PEER done without change.  Pt will either need to go home with family and 24 hour care or prison nursing care.  Social work assisting.   Medically ready for discharge, awaiting placement.  [facility] denied SNF as pt is at baseline and not improving with therapy (per peer to peer by Dr Mai). Will need prison nursing home or home care with home health and 24 hour care. Social work working with family. Family unable to take care of her at home, so pursing prison NH placement.     Assessment and Plan for problems addressed today:     Immobility     Gait disturbance  Localized edema of right hand  Contracture of muscle, right upper arm  Weakness of both lower extremities     - Condition unchanged since admit.   - Patient with progressive immobility and weak over past few months starting five months ago, worsening over past two months. Patient with flexion contracture of right hand, along with right hand edema and weakness as well as "tingling" " "pain in right elbow over the past few months that has recently worsened. Has seen orthopedics as outpatient with unremarkable Radiology.  - EMG/NCV ordered on 12/18 to further evaluate cause of RUE contractures and leg weakness as per Neurology recs but Neurology states can be done as outpatient as low suspicion for motor neuron disease.  - MRI brain done showed "No evidence of acute intracranial pathology"  - US of RUE showed "No thrombus in central veins of the right upper extremity."  - MRI of cervical spine "Mild DJD of C-spine but no stenosis". Repeat MRI of thoracic and cervical spine on 12/19 with contrast with no stenosis or significant disc disease so no structural cause for right arm or leg weakness.   - CPK normal at 61  - Neurology following and appreciate recs and state no further inpatient evaluation needed and EMG/NCV can be done as outpatient and has EMG/NCV ordered for 2/8/2019.   - Pain medication PRN and topical capsaicin to right upper extremity. Pain related to contracture sand improved with placement of right arm splint.   - Edema to right hand and arm related to immobility and improved with splint.   - awaing NH versus home care       Essential hypertension     · Patient's blood pressure controlled.    · Goal for blood pressure is SBP < 140 and DBP < 90 as patient > or = 60 years of age and patient is diabetic based on JNC 8 guidelines.   · Norvasc 10 mg po daily stopped ( new med started on this admit for HTN) on 12/22 as patient with SBP 93. Patient continued on home meds of Lisinopril 40 mg po daily (home med) and Coreg 25 mg po BID (home med) to treat and BP has been stable on this regimen after Norvasc stopped on 12/22 with no further hypotension. Patient to be discharge to NH on this regimen.  · Plan is to monitor patient's blood pressure routinely while patient is hospitalized.       Type 2 diabetes mellitus without complication, without long-term current use of insulin     Controlled " "in past 24 hours. Holding home meds of Sitagliptin and Metformin while patient in hospital and treating with insulin but plan to resume on discharge to NH SNF.  · Last HgA1C 6 % and at goal.  · Plan is to continue Levemir 6 units twice a day and Novolog 4 units with meals to treat in hospital but will discharge on Metformin and Sitagliptin  · SSI and accucheks  · Target pre-meal glucose goal is <140 with all random glucoses <180 in non-critically ill patient.      Vitamin B12 deficiency anemia     - Controlled and stable.   - 11/23: B12 175, Ferritin 90, Transferrin 185, Sat Fe: 13, Fe: 35, TIBC: 274  - Patient started on B12 supplement at Vitamin B12 1000 mcg po daily as outpatient in 11/2018 and repeat Vitamin B12 level on this admit 1062. Patient to continue oral Vitamin B12 supplementation on hospital discharge.   - Folate level normal.       Pure hypercholesterolemia     Controlled. Continue Lipitor 40 mg po daily to treat on discharge.       Localized edema     R hand edema and weakness, pain improved  Unclear exact etiology  MRI brain ana maria howed "No evidence of acute intracranial pathology"  US showed "No thrombus in central veins of the right upper extremity."  Pt was supposed to have outpt EMG done by neurology  PT and OT  Pain medication PRN  Topical capsaicin           DVT PPx: lovenox    Discharge plan and follow up: await nursing home NH placement. Medically ready for d/c.    Tessie Mai MD  Hospital Medicine Staff  [phone] pager      "

## 2019-01-11 NOTE — PLAN OF CARE
CASTILLO following for DC needs. CASTILLO in communication with AYESHA.    CASTILLO spoke to Kayli ([phone]) to discuss patient's plan. Kayli stated that she spoke to Zane this morning and they once again told her that insurance will pay for 20 days of SNF. CASTILLO explained to Kayli that the insurance has already denied the patient for SNF and the physician did the peer to peer and they still denied her.   Kayli stated that she did go by [facility] and she liked the facility and they are okay with the patient going there for placement. Kayli stated that she has the paperwork to complete and the family will complete it and bring the appropriate documents to [facility].   Plan is for patient to DC to [facility] early next week.     Maria Del Rosario Umanzor, LMSW [facility]  [id number]

## 2019-01-11 NOTE — PROGRESS NOTES
Hospital Medicine   Progress Note     Team: [facility] MED Z Tessie Mai MD   Admit Date: 12/14/2018   ANNITA 1/12/2019   Code status: Full Code   Principal Problem: Debility     Interval hx: No new issues, continue to await placement in senior living NH. See SW note. She is very eager for placement.     ROS    Respiratory: negative for cough, shortness of breath   Cardiovascular: negative for chest discomfort, palpitations   Gastrointestinal: negative for nausea or vomiting, abdominal pain, constipation, diarrhea     PEx   Temp:  [97.1 °F (36.2 °C)-99.4 °F (37.4 °C)]   Pulse:  [66-74]   Resp:  [18-20]   BP: (120-146)/(58-71)   SpO2:  [96 %-99 %]      I & O (Last 24H):     Intake/Output Summary (Last 24 hours) at 1/11/2019 1620  Last data filed at 1/11/2019 0400  Gross per 24 hour   Intake 720 ml   Output --   Net 720 ml       General Appearance: thin, elderly lady, supine  Heart: regular rate and rhythm   Respiratory: Normal respiratory effort, no crackles   Abdomen: Soft, non-tender; bowel sounds active   Skin: intact. IV sites ok   Neurologic: No focal numbness or weakness   Mental status: Alert, oriented x 4, affect appropriate, memory intact   Right UE: TTP, swollen with contracture of R fingers and elbow, very limited active movement of R arm or hand, some passive ROM but limited by pain      Recent Labs   Lab 01/10/19  0859 01/10/19  1245 01/10/19  1721 01/10/19  2141 01/11/19  0830 01/11/19  1233   POCTGLUCOSE 162* 182* 181* 221* 83 168*            Active Hospital Problems    Diagnosis  POA    *Debility [R53.81]  Yes    Immobility [Z74.09]  Yes     Priority: 1 - High    Essential hypertension [I10]  Yes     Priority: 2     Type 2 diabetes mellitus without complication, without long-term current use of insulin [E11.9]  Yes     Priority: 3     Vitamin B12 deficiency anemia [D51.9]  Yes     Priority: 4     Pure hypercholesterolemia [E78.00]  Yes     Priority: 5     Contracture of muscle, right upper arm  "[M62.421]  Yes    Weakness of both lower extremities [R29.898]  Yes    Gait disturbance [R26.9]  Yes    Localized edema [R60.0]  Yes      Resolved Hospital Problems   No resolved problems to display.        Overview:  82 YO lady with HTN, HLD, Type 2 diabetes on oral hypoglycemics and osteoarthritis who presented with complaint of persistant right arm weakness and right hand swelling for past 5 months. She denies injury or trauma. She has weakness of the entire RUE. She has difficulty with active shoulder, elbow, wrist, and finger motion. She has flexion contracture of the fingers. She notes edema of the hand which is improved with elevation. She is able to passively extend them to some degree however limited by pain.  She denies notable numbness or tingling. Confusion, trauma, swelling in any other extremities any rashes, fever or chills, neck pain,  hx of known CVA. She was evaluated by Orthopedics in past with  prior x-rays which are unremarkable. So she was sent to Neurology for further evaluation and recommended an EMG. On presentation to ED, she had an MRI brain done that showed " No evidence of acute intracranial pathology.Moderate generalized cerebral volume loss and moderate chronic microvascular ischemic changes" and US of RUE that showed "No thrombus in central veins of the right upper extremity."   Of note pt has been bed bound and not ambulatory for months (no clear inciting events) she has worked with PT and OT in past for her knee pain but has progressively has become bed bound and relies on her family for her ADLs. Family reports that they can no longer meet all her need and are requesting admission for placement to nursing home.      Hospital Course:  Mrs. Cheung was admitted for nursing home placement and Neurology evaluation as to cause of progressive immobility and loss of use of right arm and leg weakness. H/H at baseline with anemia. B12 low, started on supplement. Patient hypertensive in " absence of pain and started on Norvasc 10 mg po daily to treat. Neurology consulted, MRI of C-spine ordered and returned with only mild DJD of cervical spine but no spinal stenosis and does not explain patient's neurologic symptoms. EMG and NCV ordered on 12/18 to further work-up cause of progressive weakness. PT/OT consulted, recommended SNF placement and  working with family on placement choices. Pain to right arm improved with placement of splint to right hand and forearm to help with contractures. CPK normal at 61. Repeat MRI of thoracic and cervical spine with contrast done on 12/19 as per Neurology recs to make sure no structural cause of neurologic symptoms and repeat MRI unremarkable for any significant disc disease or spinal stenosis to explain patient's neurologic symptoms. Neurology states NCV/EMG can be done as outpatient as low suspicion for motor neuron disease.      PT/OT evaluated the patient.  We tried for SNF placement, but due to the more chronic nature of her disease, [facility]N has denied SNF.  PEER to PEER done without change.  Pt will either need to go home with family and 24 hour care or prison nursing care.  Social work assisting.   Medically ready for discharge, awaiting placement.  [facility] denied SNF as pt is at baseline and not improving with therapy (per peer to peer by Dr Mai). Will need prison nursing home or home care with home health and 24 hour care. Social work working with family. Family unable to take care of her at home, so pursing prison NH placement.     Assessment and Plan for problems addressed today:     Immobility     Gait disturbance  Localized edema of right hand  Contracture of muscle, right upper arm  Weakness of both lower extremities     - Condition unchanged since admit.   - Patient with progressive immobility and weak over past few months starting five months ago, worsening over past two months. Patient with flexion contracture of right hand, along  "with right hand edema and weakness as well as "tingling" pain in right elbow over the past few months that has recently worsened. Has seen orthopedics as outpatient with unremarkable Radiology.  - EMG/NCV ordered on 12/18 to further evaluate cause of RUE contractures and leg weakness as per Neurology recs but Neurology states can be done as outpatient as low suspicion for motor neuron disease.  - MRI brain done showed "No evidence of acute intracranial pathology"  - US of RUE showed "No thrombus in central veins of the right upper extremity."  - MRI of cervical spine "Mild DJD of C-spine but no stenosis". Repeat MRI of thoracic and cervical spine on 12/19 with contrast with no stenosis or significant disc disease so no structural cause for right arm or leg weakness.   - CPK normal at 61  - Neurology following and appreciate recs and state no further inpatient evaluation needed and EMG/NCV can be done as outpatient and has EMG/NCV ordered for 2/8/2019.   - Pain medication PRN and topical capsaicin to right upper extremity. Pain related to contracture sand improved with placement of right arm splint.   - Edema to right hand and arm related to immobility and improved with splint.   - awaing NH versus home care       Essential hypertension     · Patient's blood pressure controlled.    · Goal for blood pressure is SBP < 140 and DBP < 90 as patient > or = 60 years of age and patient is diabetic based on JNC 8 guidelines.   · Norvasc 10 mg po daily stopped ( new med started on this admit for HTN) on 12/22 as patient with SBP 93. Patient continued on home meds of Lisinopril 40 mg po daily (home med) and Coreg 25 mg po BID (home med) to treat and BP has been stable on this regimen after Norvasc stopped on 12/22 with no further hypotension. Patient to be discharge to NH on this regimen.  · Plan is to monitor patient's blood pressure routinely while patient is hospitalized.       Type 2 diabetes mellitus without complication, " "without long-term current use of insulin     Controlled in past 24 hours. Holding home meds of Sitagliptin and Metformin while patient in hospital and treating with insulin but plan to resume on discharge to NH SNF.  · Last HgA1C 6 % and at goal.  · Plan is to continue Levemir 6 units twice a day and Novolog 4 units with meals to treat in hospital but will discharge on Metformin and Sitagliptin  · SSI and accucheks  · Target pre-meal glucose goal is <140 with all random glucoses <180 in non-critically ill patient.      Vitamin B12 deficiency anemia     - Controlled and stable.   - 11/23: B12 175, Ferritin 90, Transferrin 185, Sat Fe: 13, Fe: 35, TIBC: 274  - Patient started on B12 supplement at Vitamin B12 1000 mcg po daily as outpatient in 11/2018 and repeat Vitamin B12 level on this admit 1062. Patient to continue oral Vitamin B12 supplementation on hospital discharge.   - Folate level normal.       Pure hypercholesterolemia     Controlled. Continue Lipitor 40 mg po daily to treat on discharge.       Localized edema     R hand edema and weakness, pain improved  Unclear exact etiology  MRI brain ana maria howed "No evidence of acute intracranial pathology"  US showed "No thrombus in central veins of the right upper extremity."  Pt was supposed to have outpt EMG done by neurology  PT and OT  Pain medication PRN  Topical capsaicin           DVT PPx: lovenox    Discharge plan and follow up: await intermediate NH placement. Medically ready for d/c.    Tessie Mai MD  Hospital Medicine Staff  [phone] pager      "

## 2019-01-11 NOTE — PLAN OF CARE
Problem: Adult Inpatient Plan of Care  Goal: Plan of Care Review  Outcome: Ongoing (interventions implemented as appropriate)  pt turned q2h on wedge, barrier cream applied during each diaper change, foam dressing applied to sacral area, no new skin breakdown noted. pt AAOx3, disoriented to time, VSS, no acute distress noted. pt instructed to call for assistance if needed, call light in reach. will continue to monitor.

## 2019-01-11 NOTE — PLAN OF CARE
Problem: Adult Inpatient Plan of Care  Goal: Plan of Care Review  Assessment and Plan  Nutrition Problem  Inadequate oral intake    Related to (etiology):   Lack of appetite    Signs and Symptoms (as evidenced by):   Pt with 11lb wt loss x 6 weeks     Interventions/Recommendations (treatment strategy):  Recommend ONS TID.      Recommendations     Recommendation/Intervention: 1. Continue Diabetic diet. 2. Provide Boost Glucose Control TID.  Goals: 1. Pt to consume % EEN and EPN.

## 2019-01-11 NOTE — PROGRESS NOTES
"[facility]  Adult Nutrition  Progress Note    SUMMARY       Recommendations    Recommendation/Intervention: 1. Continue Diabetic diet. 2. Provide Boost Glucose Control TID.  Goals: 1. Pt to consume % EEN and EPN.  Nutrition Goal Status: new  Communication of RD Recs: (POC)    Reason for Assessment    Reason For Assessment: length of stay  Diagnosis: (none)  Relevant Medical History: HTN, HLD, DM2, right knee osteoarthritis  General Information Comments: Pt admitted for NH placement as she has been bed bound for months and family can no longer care for her. Awaiting placement. Pt's po intake varies but appears to be fair. Per chart, pt has 11 lb (8.9%) wt loss x 6 weeks. Pt reports she has not lost that much weight. Completed NFPE: Pt has mild temporal wasting, but unable to ascertain if mild overall muscle and fat wasting is due to age-related loss or bed bound status.  Nutrition Discharge Planning: Pt with adequate po intake to meet nutrition needs.    Nutrition Risk Screen    Nutrition Risk Screen: no indicators present    Nutrition/Diet History    Spiritual, Cultural Beliefs, Scientologist Practices, Values that Affect Care: no    Anthropometrics    Temp: 99.4 °F (37.4 °C)  Height Method: Stated  Height: 5' 2" (157.5 cm)  Height (inches): 62 in  Weight Method: Bed Scale  Weight: 50.9 kg (112 lb 3.4 oz)  Weight (lb): 112.22 lb  Ideal Body Weight (IBW), Female: 110 lb  % Ideal Body Weight, Female (lb): 110 lb  BMI (Calculated): 22.2       Lab/Procedures/Meds    Pertinent Labs Reviewed: reviewed  Pertinent Labs Comments: Glu 168, A1c 6% (11/23/18)  Pertinent Medications Reviewed: reviewed  Pertinent Medications Comments: vitamin B12, statin, aspart/detemir, magnesium oxide    Estimated/Assessed Needs    Weight Used For Calorie Calculations: 50.9 kg (112 lb 3.4 oz)  Energy Calorie Requirements (kcal): 1108 kcal  Energy Need Method: Larue-St Dc(PAL 1.20)  Protein Requirements: 51-61g  Weight " Used For Protein Calculations: 50.9 kg (112 lb 3.4 oz)        RDA Method (mL): 1108  CHO Requirement: 50% total kcal      Nutrition Prescription Ordered    Current Diet Order: Diabetic    Evaluation of Received Nutrient/Fluid Intake    I/O: +2.2L since admission  Comments: LBM 1/11  % Intake of Estimated Energy Needs: 50 - 75 %  % Meal Intake: 50 - 75 %    Nutrition Risk      low    Assessment and Plan  Nutrition Problem  Inadequate oral intake    Related to (etiology):   Lack of appetite    Signs and Symptoms (as evidenced by):   Pt with 11lb wt loss x 6 weeks     Interventions/Recommendations (treatment strategy):  Recommend ONS TID.    Nutrition Diagnosis Status:   New      Monitor and Evaluation    Food and Nutrient Intake: energy intake, food and beverage intake  Food and Nutrient Adminstration: diet order  Anthropometric Measurements: weight, weight change, body mass index  Biochemical Data, Medical Tests and Procedures: electrolyte and renal panel, gastrointestinal profile, glucose/endocrine profile, inflammatory profile, lipid profile  Nutrition-Focused Physical Findings: overall appearance     Malnutrition Assessment  Malnutrition Type: chronic illness  Energy Intake: moderate energy intake          Weight Loss (Malnutrition): (9% x 6 weeks)  Energy Intake (Malnutrition): less than or equal to 75% for greater than or equal to 1 month  Subcutaneous Fat (Malnutrition): mild depletion  Muscle Mass (Malnutrition): mild depletion   Orbital Region (Subcutaneous Fat Loss): mild depletion   [place] Region (Muscle Loss): mild depletion            Severe Weight Loss (Malnutrition): greater than 7.5% in 3 months    Nutrition Follow-Up    RD Follow-up?: Yes

## 2019-01-13 NOTE — PLAN OF CARE
Problem: Adult Inpatient Plan of Care  Goal: Plan of Care Review  Outcome: Ongoing (interventions implemented as appropriate)  Patient AAOX4 safety and infection precautions taken. Patient's needs addressed per care plan.  Will cont to monitor.

## 2019-01-13 NOTE — PROGRESS NOTES
Hospital Medicine   Progress Note     Team: [facility] HOSP MED Z Tessie Mai MD   Admit Date: 12/14/2018   ANNITA 1/12/2019   Code status: Full Code   Principal Problem: Debility     Interval hx: No new issues, continue to await placement in [facility]. See SW note. She is very eager for placement.     ROS    Respiratory: negative for cough, shortness of breath   Cardiovascular: negative for chest discomfort, palpitations   Gastrointestinal: negative for nausea or vomiting, abdominal pain, constipation, diarrhea     PEx   Temp:  [96.6 °F (35.9 °C)-98.5 °F (36.9 °C)]   Pulse:  [55-78]   Resp:  [14-18]   BP: (119-165)/(62-82)   SpO2:  [96 %-100 %]      I & O (Last 24H):     Intake/Output Summary (Last 24 hours) at 1/13/2019 1125  Last data filed at 1/13/2019 0600  Gross per 24 hour   Intake 160 ml   Output --   Net 160 ml       General Appearance: thin, elderly lady, supine  Heart: regular rate and rhythm   Respiratory: Normal respiratory effort, no crackles   Abdomen: Soft, non-tender; bowel sounds active   Skin: intact. IV sites ok   Neurologic: No focal numbness or weakness   Mental status: Alert, oriented x 4, affect appropriate, memory intact   Right UE: TTP, swollen with contracture of R fingers and elbow, very limited active movement of R arm or hand, some passive ROM but limited by pain      Recent Labs   Lab 01/11/19  1713 01/11/19  2143 01/12/19  0825 01/12/19  1249 01/12/19  1844 01/13/19  0818   POCTGLUCOSE 76 138* 85 158* 144* 128*            Active Hospital Problems    Diagnosis  POA    *Debility [R53.81]  Yes    Immobility [Z74.09]  Yes     Priority: 1 - High    Essential hypertension [I10]  Yes     Priority: 2     Type 2 diabetes mellitus without complication, without long-term current use of insulin [E11.9]  Yes     Priority: 3     Vitamin B12 deficiency anemia [D51.9]  Yes     Priority: 4     Pure hypercholesterolemia [E78.00]  Yes     Priority: 5     Contracture of muscle, right upper arm  "[M62.421]  Yes    Weakness of both lower extremities [R29.898]  Yes    Gait disturbance [R26.9]  Yes    Localized edema [R60.0]  Yes      Resolved Hospital Problems   No resolved problems to display.        Overview:  84 YO lady with HTN, HLD, Type 2 diabetes on oral hypoglycemics and osteoarthritis who presented with complaint of persistant right arm weakness and right hand swelling for past 5 months. She denies injury or trauma. She has weakness of the entire RUE. She has difficulty with active shoulder, elbow, wrist, and finger motion. She has flexion contracture of the fingers. She notes edema of the hand which is improved with elevation. She is able to passively extend them to some degree however limited by pain.  She denies notable numbness or tingling. Confusion, trauma, swelling in any other extremities any rashes, fever or chills, neck pain,  hx of known CVA. She was evaluated by Orthopedics in past with  prior x-rays which are unremarkable. So she was sent to Neurology for further evaluation and recommended an EMG. On presentation to ED, she had an MRI brain done that showed " No evidence of acute intracranial pathology.Moderate generalized cerebral volume loss and moderate chronic microvascular ischemic changes" and US of RUE that showed "No thrombus in central veins of the right upper extremity."   Of note pt has been bed bound and not ambulatory for months (no clear inciting events) she has worked with PT and OT in past for her knee pain but has progressively has become bed bound and relies on her family for her ADLs. Family reports that they can no longer meet all her need and are requesting admission for placement to nursing home.      Hospital Course:  Mrs. Cheung was admitted for nursing home placement and Neurology evaluation as to cause of progressive immobility and loss of use of right arm and leg weakness. H/H at baseline with anemia. B12 low, started on supplement. Patient hypertensive in " absence of pain and started on Norvasc 10 mg po daily to treat. Neurology consulted, MRI of C-spine ordered and returned with only mild DJD of cervical spine but no spinal stenosis and does not explain patient's neurologic symptoms. EMG and NCV ordered on 12/18 to further work-up cause of progressive weakness. PT/OT consulted, recommended SNF placement and  working with family on placement choices. Pain to right arm improved with placement of splint to right hand and forearm to help with contractures. CPK normal at 61. Repeat MRI of thoracic and cervical spine with contrast done on 12/19 as per Neurology recs to make sure no structural cause of neurologic symptoms and repeat MRI unremarkable for any significant disc disease or spinal stenosis to explain patient's neurologic symptoms. Neurology states NCV/EMG can be done as outpatient as low suspicion for motor neuron disease.      PT/OT evaluated the patient.  We tried for SNF placement, but due to the more chronic nature of her disease, [facility] has denied SNF.  PEER to PEER done without change.  Pt will either need to go home with family and 24 hour care or nursing home nursing care.  Social work assisting.   Medically ready for discharge, awaiting placement.  [facility] denied SNF as pt is at baseline and not improving with therapy (per peer to peer by Dr Mai). Will need nursing home nursing home or home care with home health and 24 hour care. Social work working with family. Family unable to take care of her at home, so pursing nursing home NH placement.     Assessment and Plan for problems addressed today:     Immobility     Gait disturbance  Localized edema of right hand  Contracture of muscle, right upper arm  Weakness of both lower extremities     - Condition unchanged since admit.   - Patient with progressive immobility and weak over past few months starting five months ago, worsening over past two months. Patient with flexion contracture of right hand, along  "with right hand edema and weakness as well as "tingling" pain in right elbow over the past few months that has recently worsened. Has seen orthopedics as outpatient with unremarkable Radiology.  - EMG/NCV ordered on 12/18 to further evaluate cause of RUE contractures and leg weakness as per Neurology recs but Neurology states can be done as outpatient as low suspicion for motor neuron disease.  - MRI brain done showed "No evidence of acute intracranial pathology"  - US of RUE showed "No thrombus in central veins of the right upper extremity."  - MRI of cervical spine "Mild DJD of C-spine but no stenosis". Repeat MRI of thoracic and cervical spine on 12/19 with contrast with no stenosis or significant disc disease so no structural cause for right arm or leg weakness.   - CPK normal at 61  - Neurology following and appreciate recs and state no further inpatient evaluation needed and EMG/NCV can be done as outpatient and has EMG/NCV ordered for 2/8/2019.   - Pain medication PRN and topical capsaicin to right upper extremity. Pain related to contracture sand improved with placement of right arm splint.   - Edema to right hand and arm related to immobility and improved with splint.   - awaing NH versus home care       Essential hypertension     · Patient's blood pressure controlled.    · Goal for blood pressure is SBP < 140 and DBP < 90 as patient > or = 60 years of age and patient is diabetic based on JNC 8 guidelines.   · Norvasc 10 mg po daily stopped ( new med started on this admit for HTN) on 12/22 as patient with SBP 93. Patient continued on home meds of Lisinopril 40 mg po daily (home med) and Coreg 25 mg po BID (home med) to treat and BP has been stable on this regimen after Norvasc stopped on 12/22 with no further hypotension. Patient to be discharge to NH on this regimen.  · Plan is to monitor patient's blood pressure routinely while patient is hospitalized.       Type 2 diabetes mellitus without complication, " "without long-term current use of insulin     Controlled in past 24 hours. Holding home meds of Sitagliptin and Metformin while patient in hospital and treating with insulin but plan to resume on discharge to NH SNF.  · Last HgA1C 6 % and at goal.  · Plan is to continue Levemir 6 units twice a day and Novolog 4 units with meals to treat in hospital but will discharge on Metformin and Sitagliptin  · SSI and accucheks  · Target pre-meal glucose goal is <140 with all random glucoses <180 in non-critically ill patient.      Vitamin B12 deficiency anemia     - Controlled and stable.   - 11/23: B12 175, Ferritin 90, Transferrin 185, Sat Fe: 13, Fe: 35, TIBC: 274  - Patient started on B12 supplement at Vitamin B12 1000 mcg po daily as outpatient in 11/2018 and repeat Vitamin B12 level on this admit 1062. Patient to continue oral Vitamin B12 supplementation on hospital discharge.   - Folate level normal.       Pure hypercholesterolemia     Controlled. Continue Lipitor 40 mg po daily to treat on discharge.       Localized edema     R hand edema and weakness, pain improved  Unclear exact etiology  MRI brain ana maria howed "No evidence of acute intracranial pathology"  US showed "No thrombus in central veins of the right upper extremity."  Pt was supposed to have outpt EMG done by neurology  PT and OT  Pain medication PRN  Topical capsaicin           DVT PPx: lovenox    Discharge plan and follow up: await shelter NH placement. Medically ready for d/c.    Tessie Mai MD  Hospital Medicine Staff  [phone] pager      "

## 2019-01-13 NOTE — PLAN OF CARE
Problem: Adult Inpatient Plan of Care  Goal: Plan of Care Review  Outcome: Ongoing (interventions implemented as appropriate)  PT medicated per MAR for pain. Call light in reach. No falls this shift. Levemir given per MAr

## 2019-01-14 NOTE — PROGRESS NOTES
Hospital Medicine   Progress Note     Team: [facility] HOSP MED Z Tessie Mai MD   Admit Date: 12/14/2018   ANNITA 1/14/2019   Code status: Full Code   Principal Problem: Debility     Interval hx: No new issues, continue to await placement in [facility]. See SW note. She is very eager for placement.     ROS    Respiratory: negative for cough, shortness of breath   Cardiovascular: negative for chest discomfort, palpitations   Gastrointestinal: negative for nausea or vomiting, abdominal pain, constipation, diarrhea     PEx   Temp:  [98.1 °F (36.7 °C)-98.6 °F (37 °C)]   Pulse:  [59-67]   Resp:  [15-18]   BP: (115-177)/(63-84)   SpO2:  [94 %-99 %]      I & O (Last 24H):   No intake or output data in the 24 hours ending 01/14/19 1429    General Appearance: thin, elderly lady, supine  Heart: regular rate and rhythm   Respiratory: Normal respiratory effort, no crackles   Abdomen: Soft, non-tender; bowel sounds active   Skin: intact. IV sites ok   Neurologic: No focal numbness or weakness   Mental status: Alert, oriented x 4, affect appropriate, memory intact   Right UE: TTP, swollen with contracture of R fingers and elbow, very limited active movement of R arm or hand, some passive ROM but limited by pain      Recent Labs   Lab 01/13/19  0818 01/13/19  1427 01/13/19  1709 01/13/19  2046 01/14/19  0930 01/14/19  1254   POCTGLUCOSE 128* 177* 137* 174* 97 138*            Active Hospital Problems    Diagnosis  POA    *Debility [R53.81]  Yes    Immobility [Z74.09]  Yes     Priority: 1 - High    Essential hypertension [I10]  Yes     Priority: 2     Type 2 diabetes mellitus without complication, without long-term current use of insulin [E11.9]  Yes     Priority: 3     Vitamin B12 deficiency anemia [D51.9]  Yes     Priority: 4     Pure hypercholesterolemia [E78.00]  Yes     Priority: 5     Contracture of muscle, right upper arm [M62.421]  Yes    Weakness of both lower extremities [R29.898]  Yes    Gait disturbance [R26.9]   "Yes    Localized edema [R60.0]  Yes      Resolved Hospital Problems   No resolved problems to display.        Overview:  84 YO lady with HTN, HLD, Type 2 diabetes on oral hypoglycemics and osteoarthritis who presented with complaint of persistant right arm weakness and right hand swelling for past 5 months. She denies injury or trauma. She has weakness of the entire RUE. She has difficulty with active shoulder, elbow, wrist, and finger motion. She has flexion contracture of the fingers. She notes edema of the hand which is improved with elevation. She is able to passively extend them to some degree however limited by pain.  She denies notable numbness or tingling. Confusion, trauma, swelling in any other extremities any rashes, fever or chills, neck pain,  hx of known CVA. She was evaluated by Orthopedics in past with  prior x-rays which are unremarkable. So she was sent to Neurology for further evaluation and recommended an EMG. On presentation to ED, she had an MRI brain done that showed " No evidence of acute intracranial pathology.Moderate generalized cerebral volume loss and moderate chronic microvascular ischemic changes" and US of RUE that showed "No thrombus in central veins of the right upper extremity."   Of note pt has been bed bound and not ambulatory for months (no clear inciting events) she has worked with PT and OT in past for her knee pain but has progressively has become bed bound and relies on her family for her ADLs. Family reports that they can no longer meet all her need and are requesting admission for placement to nursing home.      Hospital Course:  Mrs. Cheung was admitted for nursing home placement and Neurology evaluation as to cause of progressive immobility and loss of use of right arm and leg weakness. H/H at baseline with anemia. B12 low, started on supplement. Patient hypertensive in absence of pain and started on Norvasc 10 mg po daily to treat. Neurology consulted, MRI of C-spine " "ordered and returned with only mild DJD of cervical spine but no spinal stenosis and does not explain patient's neurologic symptoms. EMG and NCV ordered on 12/18 to further work-up cause of progressive weakness. PT/OT consulted, recommended SNF placement and  working with family on placement choices. Pain to right arm improved with placement of splint to right hand and forearm to help with contractures. CPK normal at 61. Repeat MRI of thoracic and cervical spine with contrast done on 12/19 as per Neurology recs to make sure no structural cause of neurologic symptoms and repeat MRI unremarkable for any significant disc disease or spinal stenosis to explain patient's neurologic symptoms. Neurology states NCV/EMG can be done as outpatient as low suspicion for motor neuron disease.      PT/OT evaluated the patient.  We tried for SNF placement, but due to the more chronic nature of her disease, [facility] has denied SNF.  PEER to PEER done without change.  Pt will either need to go home with family and 24 hour care or care home nursing care.  Social work assisting.   Medically ready for discharge, awaiting placement.  [facility] denied SNF as pt is at baseline and not improving with therapy (per peer to peer by Dr Mai). Will need care home nursing home or home care with home health and 24 hour care. Social work working with family. Family unable to take care of her at home, so pursing care home NH placement.     Assessment and Plan for problems addressed today:     Immobility     Gait disturbance  Localized edema of right hand  Contracture of muscle, right upper arm  Weakness of both lower extremities     - Condition unchanged since admit.   - Patient with progressive immobility and weak over past few months starting five months ago, worsening over past two months. Patient with flexion contracture of right hand, along with right hand edema and weakness as well as "tingling" pain in right elbow over the past few months " "that has recently worsened. Has seen orthopedics as outpatient with unremarkable Radiology.  - EMG/NCV ordered on 12/18 to further evaluate cause of RUE contractures and leg weakness as per Neurology recs but Neurology states can be done as outpatient as low suspicion for motor neuron disease.  - MRI brain done showed "No evidence of acute intracranial pathology"  - US of RUE showed "No thrombus in central veins of the right upper extremity."  - MRI of cervical spine "Mild DJD of C-spine but no stenosis". Repeat MRI of thoracic and cervical spine on 12/19 with contrast with no stenosis or significant disc disease so no structural cause for right arm or leg weakness.   - CPK normal at 61  - Neurology following and appreciate recs and state no further inpatient evaluation needed and EMG/NCV can be done as outpatient and has EMG/NCV ordered for 2/8/2019.   - Pain medication PRN and topical capsaicin to right upper extremity. Pain related to contracture sand improved with placement of right arm splint.   - Edema to right hand and arm related to immobility and improved with splint.   - awaing NH versus home care       Essential hypertension     · Patient's blood pressure controlled.    · Goal for blood pressure is SBP < 140 and DBP < 90 as patient > or = 60 years of age and patient is diabetic based on JNC 8 guidelines.   · Norvasc 10 mg po daily stopped ( new med started on this admit for HTN) on 12/22 as patient with SBP 93. Patient continued on home meds of Lisinopril 40 mg po daily (home med) and Coreg 25 mg po BID (home med) to treat and BP has been stable on this regimen after Norvasc stopped on 12/22 with no further hypotension. Patient to be discharge to NH on this regimen.  · Plan is to monitor patient's blood pressure routinely while patient is hospitalized.       Type 2 diabetes mellitus without complication, without long-term current use of insulin     Controlled in past 24 hours. Holding home meds of " "Sitagliptin and Metformin while patient in hospital and treating with insulin but plan to resume on discharge to NH SNF.  · Last HgA1C 6 % and at goal.  · Plan is to continue Levemir 6 units twice a day and Novolog 4 units with meals to treat in hospital but will discharge on Metformin and Sitagliptin  · SSI and accucheks  · Target pre-meal glucose goal is <140 with all random glucoses <180 in non-critically ill patient.      Vitamin B12 deficiency anemia     - Controlled and stable.   - 11/23: B12 175, Ferritin 90, Transferrin 185, Sat Fe: 13, Fe: 35, TIBC: 274  - Patient started on B12 supplement at Vitamin B12 1000 mcg po daily as outpatient in 11/2018 and repeat Vitamin B12 level on this admit 1062. Patient to continue oral Vitamin B12 supplementation on hospital discharge.   - Folate level normal.       Pure hypercholesterolemia     Controlled. Continue Lipitor 40 mg po daily to treat on discharge.       Localized edema     R hand edema and weakness, pain improved  Unclear exact etiology  MRI brain ana maria howed "No evidence of acute intracranial pathology"  US showed "No thrombus in central veins of the right upper extremity."  Pt was supposed to have outpt EMG done by neurology  PT and OT  Pain medication PRN  Topical capsaicin           DVT PPx: lovenox    Discharge plan and follow up: await long-term NH placement. Medically ready for d/c.    Tessie Mai MD  Hospital Medicine Staff  [phone] pager      "

## 2019-01-14 NOTE — PLAN OF CARE
CASTILLO following for DC needs. CASTILLO in communication with AYESHA.    CASTILLO received call from Rachael at [facility] stating that the family brought in the appropriate paperwork and is going to sign admissions paperwork at 1:30PM today. After the family signs the paperwork the patient can admit.     CASTILLO notified Dr. Mai who wrote orders. CASTILLO sent orders via [employer] to [facility].      01/14/19 1149   Post-Acute Status   Post-Acute Authorization Placement   Post-Acute Placement Status Authorization Obtained       Maria Del Rosario Umanzor LMSW  [facility]  [id number]

## 2019-01-14 NOTE — PLAN OF CARE
CASTILLO received call from Rachael at [facility] who stated that the family did not show up for their 1:30PM appointment to sign paperwork. Rachael at [facility] stated that Audrey was supposed to sign the paperwork.    CASTILLO called patient's daughter, Audrey ([phone]). Audrey stated that they went to [facility] today and were told that they can accept the patient. Audrey stated that they are on their way now to [facility] to sign paperwork.     CASTILLO spoke to Ms. Barnhart at [facility] ([phone]). Ms. Barnhart stated that they can accept the patient but cannot accept her until tomorrow because 2:00 is their cut off time. Plan is for patient to [place] Tuesday morning before 12:00PM. CASTILLO notified Dr. Mai.     CASTILLO attempted to call patient's DIL, Kayli. SW left message.      01/14/19 1430   Post-Acute Status   Post-Acute Authorization Placement   Post-Acute Placement Status Family Barriers     UPDATE 2:42 PM  CASTILLO spoke to patient's sister, Audrey. Audrey stated that they just left [facility] and completed the paperwork. CASTILLO informed Audrey that CASTILLO will arrange transport for the patient for 10AM on Tuesday.     Maria Del Rosario Umanzor, WILSON  [facility]  [id number]

## 2019-01-14 NOTE — PLAN OF CARE
Problem: Adult Inpatient Plan of Care  Goal: Plan of Care Review  Outcome: Ongoing (interventions implemented as appropriate)  Patient's AAOX4 safety and infection precautions taken. Will cont to monitor.

## 2019-01-14 NOTE — PLAN OF CARE
Copied from Flowsheets for Nursing Home.      01/13/19 0800 01/13/19 2029   Stool Assessment   Last Bowel Movement 01/12/19 01/14/19     Maria Del Rosario Umanzor LMSW  [facility]  [id number]

## 2019-01-14 NOTE — DISCHARGE SUMMARY
"Discharge Summary  Hospital Medicine    Patient Name: Deisi Cheung  MRN:  [id number]   Provider on Discharge: Tessie Mai MD  Hospital Medicine Team: [facility]  Date of Admission:  12/14/2018     Date of Discharge:  1/15/2019  Code Status: Full Code    HPI / Hospital Course: 84 YO lady with HTN, HLD, Type 2 diabetes on oral hypoglycemics and osteoarthritis who presented with complaint of persistant right arm weakness and right hand swelling for past 5 months. She denies injury or trauma. She has weakness of the entire RUE. She has difficulty with active shoulder, elbow, wrist, and finger motion. She has flexion contracture of the fingers. She notes edema of the hand which is improved with elevation. She is able to passively extend them to some degree however limited by pain.  She denies notable numbness or tingling. Confusion, trauma, swelling in any other extremities any rashes, fever or chills, neck pain,  hx of known CVA. She was evaluated by Orthopedics in past with  prior x-rays which are unremarkable. So she was sent to Neurology for further evaluation and recommended an EMG. On presentation to ED, she had an MRI brain done that showed " No evidence of acute intracranial pathology.Moderate generalized cerebral volume loss and moderate chronic microvascular ischemic changes" and US of RUE that showed "No thrombus in central veins of the right upper extremity."   Of note pt has been bed bound and not ambulatory for months (no clear inciting events) she has worked with PT and OT in past for her knee pain but has progressively has become bed bound and relies on her family for her ADLs. Family reports that they can no longer meet all her need and are requesting admission for placement to nursing home. Mrs. Cheung was admitted for nursing home placement and Neurology evaluation as to cause of progressive immobility and loss of use of right arm and leg weakness. H/H at baseline with anemia. B12 " low, started on supplement. Patient hypertensive in absence of pain and started on Norvasc 10 mg po daily to treat. Neurology consulted, MRI of C-spine ordered and returned with only mild DJD of cervical spine but no spinal stenosis and does not explain patient's neurologic symptoms. EMG and NCV ordered on 12/18 to further work-up cause of progressive weakness. PT/OT consulted, recommended SNF placement and  working with family on placement choices. Pain to right arm improved with placement of splint to right hand and forearm to help with contractures. CPK normal at 61. Repeat MRI of thoracic and cervical spine with contrast done on 12/19 as per Neurology recs to make sure no structural cause of neurologic symptoms and repeat MRI unremarkable for any significant disc disease or spinal stenosis to explain patient's neurologic symptoms. Neurology states NCV/EMG can be done as outpatient as low suspicion for motor neuron disease.      PT/OT evaluated the patient.  We tried for SNF placement, but due to the more chronic nature of her disease, PHN has denied SNF.  PEER to PEER done without change.  Now going to [facility] for long term placement.          Active Hospital Problems    Diagnosis  POA    *Debility [R53.81]  Yes    Immobility [Z74.09]  Yes     Priority: 1 - High    Essential hypertension [I10]  Yes     Priority: 2     Type 2 diabetes mellitus without complication, without long-term current use of insulin [E11.9]  Yes     Priority: 3     Vitamin B12 deficiency anemia [D51.9]  Yes     Priority: 4     Pure hypercholesterolemia [E78.00]  Yes     Priority: 5     Contracture of muscle, right upper arm [M62.421]  Yes    Weakness of both lower extremities [R29.898]  Yes    Gait disturbance [R26.9]  Yes    Localized edema [R60.0]  Yes      Resolved Hospital Problems   No resolved problems to display.       Current Discharge Medication List      START taking these medications     Details   acetaminophen (TYLENOL) 325 MG tablet Take 2 tablets (650 mg total) by mouth every 4 (four) hours as needed (Mild 1-3/10 pain or temperature > 101 F).  Refills: 0      cyanocobalamin (VITAMIN B-12) 1000 MCG tablet Take 1 tablet (1,000 mcg total) by mouth once daily.      HYDROcodone-acetaminophen (NORCO)  mg per tablet Take 1 tablet by mouth every 4 (four) hours as needed.  Qty: 20 tablet, Refills: 0      polyethylene glycol (GLYCOLAX) 17 gram PwPk Take 17 g by mouth once daily.  Refills: 0         CONTINUE these medications which have CHANGED    Details   SITagliptin (JANUVIA) 50 MG Tab Take 1 tablet (50 mg total) by mouth once daily.         CONTINUE these medications which have NOT CHANGED    Details   alendronate (FOSAMAX) 70 MG tablet Take 1 tablet (70 mg total) by mouth every 7 days.  Qty: 4 tablet, Refills: 11      aspirin (ECOTRIN) 81 MG EC tablet Take 81 mg by mouth once daily.      atorvastatin (LIPITOR) 40 MG tablet Take 40 mg by mouth once daily.      carvedilol (COREG) 25 MG tablet Take 1 tablet (25 mg total) by mouth 2 (two) times daily with meals.  Qty: 180 tablet, Refills: 3      lisinopril (PRINIVIL,ZESTRIL) 40 MG tablet Take 40 mg by mouth once daily.      magnesium oxide (MAG-OX) 400 mg (241.3 mg magnesium) tablet Take 400 mg by mouth once daily.      metformin (GLUCOPHAGE-XR) 500 MG 24 hr tablet Take 500 mg by mouth 2 (two) times daily with meals.               Procedures: none    Consultants: neurology      Discharge Diet:diabetic diet: 2000 calorie with Normal Fluid intake of 1500 - 2000 mL per day    Activity: ambulate in house with assistance    Discharge Condition: Stable    Disposition: Nursing Facility    Tests pending at the time of discharge: none      Time spent  on the discharge of the patient including review of hospital course with the patient, reviewing discharge medications and arranging follow-up care: 35    Discharge examination Patient was seen and examined on  the date of discharge and determined to be suitable for discharge.    Discharge plan and follow up:  Future Appointments   Date Time Provider Department Center   1/23/2019  4:00 PM Dona Mccullough MD [facility] IM [facility]   1/29/2019  9:00 AM Ivan Bajwa MD [facility] PB EPI [facility]   2/8/2019  1:30 PM [facility] EMG PROCEDURAL LAB [facility] NEURO [facility]   2/11/2019  2:20 PM Corwin Ortiz MD [facility] NEURO [facility]   2/26/2019 11:45 AM Virginia Aquino MD [facility] HAND [facility]       Tessie Mai MD

## 2019-01-14 NOTE — PLAN OF CARE
[facility]     Department of Hospital Medicine     [address] [place] [address]     [phone] after hours  [phone] fax       NURSING HOME ORDERS    01/15/2019    Admit to Nursing Home:  Regular Bed[facility]    Diagnoses:  Active Hospital Problems    Diagnosis  POA    *Debility [R53.81]  Yes    Immobility [Z74.09]  Yes     Priority: 1 - High    Essential hypertension [I10]  Yes     Priority: 2     Type 2 diabetes mellitus without complication, without long-term current use of insulin [E11.9]  Yes     Priority: 3     Vitamin B12 deficiency anemia [D51.9]  Yes     Priority: 4     Pure hypercholesterolemia [E78.00]  Yes     Priority: 5     Contracture of muscle, right upper arm [M62.421]  Yes    Weakness of both lower extremities [R29.898]  Yes    Gait disturbance [R26.9]  Yes    Localized edema [R60.0]  Yes      Resolved Hospital Problems   No resolved problems to display.       Patient is homebound due to:  Debility    Allergies:  Review of patient's allergies indicates:   Allergen Reactions    Pcn [penicillins]        Vitals:  Once weekly        Diet: Diabetic diet   Supplement:  1 can every three times a day with meals                         Type:  Glucerna       LABS:  Per facility protocol    Nursing Precautions:    - Aspiration precautions:             - Total assistance with meals            -  Upright 90 degrees befor during and after meals             -  Suction at bedside          - Fall precautions per nursing home protocol   - Decubitus precautions:        -  for positioning   - Pressure reducing foam mattress   - Turn patient every two hours. Use wedge pillows to anchor patient    CONSULTS:     Physical Therapy to evaluate and treat     Occupational Therapy to evaluate and treat      MISCELLANEOUS CARE:      Routine Skin for Bedridden Patients:  Apply moisture barrier cream to all    skin folds and wet areas in perineal  area daily and after baths and                           all bowel movements.                 DIABETES CARE:        Check blood sugar:      Fingerstick blood sugar AC and HS   Fingerstick blood sugar every 6 hours if unable to eat      Report CBG < 60 or > 400 to physician.                                          Insulin Sliding Scale          Glucose  Novolog Insulin Subcutaneous        0 - 60   Orange juice or glucose tablet, hold insulin      No insulin   201-250  2 units   251-300  4 units   301-350  6 units   351-400  8 units   >400   10 units then call physician      Medications: Discontinue all previous medication orders, if any. See new list below.     Jonatan Deisi   Home Medication Instructions FCO:[id number]    Printed on:01/14/19 0438   Medication Information                      acetaminophen (TYLENOL) 325 MG tablet  Take 2 tablets (650 mg total) by mouth every 4 (four) hours as needed (Mild 1-3/10 pain or temperature > 101 F).             alendronate (FOSAMAX) 70 MG tablet  Take 1 tablet (70 mg total) by mouth every 7 days.             aspirin (ECOTRIN) 81 MG EC tablet  Take 81 mg by mouth once daily.             atorvastatin (LIPITOR) 40 MG tablet  Take 40 mg by mouth once daily.             carvedilol (COREG) 25 MG tablet  Take 1 tablet (25 mg total) by mouth 2 (two) times daily with meals.             cyanocobalamin (VITAMIN B-12) 1000 MCG tablet  Take 1 tablet (1,000 mcg total) by mouth once daily.             HYDROcodone-acetaminophen (NORCO)  mg per tablet  Take 1 tablet by mouth every 4 (four) hours as needed for pain.             lisinopril (PRINIVIL,ZESTRIL) 40 MG tablet  Take 40 mg by mouth once daily.             magnesium oxide (MAG-OX) 400 mg (241.3 mg magnesium) tablet  Take 400 mg by mouth once daily.             metformin (GLUCOPHAGE-XR) 500 MG 24 hr tablet  Take 500 mg by mouth 2 (two) times daily with meals.             polyethylene glycol (GLYCOLAX) 17 gram PwPk  Take  17 g by mouth once daily.             SITagliptin (JANUVIA) 50 MG Tab  Take 1 tablet (50 mg total) by mouth once daily.                       _________________________________  Shanelle Crook MD  01/15/2019

## 2019-01-15 NOTE — PLAN OF CARE
Patient will not DC today. Plan is for the patient to DC Wednesday. CASTILLO informed patient's DIL, Kayli.     CASTILLO sent all orders to [facility]. CASTILLO received call from [facility] stating that the orders need to be signed by the physician. CASTILLO explained that the orders were signed in Epic and sent the orders with the time they were signed. Jagruti at [facility] stated that the DON still needs the orders physically signed.     The facility's cut off time is 2PM and Jagruti at [facility] stated that the patient has to be in the building by 2PM.     CASTILLO received signed orders from Dr. Crook. CASTILLO faxed the orders to [facility].        01/15/19 1330   Post-Acute Status   Post-Acute Authorization Placement   Post-Acute Placement Status (Facility Delay. )     UPDATE 2:40 PM  Change of plans. Patient will DC today to [facility]. Please refer to note at 2:34PM.     Maria Del Rosario Umanzor, WILSON  [facility]  [id number]

## 2019-01-15 NOTE — PLAN OF CARE
Problem: Adult Inpatient Plan of Care  Goal: Plan of Care Review  Outcome: Ongoing (interventions implemented as appropriate)   01/15/19 0224   Plan of Care Review   Plan of Care Reviewed With patient   Progress improving       Fall Risk  Patient will know how and when to properly prevent or manage fall risks specific to changing conditions in mobility and general health.  Patient will identify injury-free environments for reference and any unsafe or questionable conditions within the immediate area of mobility.  Patient will work with staff to effectively communicate assistance needs, accordingly.     Patient will work with staff to promote safer environmental conditions when ambulating.   Use of assistive devices will be used until healthcare providers and staff have agreed upon discontinuation.  With assistance from PT/OT, patient will understand physical limitations and work within what is tolerable.    Comorbidity Mgmt  Establish target blood glucose levels with patient specific factors.  Patient will understand and know how to treat glycemic fluctuations day to day, hour to hour.   Patient will be able to recite all complications of diabetes within their personal diagnosis profile.    Patient will contact nurse immediately for symptoms of low blood glucose (ie. tremors, sweating, drowsiness, weakness, headache, visual disturbances, etc.)    PT/OT Goals  Functional independence will be promoted and assessed regularly.  Ideally, patient will progress - with assistance - by gradually increasing active ROM skills.    Patient may start with ADLs in bed with light supervision.

## 2019-01-15 NOTE — PLAN OF CARE
Patient transferred to [facility] via ambulance. Patient vital signs within normal limits. No distress noted.

## 2019-01-15 NOTE — PLAN OF CARE
Patient will admit to [facility] today.     CASTILLO sent updated orders to [facility] via [employer] and also paper faxed to [phone].        01/15/19 0852   Post-Acute Status   Post-Acute Authorization Placement   Post-Acute Placement Status Authorization Obtained     Maria Del Rosario Umanzor LMSW  [facility]  [id number]

## 2019-01-15 NOTE — PLAN OF CARE
Patient WILL discharge today to [facility].     Nurse call report to [phone]. Station 2; RM# [id number].    SW arranged stretcher transport via Patient Flow [facility]. Requested  time is 3:40PM.  Requested  time does not guarantee arrival time. PHN auth # [id number].    SW notified nurse of the above. [employer] for [place] is in patient's [place] packet and SW confirmed with nurse that he received the packet.     SW notified patient's DILKayli, of the above.      01/15/19 1434   Post-Acute Status   Post-Acute Authorization Placement   Post-Acute Placement Status Authorization Obtained     Maria Del Rosario Umanzor LMSW  [facility]  [id number]

## 2019-01-16 NOTE — PROGRESS NOTES
Please note the following patient's information was forwarded to [facility] ([facility]) for case management and/or  on 1/14/19.    Please see the media section of patient's chart for additional details.    Please contact Ext. [phone] with any questions.    Thank you,    Kayli Almanzar, [facility]  Outpatient Care Mgmt.  [phone]

## 2019-01-29 NOTE — LETTER
January 29, 2019      Carole Hall PA-C  [address]  [facility] [address]           [facility] - Neurology Epilepsy  [address]  [facility] [address]  Phone: [phone]  Fax: [phone]          Patient: Deisi Cheung   MR Number: [id number]   YOB: 1935   Date of Visit: 1/29/2019       Dear Carole Hall:    Thank you for referring Deisi Cheung to me for evaluation. Attached you will find relevant portions of my assessment and plan of care.    If you have questions, please do not hesitate to call me. I look forward to following Deisi Cheung along with you.    Sincerely,    Ivan Bajwa MD    Enclosure  CC:  No Recipients    If you would like to receive this communication electronically, please contact [email or web address] or [phone] to request more information on [employer] access.    For providers and/or their staff who would like to refer a patient to [facility], please contact us through our one-stop-shop provider referral line, [facility] , at [phone].    If you feel you have received this communication in error or would no longer like to receive these types of communications, please e-mail [email or web address]

## 2019-01-29 NOTE — PATIENT INSTRUCTIONS
Having EMG and NCS Tests  You will be having electromyography (EMG) and nerve conduction studies (NCS) to measure muscle and nerve function. In most cases, both tests are done. NCS is most often done first. You will be asked to lie on an exam table with a blanket over you. You may have one or both of the following:    Nerve conduction study (NCS)  During NCS, mild electrical currents are used to test how fast impulses move along your nerves. The healthcare provider will put small metal disks (electrodes) on your skin on the area of your body being tested. This will be done using water-based gel or paste. A doctor or technologist will apply mild electrical currents to your skin. Your muscles will twitch, but the test wont harm you. Currents are usually applied to the same area several times. Usually the intensity of the electrical stimulation is increased on each body part. Despite some increasing discomfort that varies from person to person, the electrical shock is not dangerous. The test may continue on other parts of your body unless the reason for doing the test is limited to a small part of the body.  Electromyography (EMG)  Most of the electrodes will be removed for EMG. The doctor will clean the area being tested with alcohol. A very fine needle will be put into the muscles in this region. When the needle is inserted, you may feel as if your skin is being pinched. Try to relax and do as instructed, since you will be asked to relax and contract the muscle being tested. Following instructions will allow your doctor to interpret the test results.  Let the technologist know  For your safety and for the success of your test, tell the technologist if you:  · Have any bleeding problems.  · Take blood thinners (anticoagulants) or other medications, including aspirin.  · Have any immune system problems.  · Have had neck or back surgery.  You may also be asked questions about your overall health.  Before the  test  Prepare for your test as instructed. Shower or bathe, but don't use powder, oil, or lotion. Your skin should be clean and free of excess oil. Wear loose clothes. But know that you may be asked to change into a hospital gown. The entire test will take about 60 minutes. Be sure to allow extra time to check in.  After your test  Before you leave, all electrodes will be removed. You can then get right back to your normal routine. If you feel tired or have some discomfort, take it easy. If you were told to stop taking any medicines for your test, ask when you can start taking them again. Your doctor will let you know when your test results are ready.  Date Last Reviewed: 9/12/2015 © 2000-2017 [employer]. [address] [place] [address], [place] [address]. All rights reserved. This information is not intended as a substitute for professional medical care. Always follow your healthcare professional's instructions.

## 2019-01-29 NOTE — PROGRESS NOTES
[facility] - NEUROLOGY EPILEPSY  [facility] [place]    Date: 1/29/19  Patient Name: Deisi Cheung   MRN: [id number]   PCP: Santos Mena  Referring Provider: Carole Hall PA-C    Assessment:   Deisi Cheung is a 83 y.o. female presenting follow-up for evaluation of right greater than left upper extremity weakness and contracture. Workup thus far as detailed below has been unrevealing.  Will move forward with EMG/nerve conduction study as planned    Plan:     Problem List Items Addressed This Visit     None      Visit Diagnoses     Contracture of hand    -  Primary    Relevant Orders    EMG W/ ULTRASOUND AND NERVE CONDUCTION TEST 2 Extremities    Other symptoms and signs involving the musculoskeletal system         Relevant Orders    EMG W/ ULTRASOUND AND NERVE CONDUCTION TEST 2 Extremities          Ivan Bajwa MD  [facility]   Department of Neurology    Patient note was created using MModal Dictation.  Any errors in syntax or even information may not have been identified and edited on initial review prior to signing this note.  Subjective:   Patient seen in consultation at the request of Carole Hall PA-C for the evaluation of upper extremity weakness. A copy of this note will be sent to the referring physician.        HPI:   Ms. Deisi Cheung is a 83 y.o. female presenting for evaluation of bilateral hand weakness and pain.  The patient presented to the emergency room on 12/17/2018 per review of records complaining of right hand pain. Reported records the pain has been going on for at least 5 months however her family indicates this may have been for at least years.  She was found to have significant right greater than left upper extremity contractures at that time as well as diffuse fasciculations in the left upper extremity.  She has undergone MRI cervical spine with and without contrast which showed multilevel degenerative changes without evidence  significant stenosis and only mild  neuroforaminal encroachment.  She presents today with her family who today are predominantly concerned about patient's letting being stuck on her finger.  Patient had been recommended to undergo EMG/nerve study however this has not been performed yet.    PAST MEDICAL HISTORY:  Past Medical History:   Diagnosis Date    Arthritis of right knee     Contracture of muscle, right upper arm 12/18/2018    Diabetes mellitus     Hyperlipidemia     Hypertension     Primary osteoarthritis of right knee     Pure hypercholesterolemia 10/19/2015    Weakness of both lower extremities 12/18/2018       PAST SURGICAL HISTORY:  Past Surgical History:   Procedure Laterality Date    none         CURRENT MEDS:  Current Outpatient Medications   Medication Sig Dispense Refill    acetaminophen (TYLENOL) 325 MG tablet Take 2 tablets (650 mg total) by mouth every 4 (four) hours as needed (Mild 1-3/10 pain or temperature > 101 F).  0    alendronate (FOSAMAX) 70 MG tablet Take 1 tablet (70 mg total) by mouth every 7 days. 4 tablet 11    aspirin (ECOTRIN) 81 MG EC tablet Take 81 mg by mouth once daily.      atorvastatin (LIPITOR) 40 MG tablet Take 40 mg by mouth once daily.      carvedilol (COREG) 25 MG tablet Take 1 tablet (25 mg total) by mouth 2 (two) times daily with meals. 180 tablet 3    cyanocobalamin (VITAMIN B-12) 1000 MCG tablet Take 1 tablet (1,000 mcg total) by mouth once daily.      HYDROcodone-acetaminophen (NORCO)  mg per tablet Take 1 tablet by mouth every 4 (four) hours as needed. 20 tablet 0    lisinopril (PRINIVIL,ZESTRIL) 40 MG tablet Take 40 mg by mouth once daily.      magnesium oxide (MAG-OX) 400 mg (241.3 mg magnesium) tablet Take 400 mg by mouth once daily.      metformin (GLUCOPHAGE-XR) 500 MG 24 hr tablet Take 500 mg by mouth 2 (two) times daily with meals.      NOVOLOG FLEXPEN U-100 INSULIN 100 unit/mL InPn pen       polyethylene glycol (GLYCOLAX) 17 gram  PwPk Take 17 g by mouth once daily.  0    SITagliptin (JANUVIA) 50 MG Tab Take 1 tablet (50 mg total) by mouth once daily.       No current facility-administered medications for this visit.        ALLERGIES:  Review of patient's allergies indicates:   Allergen Reactions    Pcn [penicillins]        FAMILY HISTORY:  Family History   Problem Relation Age of Onset    Heart failure Sister     Prostate cancer Brother     Stroke Sister     Diabetes Sister        SOCIAL HISTORY:  Social History     Tobacco Use    Smoking status: Never Smoker    Smokeless tobacco: Never Used   Substance Use Topics    Alcohol use: No     Alcohol/week: 0.0 oz     Frequency: Never    Drug use: No       Review of Systems:  12 system review of systems is negative except for the symptoms mentioned in HPI.      Objective:     Vitals:    01/29/19 0916   BP: 128/75   Pulse: 78     General: NAD, well nourished   Eyes: no tearing, discharge, no erythema   ENT: moist mucous membranes of the oral cavity, nares patent    Neck: Supple, full range of motion  Cardiovascular: Warm and well perfused, pulses equal and symmetrical  Lungs: Normal work of breathing, normal chest wall excursions  Skin: No rash, lesions, or breakdown on exposed skin  Psychiatry: Mood and affect are appropriate   Abdomen: soft, non tender, non distended  Extremeties: No cyanosis, clubbing or edema.    Neurological   MENTAL STATUS: Alert and oriented to person, place, and time. Attention and concentration within normal limits. Speech without dysarthria, able to name and repeat without difficulty. Recent and remote memory within normal limits   CRANIAL NERVES: Visual fields intact. PERRL. EOMI. Facial sensation intact. Face symmetrical. Hearing grossly intact. Full shoulder shrug bilaterally. Tongue protrudes midline   SENSORY: Sensation is intact to light touch throughout.    MOTOR:  4/5 deltoid, biceps, triceps, 2/5 interosseous, 1/5 hand  bilaterally. Fasciculations  noted throughout LUE with bilateral interossei wasting. 4/5 iliopsoas, knee extension/flexion, foot dorsi/plantarflexion bilaterally.    REFLEXES: Symmetric and 2+ throughout.  CEREBELLAR/COORDINATION/GAIT: Gait steady with normal arm swing and stride length.   Finger to nose intact. Normal rapid alternating movements.

## 2019-03-04 NOTE — TELEPHONE ENCOUNTER
Staff contacted the patient to confirm her 3/6/19 9:30 am Consultation with Dr. Urban. Patient can call [phone] if she needs to schedule if not we look forward to seeing you 3/6/19 9:30 am.    Patient did not answer therefore staff left a detailed voice message including the above information.

## 2019-03-06 NOTE — LETTER
March 7, 2019      Dona Mccullough MD  [address]  [place] [address]           [facility] [address]  [address]  [place] [address]  Phone: [phone]  Fax: [phone]          Patient: Deisi Cheung   MR Number: [id number]   YOB: 1935   Date of Visit: 3/6/2019       Dear Dr. Dona Mccullough:    Thank you for referring Deisi Cheung to me for evaluation. Attached you will find relevant portions of my assessment and plan of care.    If you have questions, please do not hesitate to call me. I look forward to following Deisi Cheung along with you.    Sincerely,    Ricardo Urban MD    Enclosure  CC:  No Recipients    If you would like to receive this communication electronically, please contact [email or web address] or [phone] to request more information on [facility] access.    For providers and/or their staff who would like to refer a patient to [facility], please contact us through our one-stop-shop provider referral line, [facility], at [phone].    If you feel you have received this communication in error or would no longer like to receive these types of communications, please e-mail [email or web address]

## 2019-03-06 NOTE — PROGRESS NOTES
Chronic Pain - New Consult    Referring Physician: Dona Mccullough MD    Chief Complaint:   Chief Complaint   Patient presents with    Hand Pain        SUBJECTIVE:    Deisi Cheung presents to the clinic for the evaluation of bilateral hand pain. The pain started 3 months ago following unknown reason and symptoms have been worsening.The pain is located in the bilateral hand area and is localized.  The pain is described as aching, numbing, shooting and hot, constant  and is rated as 8/10. The pain is rated with a score of  10/10 on the BEST day and a score of 10/10 on the WORST day.  Symptoms interfere with daily activity and sleeping. The pain is exacerbated by Sitting, Touching, Night Time and Morning.  The pain is mitigated by nothing. She reports spending 3 hours per day reclining. The patient reports ? hours of uninterrupted sleep per night.    Patient denies night fever/night sweats, urinary incontinence, bowel incontinence, significant weight loss and significant motor weakness.    Physical Therapy/Home Exercise: no      Pain Disability Index Review:  Last 3 PDI Scores 3/6/2019   Pain Disability Index (PDI) 61       Pain Medications:    - Opioids: Lortab ( Hydrocodone/Acetaminophen)  - Adjuvant Medications: n/a  - Anti-Coagulants: Aspirin  - Others: tylenol 325mg, see medication list      report:  Reviewed and consistent with medication use as prescribed.    Pain Procedures:  None.    Imagin18 US Right Upper Extremity  Narrative     EXAMINATION:  US UPPER EXTREMITY VEINS RIGHT    CLINICAL HISTORY:  right upper extremity swelling;    TECHNIQUE:  Duplex and color flow Doppler evaluation and dynamic compression was performed of the right upper extremity veins.    COMPARISON:  None    FINDINGS:  Central veins: The internal jugular, subclavian, and axillary veins are patent and free of thrombus.    Arm veins: The brachial, basilic, and cephalic veins are patent and  compressible.    Contralateral subclavian/internal jugular veins: The left subclavian and internal jugular veins are patent and free of thrombus.    Other findings: None.      Impression       No thrombus in central veins of the right upper extremity.           Past Medical History:   Diagnosis Date    Arthritis of right knee     Contracture of muscle, right upper arm 12/18/2018    Diabetes mellitus     Hyperlipidemia     Hypertension     Primary osteoarthritis of right knee     Pure hypercholesterolemia 10/19/2015    Weakness of both lower extremities 12/18/2018     Past Surgical History:   Procedure Laterality Date    none       Social History     Socioeconomic History    Marital status:      Spouse name: Not on file    Number of children: Not on file    Years of education: Not on file    Highest education level: Not on file   Social Needs    Financial resource strain: Not on file    Food insecurity - worry: Not on file    Food insecurity - inability: Not on file    Transportation needs - medical: Not on file    Transportation needs - non-medical: Not on file   Occupational History    Not on file   Tobacco Use    Smoking status: Never Smoker    Smokeless tobacco: Never Used   Substance and Sexual Activity    Alcohol use: No     Alcohol/week: 0.0 oz     Frequency: Never    Drug use: No    Sexual activity: No   Other Topics Concern    Not on file   Social History Narrative    Lives alone      Family History   Problem Relation Age of Onset    Heart failure Sister     Prostate cancer Brother     Stroke Sister     Diabetes Sister        Review of patient's allergies indicates:   Allergen Reactions    Pcn [penicillins]        Current Outpatient Medications   Medication Sig    acetaminophen (TYLENOL) 325 MG tablet Take 2 tablets (650 mg total) by mouth every 4 (four) hours as needed (Mild 1-3/10 pain or temperature > 101 F).    alendronate (FOSAMAX) 70 MG tablet Take 1 tablet (70 mg  total) by mouth every 7 days.    aspirin (ECOTRIN) 81 MG EC tablet Take 81 mg by mouth once daily.    atorvastatin (LIPITOR) 40 MG tablet Take 40 mg by mouth once daily.    carvedilol (COREG) 25 MG tablet Take 1 tablet (25 mg total) by mouth 2 (two) times daily with meals.    cyanocobalamin (VITAMIN B-12) 1000 MCG tablet Take 1 tablet (1,000 mcg total) by mouth once daily.    HYDROcodone-acetaminophen (NORCO)  mg per tablet Take 1 tablet by mouth every 4 (four) hours as needed.    lisinopril (PRINIVIL,ZESTRIL) 40 MG tablet Take 40 mg by mouth once daily.    magnesium oxide (MAG-OX) 400 mg (241.3 mg magnesium) tablet Take 400 mg by mouth once daily.    metformin (GLUCOPHAGE-XR) 500 MG 24 hr tablet Take 500 mg by mouth 2 (two) times daily with meals.    NOVOLOG FLEXPEN U-100 INSULIN 100 unit/mL InPn pen     polyethylene glycol (GLYCOLAX) 17 gram PwPk Take 17 g by mouth once daily.    SITagliptin (JANUVIA) 50 MG Tab Take 1 tablet (50 mg total) by mouth once daily.    gabapentin (NEURONTIN) 100 MG capsule Take 1 capsule (100 mg total) by mouth 3 (three) times daily. Take 1 capsule at night for 1 week, then take 1 capsule twice a day for a week, then take 1 capsule 3x/day, thereafter.    gabapentin (NEURONTIN) 100 MG capsule Take 1 capsule (100 mg total) by mouth 3 (three) times daily. Take 1 capsule at night for 1 week, then take 1 capsule twice a day for a week, then take 1 capsule 3x/day, thereafter.    naproxen (NAPROSYN) 500 MG tablet Take 1 tablet (500 mg total) by mouth 2 (two) times daily with meals.    naproxen (NAPROSYN) 500 MG tablet Take 1 tablet (500 mg total) by mouth 2 (two) times daily with meals.     No current facility-administered medications for this visit.        REVIEW OF SYSTEMS:    GENERAL:  No weight loss, malaise or fevers.  HEENT:  Negative for frequent or significant headaches.  NECK:  Negative for lumps, goiter, pain and significant neck swelling.  RESPIRATORY:  Negative  "for cough, wheezing or shortness of breath.  CARDIOVASCULAR:  Negative for chest pain, leg swelling or palpitations.  GI:  Negative for abdominal discomfort, blood in stools or black stools or change in bowel habits.  MUSCULOSKELETAL:  See HPI.  SKIN:  Negative for lesions, rash, and itching.  PSYCH:  Negative for sleep disturbance, mood disorder and recent psychosocial stressors.  HEMATOLOGY/LYMPHOLOGY:  Negative for prolonged bleeding, bruising easily or swollen nodes.  NEURO:   No history of headaches, syncope, paralysis, seizures or tremors.  All other reviewed and negative other than HPI.    OBJECTIVE:    /88   Pulse 72   Temp 98.2 °F (36.8 °C) (Oral)   Resp 18   Ht 5' 2" (1.575 m)   Wt 55.3 kg (122 lb)   BMI 22.31 kg/m²     PHYSICAL EXAMINATION:    General appearance: Well appearing, in no acute distress, alert and oriented x3.  Psych:  Mood and affect appropriate.  Skin: Skin color, texture, turgor normal, no rashes or lesions, in both upper and lower body.  Head/face:  Normocephalic, atraumatic. No palpable lymph nodes.  Neck: No pain to palpation over the cervical paraspinous muscles. Spurling Negative. No pain with neck flexion, extension, or lateral flexion.   Cor: RRR  Pulm: CTA  GI:  Soft and non-tender.  Back: Straight leg raising in the sitting and supine positions is negative to radicular pain. No pain to palpation over the spine or costovertebral angles. Normal range of motion without pain reproduction.  Extremities: Peripheral joint ROM is full and pain free without obvious instability or laxity in all four extremities. Edema of the the left wrist, MCPs and PIPs with mild deformity. No skin discoloration. Good capillary refill.  Musculoskeletal: Shoulder, hip, sacroiliac and knee provocative maneuvers are negative. Bilateral upper and lower extremity strength is normal and symmetric.  No atrophy or tone abnormalities are noted.  Neuro: Decreased bilateral upper and lower extremity " coordination and muscle stretch and reflexes.  Plantar response are downgoing. No loss of sensation is noted.  Gait: Unable to walk.    ASSESSMENT: 83 y.o. year old female with chronic bilateral upper extremity pain and weakness associated with left hand swelling. The work up by Neurology in Orthopedic Hand surgery including imaging of the brain, cervical, thoracic spine a in the hand has been negative.  She was not able to tolerate EMG.  The etiology of the upper extremity weakness and swelling is not known at this point.  She is currently taking Norco as needed for pain control.  We will start gabapentin 100 mg at bedtime and increase as tolerated to 100 mg t.i.d. and naproxen 500 mg b.i.d. as needed.  I will also refer her to rheumatology for further evaluation and encouraged to continue to follow-up with neurology to find the etiology her symptoms.  She is currently participating in physical therapy at the nursing home and I strongly encouraged her to continue physical therapy and ordered new course.    1. Arthritis  Ambulatory Referral to Rheumatology    Ambulatory consult to Physical Therapy   2. Pain in both hands  Ambulatory consult to Physical Therapy   3. Contracture of muscle, right upper arm     4. Localized swelling on right hand     5. Immobility           PLAN:     - I have stressed the importance of physical activity and a home exercise plan to help with pain and improve health.  - Referral to Physical therapy for Lumbar stabilization, core strengthening, and a home exercise program.  - Start Neurontin 100mg gradually to three times a day to help with radicular pain.  - Start Naproxen 500 mg BID as needed.  - Referred to Rheumatology.  - Encouraged to continue follow up with Neurology.  - RTC 4 weeks.  - Counseled patient regarding the importance of activity modification and physical therapy.    The above plan and management options were discussed at length with patient. Patient is in agreement with  the above and verbalized understanding. It will be communicated with the referring physician via electronic record, fax, or mail.    Ricardo Urban  03/07/2019

## 2019-03-06 NOTE — TELEPHONE ENCOUNTER
----- Message from Nirmala Elizondo sent at 3/6/2019  1:07 PM CST -----  Contact: Liliana from [facility]   Name of Who is Calling: Liliana from [facility]     What is the request in detail: Liliana from [facility]  Is requesting that naproxen (NAPROSYN) 500 MG tablet and gabapentin (NEURONTIN) 100 MG capsule [facility] - [address] [place]YANKA YARBROUGH....Please contact to further discuss and advise      Can the clinic reply by [facility]:     What Number to Call Back if not in [facility]: [phone]

## 2019-04-03 PROBLEM — M79.642 PAIN IN BOTH HANDS: Status: ACTIVE | Noted: 2019-01-01

## 2019-04-03 PROBLEM — M79.641 PAIN IN BOTH HANDS: Status: ACTIVE | Noted: 2019-01-01

## 2019-04-03 NOTE — PROGRESS NOTES
Chronic patient Established Note (Follow up visit)    Deisi Cheung presents to the clinic for the evaluation of bilateral hand pain. The pain started 3 months ago following unknown reason and symptoms have been worsening.The pain is located in the bilateral hand area and is localized.  The pain is described as aching, numbing, shooting and hot, constant  and is rated as 8/10. The pain is rated with a score of  10/10 on the BEST day and a score of 10/10 on the WORST day.  Symptoms interfere with daily activity and sleeping. The pain is exacerbated by Sitting, Touching, Night Time and Morning.  The pain is mitigated by nothing. She reports spending 3 hours per day reclining. The patient reports ? hours of uninterrupted sleep per night.    Interval History 4/3/2019:      SUBJECTIVE:      Deisi Cheung presents to the clinic for a follow-up appointment for 4 week follow up bilateral hand pain. Since the last visit, Deisi Cheung states the pain has been persistant. Current pain intensity is 9/10. She has her rheum appt scheduled for the end of this month and her nursing facility has failed to provide the pt w/ appropriate PT/OT. She is tolerating the gabapentin well, no c/o fatigue.    Pain Disability Index Review:  Last 3 PDI Scores 4/3/2019 3/6/2019   Pain Disability Index (PDI) 64 61       Pain Medications:    - Opioids: Lortab ( Hydrocodone/Acetaminophen)  - Adjuvant Medications: gabapentin 100mg TID  - Anti-Coagulants: Aspirin  - Others: tylenol 325mg, see medication list          Opioid Contract: not applicable     report:  Reviewed and consistent with medication use as prescribed.    Pain Procedures:  none    Physical Therapy/Home Exercise: no    Imagin18 US Right Upper Extremity  Narrative       EXAMINATION:  US UPPER EXTREMITY VEINS RIGHT    CLINICAL HISTORY:  right upper extremity swelling;    TECHNIQUE:  Duplex and color flow Doppler evaluation and dynamic compression was performed of the  right upper extremity veins.    COMPARISON:  None    FINDINGS:  Central veins: The internal jugular, subclavian, and axillary veins are patent and free of thrombus.    Arm veins: The brachial, basilic, and cephalic veins are patent and compressible.    Contralateral subclavian/internal jugular veins: The left subclavian and internal jugular veins are patent and free of thrombus.    Other findings: None.       Impression         No thrombus in central veins of the right upper extremity.                       Allergies:   Review of patient's allergies indicates:   Allergen Reactions    Pcn [penicillins]        Current Medications:   Current Outpatient Medications   Medication Sig Dispense Refill    acetaminophen (TYLENOL) 325 MG tablet Take 2 tablets (650 mg total) by mouth every 4 (four) hours as needed (Mild 1-3/10 pain or temperature > 101 F).  0    alendronate (FOSAMAX) 70 MG tablet Take 1 tablet (70 mg total) by mouth every 7 days. 4 tablet 11    aspirin (ECOTRIN) 81 MG EC tablet Take 81 mg by mouth once daily.      atorvastatin (LIPITOR) 40 MG tablet Take 40 mg by mouth once daily.      carvedilol (COREG) 25 MG tablet Take 1 tablet (25 mg total) by mouth 2 (two) times daily with meals. 180 tablet 3    cyanocobalamin (VITAMIN B-12) 1000 MCG tablet Take 1 tablet (1,000 mcg total) by mouth once daily.      gabapentin (NEURONTIN) 100 MG capsule Take 1 capsule (100 mg total) by mouth 3 (three) times daily. Take 1 capsule at night for 1 week, then take 1 capsule twice a day for a week, then take 1 capsule 3x/day, thereafter. 90 capsule 2    gabapentin (NEURONTIN) 100 MG capsule Take 1 capsule (100 mg total) by mouth 3 (three) times daily. Take 1 capsule at night for 1 week, then take 1 capsule twice a day for a week, then take 1 capsule 3x/day, thereafter. 90 capsule 2    HYDROcodone-acetaminophen (NORCO)  mg per tablet Take 1 tablet by mouth every 4 (four) hours as needed. 20 tablet 0    lisinopril  (PRINIVIL,ZESTRIL) 40 MG tablet Take 40 mg by mouth once daily.      magnesium oxide (MAG-OX) 400 mg (241.3 mg magnesium) tablet Take 400 mg by mouth once daily.      metformin (GLUCOPHAGE-XR) 500 MG 24 hr tablet Take 500 mg by mouth 2 (two) times daily with meals.      naproxen (NAPROSYN) 500 MG tablet Take 1 tablet (500 mg total) by mouth 2 (two) times daily with meals. 60 tablet 2    naproxen (NAPROSYN) 500 MG tablet Take 1 tablet (500 mg total) by mouth 2 (two) times daily with meals. 60 tablet 1    NOVOLOG FLEXPEN U-100 INSULIN 100 unit/mL InPn pen       polyethylene glycol (GLYCOLAX) 17 gram PwPk Take 17 g by mouth once daily.  0    SITagliptin (JANUVIA) 50 MG Tab Take 1 tablet (50 mg total) by mouth once daily.       No current facility-administered medications for this visit.        REVIEW OF SYSTEMS:    GENERAL:  No weight loss, malaise or fevers.  HEENT:  Negative for frequent or significant headaches.  NECK:  Negative for lumps, goiter, pain and significant neck swelling.  RESPIRATORY:  Negative for cough, wheezing or shortness of breath.  CARDIOVASCULAR:  Negative for chest pain, leg swelling or palpitations.  GI:  Negative for abdominal discomfort, blood in stools or black stools or change in bowel habits.  MUSCULOSKELETAL:  See HPI.  SKIN:  Negative for lesions, rash, and itching.  PSYCH:  Negative for sleep disturbance, mood disorder and recent psychosocial stressors.  HEMATOLOGY/LYMPHOLOGY:  Negative for prolonged bleeding, bruising easily or swollen nodes.  NEURO:   No history of headaches, syncope, paralysis, seizures or tremors.  All other reviewed and negative other than HPI.    Past Medical History:  Past Medical History:   Diagnosis Date    Arthritis of right knee     Contracture of muscle, right upper arm 12/18/2018    Diabetes mellitus     Hyperlipidemia     Hypertension     Primary osteoarthritis of right knee     Pure hypercholesterolemia 10/19/2015    Weakness of both lower  "extremities 12/18/2018       Past Surgical History:  Past Surgical History:   Procedure Laterality Date    none         Family History:  Family History   Problem Relation Age of Onset    Heart failure Sister     Prostate cancer Brother     Stroke Sister     Diabetes Sister        Social History:  Social History     Socioeconomic History    Marital status:      Spouse name: Not on file    Number of children: Not on file    Years of education: Not on file    Highest education level: Not on file   Occupational History    Not on file   Social Needs    Financial resource strain: Not on file    Food insecurity:     Worry: Not on file     Inability: Not on file    Transportation needs:     Medical: Not on file     Non-medical: Not on file   Tobacco Use    Smoking status: Never Smoker    Smokeless tobacco: Never Used   Substance and Sexual Activity    Alcohol use: No     Alcohol/week: 0.0 oz     Frequency: Never    Drug use: No    Sexual activity: Never   Lifestyle    Physical activity:     Days per week: Not on file     Minutes per session: Not on file    Stress: Not on file   Relationships    Social connections:     Talks on phone: Not on file     Gets together: Not on file     Attends Alevism service: Not on file     Active member of club or organization: Not on file     Attends meetings of clubs or organizations: Not on file     Relationship status: Not on file   Other Topics Concern    Not on file   Social History Narrative    Lives alone        OBJECTIVE:    /67   Pulse 74   Temp 98.5 °F (36.9 °C) (Oral)   Resp 18   Ht 5' 2" (1.575 m)   Wt 41.3 kg (91 lb)   BMI 16.64 kg/m²     PHYSICAL EXAMINATION:    General appearance: Well appearing, in no acute distress, alert and oriented x3.  Psych:  Mood and affect appropriate.  Skin: Skin color, texture, turgor normal, no rashes or lesions, in both upper and lower body.  Head/face:  Normocephalic, atraumatic. No palpable lymph " nodes.  Neck: No pain to palpation over the cervical paraspinous muscles. Spurling Negative. No pain with neck flexion, extension, or lateral flexion.   Cor: RRR  Pulm: CTA  GI:  Soft and non-tender.  Back: Straight leg raising in the sitting and supine positions is negative to radicular pain. No pain to palpation over the spine or costovertebral angles. Normal range of motion without pain reproduction.  Extremities: Peripheral joint ROM is full and pain free without obvious instability or laxity in all four extremities. Edema of the the left wrist, MCPs and PIPs with mild deformity. No skin discoloration. Good capillary refill.  Musculoskeletal: Shoulder, hip, sacroiliac and knee provocative maneuvers are negative. Bilateral upper and lower extremity strength is normal and symmetric.  No atrophy or tone abnormalities are noted.  Neuro: Decreased bilateral upper and lower extremity coordination and muscle stretch and reflexes.  Plantar response are downgoing. No loss of sensation is noted.  Gait: Unable to walk.      ASSESSMENT: 83 y.o. year old female with chronic bilateral upper extremity pain and weakness associated with left hand swelling. The work up by Neurology in Orthopedic Hand surgery including imaging of the brain, cervical, thoracic spine a in the hand has been negative.  She was not able to tolerate EMG.  The etiology of the upper extremity weakness and swelling is not known at this point.  She is currently taking Norco as needed for pain control and tolerating gabapentin 100 mg t.i.d. and naproxen 500 mg b.i.d. as needed.  She will go to rheumatology for further evaluation  At the end of this month and encouraged to continue to follow-up with neurology to find the etiology her symptoms. I strongly encouraged her to continue physical therapy and ordered new course.       1. Immobility  Ambulatory consult to Occupational Therapy    Ambulatory consult to Physical Therapy   2. Pain in both hands   Ambulatory consult to Occupational Therapy    Ambulatory consult to Physical Therapy   3. Localized swelling on right hand     4. Weakness of both lower extremities     5. Contracture of muscle, right upper arm           PLAN:     - I have stressed the importance of physical activity and a home exercise plan to help with pain and improve health.  - Referral to PT/OT for for improving functional; strength.  - Advance Neurontin 300mg gradually to three times a day to help with radicular pain.  - Cont. Naproxen 500 mg BID as needed.  - Referred to Rheumatology.  - Encouraged to continue follow up with Neurology.  - RTC PRN.  - Counseled patient regarding the importance of activity modification and physical therapy.        The above plan and management options were discussed at length with patient. Patient is in agreement with the above and verbalized understanding.      Jesse Mcclelland MD  04/03/2019    I have reviewed and concur with the resident's history, physical, assessment, and plan.  I have personally interviewed and examined the patient at bedside.  See below addendum for my evaluation and additional findings.  83-year-old female with chronic bilateral upper extremity pain, weakness and swelling.  The workup and evaluation with Hand surgery and Neurology has been negative so far.  The strongly recommend evaluation by Rheumatology and continue the workup with neurology and is the etiology of her weakness, swelling and pain is unknown at this point.  We will refer her again to physical therapy and occupational therapy to improve her functionl and strength.  We will increase the gabapentin gradually from 100 mg t.i.d. to 300 mg t.i.d. as tolerated and will continue naproxen 500 mg b.i.d. as needed.    Ricardo Urban MD

## 2019-05-02 NOTE — PROGRESS NOTES
Chief Complaint   Patient presents with    Disease Management       Patient was referred by     History of presenting illness    Patient is a 83 year old female who comes in from the nursing home     She has been there since last year    She comes in with hand arthralgias    I think it is more of weakness and contracture than pain currently   If she raises her arm the swelling gets better  She gets swelling in the hand and arm if she puts it down  She cant move her shoulder above her head too     She also cannot walk now  This started July 2018    The history given by the daughter in law    One day her daughter in law went to visit her : aug 2018    Right hand was swollen  So patient started using left hand  Patient was still doing everything    Nov -dec 2018 this got worse    Then she came to [facility]    All the work up below was done at that time   Neuro saw her : she had fasciculations in the left UE   EMG/NCS was ordered and she cannot do it    Before all this happened she was independent and she would cook,clean and wash    Her right knee needed cortisone shots for OA : July 2018  She needed PT    She did not have a stroke     PT/OT is not helping her    MRI Brain : Moderate generalized cerebral volume loss and moderate chronic microvascular ischemic changes.    US UE : no thrombus    C spine MRI :     No evidence of abnormal pathologic enhancement of the cervical or thoracic spine, noting post-contrast images of the cervical spine are significantly limited by patient motion artifact.  No focal cord signal abnormality identified.  Mild multilevel degenerative changes of the thoracic spine without evidence of significant spinal canal or neural foraminal narrowing.  Apparent soft tissue structure extending above the diaphragmatic hiatus at the level of T9, possibly representing a large hiatal hernia.  Further evaluation could be performed with abdominal CT for confirmation.    Hand xrays  :    Suboptimal positioning on the frontal and oblique views with flexion of the 2nd through 5th digits making evaluation of the middle and distal phalanges difficult..  Bones appear somewhat demineralized.  Alignment is otherwise satisfactory and joint spaces appear fairly well maintained.  No definite evidence of fracture or dislocation.  Mild degenerative changes at some of the interphalangeal joints.  There may be some mild soft tissue swelling at the wrist.    Shoulder xray right :nml    She was in the hospital when this started nov-dec 2018     Her CRP elevated 12.3  Ck nml  RF neg  VICKI pos  > 1: [id number] homogenous  Profile neg  CMP nml  H/H 10.2/33.5  TSH nml    Past history : DM,HTN,HLD    Family history : not known    Social history : not a smoker,alcoholic    Review of Systems   Constitutional: Negative for activity change, appetite change, chills, diaphoresis, fatigue, fever and unexpected weight change.   HENT: Negative for congestion, dental problem, drooling, ear discharge, ear pain, facial swelling, hearing loss, mouth sores, nosebleeds, postnasal drip, rhinorrhea, sinus pressure, sinus pain, sneezing, sore throat, tinnitus, trouble swallowing and voice change.    Eyes: Negative for photophobia, pain, discharge, redness, itching and visual disturbance.   Respiratory: Negative for apnea, cough, choking, chest tightness, shortness of breath, wheezing and stridor.    Cardiovascular: Negative for chest pain, palpitations and leg swelling.   Gastrointestinal: Negative for abdominal distention, abdominal pain, anal bleeding, blood in stool, constipation, diarrhea, nausea, rectal pain and vomiting.   Endocrine: Negative for cold intolerance, heat intolerance, polydipsia, polyphagia and polyuria.   Genitourinary: Negative for decreased urine volume, difficulty urinating, dysuria, enuresis, flank pain, frequency, genital sores, hematuria and urgency.   Musculoskeletal: Negative for arthralgias, back pain, gait  problem, joint swelling, myalgias, neck pain and neck stiffness.   Skin: Negative for color change, pallor, rash and wound.   Allergic/Immunologic: Negative for environmental allergies, food allergies and immunocompromised state.   Neurological: Negative for dizziness, tremors, seizures, syncope, facial asymmetry, speech difficulty, weakness, light-headedness, numbness and headaches.   Hematological: Negative for adenopathy. Does not bruise/bleed easily.   Psychiatric/Behavioral: Negative for agitation, behavioral problems, confusion, decreased concentration, dysphoric mood, hallucinations, self-injury, sleep disturbance and suicidal ideas. The patient is not nervous/anxious and is not hyperactive.      Physical Exam     POWERS-28 tender joint count: 0  POWERS-28 swollen joint count: 0      She really has no synovitis today    All her joints are in severe flexion contracture      Physical Exam   Constitutional: She is oriented to person, place, and time and well-developed, well-nourished, and in no distress. No distress.   HENT:   Head: Normocephalic.   Mouth/Throat: Oropharynx is clear and moist.   Eyes: Conjunctivae are normal. Pupils are equal, round, and reactive to light. Right eye exhibits no discharge. Left eye exhibits no discharge. No scleral icterus.   Neck: Normal range of motion. No thyromegaly present.   Cardiovascular: Normal rate, regular rhythm, normal heart sounds and intact distal pulses.    Pulmonary/Chest: Effort normal and breath sounds normal. No stridor.   Abdominal: Soft. Bowel sounds are normal.   Lymphadenopathy:     She has no cervical adenopathy.   Neurological: She is alert and oriented to person, place, and time.   Skin: Skin is warm. No rash noted. She is not diaphoretic.     Psychiatric: Affect and judgment normal.   Musculoskeletal: Normal range of motion.       Assessment     Patient is a 83 year old female   With DM,HTN,HLD she is here since she cannot move her joints    I think it is  more of weakness and contracture than pain currently   She cannot move any joints at all in the upper and lower part  She cannot raise her arms or get up from a bed  She cannot walk     From what I gather : she had a fall due to knee OA and she became immobile till her family found her with flexion contractures   It might have been a month between the 2 events since before the fall she was very independent and then she became like this    We have an extensive work up at [facility] where xrays and MRIs of the spine and joints : mild deg arthritis  Her CRP showed some elevation  Her RFwas negative  She was VICKI positive and subsets negative   She didn't have a stroke     EMG/NCS was ordered and she cannot do it    I donot think this is a primary joint problem  This is flexion contracture from not using the extremities     1. Flexion contracture joint of multiple sites          New problem     Plan    Deisi was seen today for disease management.    Diagnoses and all orders for this visit:    Flexion contracture joint of multiple sites        Have asked the nursing home to give her aggressive physical therapy everyday so that all her extremities can be mobile again    D/c and rtn prn

## 2019-07-21 PROBLEM — J96.02 ACUTE RESPIRATORY FAILURE WITH HYPOXIA AND HYPERCARBIA: Status: ACTIVE | Noted: 2019-01-01

## 2019-07-21 PROBLEM — J96.01 ACUTE RESPIRATORY FAILURE WITH HYPOXIA AND HYPERCARBIA: Status: ACTIVE | Noted: 2019-01-01

## 2019-07-21 PROBLEM — N39.0 UTI (URINARY TRACT INFECTION): Status: ACTIVE | Noted: 2019-01-01

## 2019-07-21 PROBLEM — Z71.89 GOALS OF CARE, COUNSELING/DISCUSSION: Status: ACTIVE | Noted: 2019-01-01

## 2019-07-21 PROBLEM — N30.00 ACUTE CYSTITIS WITHOUT HEMATURIA: Status: ACTIVE | Noted: 2019-01-01

## 2019-07-21 PROBLEM — E78.5 HYPERLIPIDEMIA: Status: ACTIVE | Noted: 2019-01-01

## 2019-07-21 NOTE — ED NOTES
Pt rolled to right side, cleaned of stool, metiplex  Applied to sacral area, approximate quarter size stage II wound noted. Attempted to call report to floor, nurse not available at this time.

## 2019-07-21 NOTE — HPI
Patient is a 82 yo F with PMH of HTN, DM2, HLD, and LE weakness 2/2 OA who presented to the ED from [facility] for SOB and increased labor of breathing. On 07/21, her nurse noticed that she was gasping for air, with her O2 sats at 93%, placed her on nasal cannula oxygen 5L and her sats came back up to 100%. When EMS transferred her to [facility] ED, she was satting at 100%. Over the last 3 weeks, nursing home staff and family have noticed a decline in her baseline cognitive status. Her normal baseline is bed-bound, she is fed her meals by nursing home staff, and she is able to articulate minimally with family members. Over this decline, she has had a decrease in PO intake and has been fully non-verbal. There was no witnessed aspiration event.     Per family, she has not complained of anything. Family wishes patient to be DNR. In the ED, labs significant for ABG pH 7.32, pCO2 53.8, pO2 70, HCO3 27.8. CT Head with chronic mild microvascular changes, and CXR with no cardiopulmonary changes. EKG showed NSR.    Discussed risks vs benefits of CTA imaging with family. Family wanted to proceed with CTA and if PE is discovered on imaging, aware of the risks vs benefits of treatment of PE, family would like to proceed with treatment of PE.     Palliative Consult was placed for goals of care discussion.

## 2019-07-21 NOTE — ASSESSMENT & PLAN NOTE
-Per nursing home, patient with increased labor of breathing today, with decline over the last 3 weeks, requiring oxygen 5L NC   -Differential diagnosis of Aspiration versus PE  -ABG in ED on 07/21: pH 7.320, pCO2 53.8, pO2 70, HCO3 27.8  -CXR 07/21: No acute cardiopulmonary processes  -  -Lactate 1.3  -Respiratory Viral Panel, Influenza A&B ordered  -duonebs  -CTA, D-dimer  -DNR  -Palliative Consult for goals of care discussion

## 2019-07-21 NOTE — ED NOTES
Pt is resting comfortably on a stretcher. Pt turned with assistance from EBONI Benoit. Pt cleaned of stool. NAD noted, VSS. BP cuff and pulse ox alarms are set. Bed low and locked, side rails up x2. Call light within reach of pt. Daughter, Erum, at bedside. Will continue to monitor.

## 2019-07-21 NOTE — SUBJECTIVE & OBJECTIVE
Past Medical History:   Diagnosis Date    Arthritis of right knee     Contracture of muscle, right upper arm 12/18/2018    Diabetes mellitus     Hyperlipidemia     Hypertension     Primary osteoarthritis of right knee     Pure hypercholesterolemia 10/19/2015    Weakness of both lower extremities 12/18/2018       Past Surgical History:   Procedure Laterality Date    none         Review of patient's allergies indicates:   Allergen Reactions    Pcn [penicillins]        No current facility-administered medications on file prior to encounter.      Current Outpatient Medications on File Prior to Encounter   Medication Sig    acetaminophen (TYLENOL) 325 MG tablet Take 2 tablets (650 mg total) by mouth every 4 (four) hours as needed (Mild 1-3/10 pain or temperature > 101 F).    alendronate (FOSAMAX) 70 MG tablet Take 1 tablet (70 mg total) by mouth every 7 days.    aspirin (ECOTRIN) 81 MG EC tablet Take 81 mg by mouth once daily.    atorvastatin (LIPITOR) 40 MG tablet Take 40 mg by mouth once daily.    carvedilol (COREG) 25 MG tablet Take 1 tablet (25 mg total) by mouth 2 (two) times daily with meals.    cyanocobalamin (VITAMIN B-12) 1000 MCG tablet Take 1 tablet (1,000 mcg total) by mouth once daily.    gabapentin (NEURONTIN) 100 MG capsule Take 1 capsule (100 mg total) by mouth 3 (three) times daily. Take 1 capsule at night for 1 week, then take 1 capsule twice a day for a week, then take 1 capsule 3x/day, thereafter.    HYDROcodone-acetaminophen (NORCO)  mg per tablet Take 1 tablet by mouth every 4 (four) hours as needed.    lisinopril (PRINIVIL,ZESTRIL) 40 MG tablet Take 40 mg by mouth once daily.    magnesium oxide (MAG-OX) 400 mg (241.3 mg magnesium) tablet Take 400 mg by mouth once daily.    metformin (GLUCOPHAGE-XR) 500 MG 24 hr tablet Take 500 mg by mouth 2 (two) times daily with meals.    naproxen (NAPROSYN) 500 MG tablet Take 1 tablet (500 mg total) by mouth 2 (two) times daily with  meals.    naproxen (NAPROSYN) 500 MG tablet Take 1 tablet (500 mg total) by mouth 2 (two) times daily with meals.    NOVOLOG FLEXPEN U-100 INSULIN 100 unit/mL InPn pen     polyethylene glycol (GLYCOLAX) 17 gram PwPk Take 17 g by mouth once daily.    SITagliptin (JANUVIA) 50 MG Tab Take 1 tablet (50 mg total) by mouth once daily.     Family History     Problem Relation (Age of Onset)    Diabetes Sister    Heart failure Sister    Prostate cancer Brother    Stroke Sister        Tobacco Use    Smoking status: Never Smoker    Smokeless tobacco: Never Used   Substance and Sexual Activity    Alcohol use: No     Alcohol/week: 0.0 oz     Frequency: Never    Drug use: No    Sexual activity: Never     Review of Systems   Constitutional: Negative for chills and fever.        ROS per family since patient is non-verbal   HENT: Positive for trouble swallowing.    Respiratory: Positive for shortness of breath. Negative for cough.    Cardiovascular: Negative for chest pain.   Gastrointestinal: Negative for diarrhea and vomiting.   Genitourinary: Negative for difficulty urinating.   Musculoskeletal: Negative for joint swelling.   Skin: Positive for wound. Negative for rash.        Ulcer on her lower back   Neurological: Positive for weakness. Negative for dizziness and light-headedness.   Psychiatric/Behavioral: Positive for confusion.     Objective:     Vital Signs (Most Recent):  Temp: 98 °F (36.7 °C) (07/21/19 1512)  Pulse: 76 (07/21/19 1348)  Resp: (!) 37 (07/21/19 1336)  BP: (!) 149/71 (07/21/19 1348)  SpO2: 100 % (07/21/19 1348) Vital Signs (24h Range):  Temp:  [97.5 °F (36.4 °C)-98 °F (36.7 °C)] 98 °F (36.7 °C)  Pulse:  [72-86] 76  Resp:  [22-37] 37  SpO2:  [93 %-100 %] 100 %  BP: (125-176)/(67-90) 149/71        There is no height or weight on file to calculate BMI.    Physical Exam   Constitutional: No distress.   Cachetic and ill-appearing   HENT:   Head: Normocephalic and atraumatic.   Eyes: EOM are normal.   Neck:  Normal range of motion.   Cardiovascular: Normal rate and regular rhythm.   No murmur heard.  Pulmonary/Chest: Breath sounds normal. No respiratory distress.   Abdominal: There is no tenderness. There is no guarding.   Musculoskeletal:   Contractures of upper extremities and lower extremities   Neurological:   Patient is non-verbal, with diffuse weakness in all extremtites   Skin: Skin is warm and dry. No rash noted.         CRANIAL NERVES     CN III, IV, VI   Extraocular motions are normal.        Significant Labs: All pertinent labs within the past 24 hours have been reviewed.    Significant Imaging: I have reviewed and interpreted all pertinent imaging results/findings within the past 24 hours.

## 2019-07-21 NOTE — ASSESSMENT & PLAN NOTE
-Per nursing home, patient with increased labor of breathing today, with decline over the last 3 weeks, requiring oxygen 5L NC   -Differential diagnosis of Aspiration versus PE  -ABG in ED on 07/21: pH 7.320, pCO2 53.8, pO2 70, HCO3 27.8  -CXR 07/21: No acute cardiopulmonary processes  -  -Lactate 1.3  -Respiratory Viral Panel, Influenza A&B ordered  -duonebs  -CTA, D-dimer  -DNR  -Palliative Consult for goals of care discussion  -if PE, risks vs benefits discussed with family of treatment, family would like to proceed with treatment if PE is found on CTA

## 2019-07-21 NOTE — ED PROVIDER NOTES
Encounter Date: 7/21/2019    SCRIBE #1 NOTE: I, Andrzej Esparza, am scribing for, and in the presence of,  Dr. Awad. I have scribed the following portions of the note - Other sections scribed: HPI, ROS.       History     Chief Complaint   Patient presents with    Shortness of Breath     Pt presents from [facility] with c/o low SpO2 and SOB.      83 y.o. female with history of HTN, diabetes mellitus, hyperlipidemia, and lower extremity weakness presenting with SOB by EMS from the nursing home. Patient's family reports patient recently was having problems speaking over the past 3 weeks and has generally been worsening over the past 2 months. Today she was noted to be hypoxic and having difficulty breathing which is a new and was sent to the ER.  Patients family denies any associated symptoms of fever or chills. Patients family reports of patient not doing as well as she normally does, today patient cannot talk or move and has trouble swallowing. Patient is compliant with her current medications.     Pt reportedly with contractures to arms and legs and has become nonverbal  She is not eating or drinking much   Reportedly getting medications mixed in pudding    History limited due to pt's condition    The history is provided by a relative and medical records. The history is limited by the condition of the patient.     Review of patient's allergies indicates:   Allergen Reactions    Pcn [penicillins]      Past Medical History:   Diagnosis Date    Arthritis of right knee     Contracture of muscle, right upper arm 12/18/2018    Diabetes mellitus     Hyperlipidemia     Hypertension     Primary osteoarthritis of right knee     Pure hypercholesterolemia 10/19/2015    Weakness of both lower extremities 12/18/2018     Past Surgical History:   Procedure Laterality Date    none       Family History   Problem Relation Age of Onset    Heart failure Sister     Prostate cancer Brother     Stroke Sister     Diabetes Sister       Social History     Tobacco Use    Smoking status: Never Smoker    Smokeless tobacco: Never Used   Substance Use Topics    Alcohol use: No     Alcohol/week: 0.0 oz     Frequency: Never    Drug use: No     Review of Systems   Unable to perform ROS: Patient nonverbal   Constitutional: Negative for chills and fever.       Physical Exam     Initial Vitals [07/21/19 1050]   BP Pulse Resp Temp SpO2   (!) 158/90 81 (!) 22 97.5 °F (36.4 °C) 100 %      MAP       --         Physical Exam    Nursing note and vitals reviewed.  Constitutional: No distress.   Cachectic and chronically ill appearing   HENT:   Head: Normocephalic and atraumatic.   Eyes: EOM are normal.   Cardiovascular: Normal rate, regular rhythm and intact distal pulses.   Pulmonary/Chest: Breath sounds normal.   tachypnea   Abdominal: Soft. She exhibits no distension.   Musculoskeletal:   Arms with contractures bilaterally  Legs are contracted,   Limited passive ROM   Neurological: She is alert.   Non-verbal, does not follow commands  Exam is limited   Skin: Skin is warm and dry. Capillary refill takes less than 2 seconds. There is pallor.   Psychiatric:   Difficult to assess due to patient' clinical condition         ED Course   Procedures  Labs Reviewed   CBC W/ AUTO DIFFERENTIAL - Abnormal; Notable for the following components:       Result Value    Mean Corpuscular Hemoglobin Conc 29.8 (*)     Gran # (ANC) 8.6 (*)     Immature Grans (Abs) 0.06 (*)     Gran% 76.6 (*)     Lymph% 17.7 (*)     All other components within normal limits   COMPREHENSIVE METABOLIC PANEL - Abnormal; Notable for the following components:    Glucose 124 (*)     BUN, Bld 37 (*)     Albumin 3.2 (*)     Anion Gap 17 (*)     All other components within normal limits   URINALYSIS, REFLEX TO URINE CULTURE - Abnormal; Notable for the following components:    Appearance, UA Cloudy (*)     Protein, UA 2+ (*)     Ketones, UA 1+ (*)     Occult Blood UA 1+ (*)     All other components  within normal limits    Narrative:     white top  Preferred Collection Type->Urine, Clean Catch   B-TYPE NATRIURETIC PEPTIDE - Abnormal; Notable for the following components:     (*)     All other components within normal limits   URINALYSIS MICROSCOPIC - Abnormal; Notable for the following components:    Bacteria Many (*)     All other components within normal limits    Narrative:     white top  Preferred Collection Type->Urine, Clean Catch   D DIMER, QUANTITATIVE - Abnormal; Notable for the following components:    D-Dimer 6.01 (*)     All other components within normal limits    Narrative:     ADD ON DDIMR AND PT [phone] PER BEAR GARVEY MD    07/21/2019  14:51    ISTAT PROCEDURE - Abnormal; Notable for the following components:    POC PH 7.320 (*)     POC PCO2 53.8 (*)     POC PO2 70 (*)     POC SATURATED O2 92 (*)     POC TCO2 29 (*)     All other components within normal limits   CULTURE, BLOOD   CULTURE, BLOOD   TSH   MAGNESIUM   LACTIC ACID, PLASMA   LACTIC ACID, PLASMA   D DIMER, QUANTITATIVE   PROTIME-INR   PROTIME-INR    Narrative:     ADD ON DDIMR AND PT [phone] PER BEAR GARVEY MD    07/21/2019  14:51    POCT GLUCOSE MONITORING CONTINUOUS     EKG Readings: (Independently Interpreted)   12:22pm  Normal sinus rhythm 79. Normal axis. Normal intervals. Two wave inversion AVL.       Imaging Results          CT Head Without Contrast (Final result)  Result time 07/21/19 12:58:46    Final result by Angus Young MD (07/21/19 12:58:46)                 Impression:      1. No acute major vascular distribution infarct or intracranial hemorrhage.  2. Mild chronic microvascular ischemic changes.      Electronically signed by: Angus Young MD  Date:    07/21/2019  Time:    12:58             Narrative:    EXAMINATION:  CT HEAD WITHOUT CONTRAST    CLINICAL HISTORY:  Confusion/delirium, altered LOC, unexplained;    TECHNIQUE:  5-mm axial images were obtained through the head without  the use of contrast.  Coronal and sagittal reformats were performed.    COMPARISON:  MRI 12/14/2018.    FINDINGS:  No CT findings to suggest an acute major vascular distribution infarct.  There is patchy periventricular white matter hypoattenuation, nonspecific but most suggestive of mild chronic microvascular ischemic changes.  No intracranial hemorrhage.  No hydrocephalus.  No midline shift or mass effect.    Paranasal sinuses and mastoids are clear.  No acute osseous abnormalities.  Globes are symmetric.  Subcutaneous soft tissues are within normal limits.                               X-Ray Chest AP Portable (Final result)  Result time 07/21/19 12:36:01    Final result by Henry Prieto MD (07/21/19 12:36:01)                 Impression:      No acute cardiopulmonary disease      Electronically signed by: Henry Prieto MD  Date:    07/21/2019  Time:    12:36             Narrative:    EXAMINATION:  XR CHEST AP PORTABLE    CLINICAL HISTORY:  hypoxia;    TECHNIQUE:  Single frontal view of the chest was performed.    COMPARISON:  12/26/2018    FINDINGS:  Heart size and pulmonary vessels are normal.  Mediastinum shows aortic atherosclerosis with calcification and tortuosity.  Lungs are expanded and clear without acute consolidation or pleural fluid.  Skeletal structures are intact without acute finding.                                 Medical Decision Making:   History:   Old Medical Records: I decided to obtain old medical records.  Initial Assessment:   Hypoxia and increased work of breathing  No fever or coughing    Differential Diagnosis:   DDX: pneumonia, chf, PE, ACS, pneumothorax, sepsis  Independently Interpreted Test(s):   I have ordered and independently interpreted X-rays - see prior notes.  I have ordered and independently interpreted EKG Reading(s) - see prior notes  Clinical Tests:   Lab Tests: Ordered and Reviewed  Radiological Study: Ordered and Reviewed  Medical Tests: Ordered and  Reviewed  ED Management:  Pt with acute resp failure with hypoxia and hypercarbia and UTI  Discussed with family- son and eldest daughter about her decline for past 2 months and they are agreeable to possible hospice for her  She has contractures to arms and legs, and is non verbal-attempts to communicate but unable  She does have a UTI today, started her on rocephin  cxr without signs of pna, edema, ptx  Lactic normal, no leukocytosis  ABG showed hypercarbia and hypoxia   Also ordered a CTA to evaluate for PE but nurses had difficulty getting an iv and at this time could be covered for PE w lovenox  Family does not want to go to any extreme measures, pt already has a DNR In place  Pt to be placed in observation, she has a new oxygen requirement  Pt team has been to bedside to evaluate patient            Scribe Attestation:   Scribe #1: I performed the above scribed service and the documentation accurately describes the services I performed. I attest to the accuracy of the note.    Attending Attestation:         Attending Critical Care:   Critical Care Times:   ==============================================================  · Total Critical Care Time - exclusive of procedural time: 32 minutes.  ==============================================================              ED Course as of Jul 21 1716   Sun Jul 21, 2019   1307 Pt is tachypneic but is % on RA  Nurse able to collect urine from in and out cath  No sacral decubitus ulcer but there is redness    [GS]      ED Course User Index  [GS] Molly Awad MD     Clinical Impression:       ICD-10-CM ICD-9-CM   1. Acute cystitis without hematuria N30.00 595.0   2. Shortness of breath R06.02 786.05   3. Acute respiratory failure with hypoxia and hypercapnia J96.01 518.81    J96.02                                 Molly Awad MD  07/21/19 1471       Molly Awad MD  07/21/19 8942

## 2019-07-21 NOTE — ED NOTES
Family put pt back on oxygen per NC at 5 L. Explained to family that ER doctor took pt off of oxygen and pt has been ranging from % on room air. Oxygen on at 2 L/nc at this time.

## 2019-07-21 NOTE — ED TRIAGE NOTES
Pt presents from nursing home, family reports that staff was concerned about a change in her breathing and that she was short of breath. Family reports pt is non verbal and has not been eating over the last 2 months.

## 2019-07-22 PROBLEM — Z51.5 PALLIATIVE CARE ENCOUNTER: Status: ACTIVE | Noted: 2019-01-01

## 2019-07-22 NOTE — DISCHARGE SUMMARY
[facility] Hospital Medicine  Discharge Summary      Patient Name: Deisi Cheung  MRN: [id number]  Admission Date: 7/21/2019  Hospital Length of Stay: 0 days  Discharge Date and Time:  07/22/2019 4:51 PM  Attending Physician: Chiara Sarah MD   Discharging Provider: Doroteo Acevedo MD  Primary Care Provider: Santos Mena MD  Hospital Medicine Team: [facility] HOSP MED 2 Doroteo Acevedo MD    HPI:   Patient is a 82 yo F with PMH of HTN, DM2, HLD, and LE weakness 2/2 OA who presented to the ED from [facility] for SOB and increased labor of breathing. On 07/21, her nurse noticed that she was gasping for air, with her O2 sats at 93%, placed her on nasal cannula oxygen 5L and her sats came back up to 100%. When EMS transferred her to [facility] ED, she was satting at 100%. Over the last 3 weeks, nursing home staff and family have noticed a decline in her baseline cognitive status. Her normal baseline is bed-bound, she is fed her meals by nursing home staff, and she is able to articulate minimally with family members. Over this decline, she has had a decrease in PO intake and has been fully non-verbal. There was no witnessed aspiration event.     Per family, she has not complained of anything. Family wishes patient to be DNR. In the ED, labs significant for ABG pH 7.32, pCO2 53.8, pO2 70, HCO3 27.8. CT Head with chronic mild microvascular changes, and CXR with no cardiopulmonary changes. EKG showed NSR.    Discussed risks vs benefits of CTA imaging with family. Family wanted to proceed with CTA and if PE is discovered on imaging, aware of the risks vs benefits of treatment of PE, family would like to proceed with treatment of PE.     Palliative Consult was placed for goals of care discussion.    * No surgery found *      Hospital Course:   Patient was admitted to the hospital medicine service on 07/21 for acute respiratory failure with hypoxia and hypercapnia. In the ED, a CT Head and CXR were both  obtained which were unremarkable. A UA was obtained and showed no signs of UTI. During her workup, there was concerns for aspiration versus pulmonary embolism. A D-dimer was obtained which was elevated. After discussions with family, it was decided amongst her adult children that she not undergo a CTA as they would not like to pursue any aggressive treatments. She is DNR. Palliative Care was consulted who met with the patient and her family and recommended hospice placement. The family was agreeable to inpatient hospice and believed this to be best for the patient. Comfort measures were taken from this point out. She was placed on oxygen for nasal cannula, given morphine 1 mg IV PRN for air hunger/labored respirations, and all medications relating to chronic medical management were discontinued. On , she  while awaiting hospice placement.         Consults:   Consults (From admission, onward)        Status Ordering Provider     Inpatient consult to Palliative Care  Once     Provider:  (Not yet assigned)    Completed AISLINN ZAMUDIO     Inpatient consult to PICC team (NIAS)  Once     Provider:  (Not yet assigned)    Completed CELESTE CARCAMO          No new Assessment & Plan notes have been filed under this hospital service since the last note was generated.  Service: Hospital Medicine    Final Active Diagnoses:    Diagnosis Date Noted POA    PRINCIPAL PROBLEM:  Acute respiratory failure with hypoxia and hypercarbia [J96.01, J96.02] 2019 Yes    UTI (urinary tract infection) [N39.0] 2019 Yes    Palliative care encounter [Z51.5] 2019 Not Applicable    Acute cystitis without hematuria [N30.00] 2019 Yes    Hyperlipidemia [E78.5] 2019 Yes    Goals of care, counseling/discussion [Z71.89] 2019 Not Applicable    Debility [R53.81] 2019 Yes    Contracture of muscle, right upper arm [M62.421] 2018 Yes    Immobility [Z74.09] 2018 Yes    Essential  hypertension [I10] 10/19/2015 Yes    Type 2 diabetes mellitus without complication, without long-term current use of insulin [E11.9] 10/19/2015 Yes      Problems Resolved During this Admission:       Discharged Condition:     Disposition:     Follow Up:    Patient Instructions:   No discharge procedures on file.    Significant Diagnostic Studies: Labs: All labs within the past 24 hours have been reviewed    Pending Diagnostic Studies:     None         Medications:  None (patient  at medical facility)    Indwelling Lines/Drains at time of discharge:   Lines/Drains/Airways     Pressure Ulcer                 Pressure Injury 19 1745 Sacral spine Stage 2 less than 1 day                Time spent on the discharge of patient: 35 minutes  Patient was seen and examined on the date of discharge and determined to be suitable for discharge.         Doroteo Acevedo MD  Department of Hospital Medicine  [facility]

## 2019-07-22 NOTE — CONSULTS
Palliative Care Acknowledgement of Consult - .date    Consult received. Palliative Care Provider: BAUTISTA Jose will touch base with team prior to seeing patient. Full consult to follow.    Thank you for allowing us to be a part of the care of this patient.          Jocelynn Watson LCSW, ACHP-SW

## 2019-07-22 NOTE — ASSESSMENT & PLAN NOTE
Palliative medicine consulted for goals of care. Met with patient and family (son Brandon, daughter Audrey and daughter in law) at bedside.  Palliative medicine introduced.     Impresssion: Ms. Cheung is an 82 yo lady admitted from [facility] with Acute respiratory failure with hypoxia and hypercarbia.  She arouses to verbal stimuli, smiles, is nonverbal, does not follow commands. Appears to be comfortable no facial grimacing or moaning.  Family is at bedside.     Advanced Care Planning   - No advanced directives have been received. Mrs. Cheung unable to completed advanced directive documents  - Next of kin for medical decision making - 5 children,  Brandon Cheung [phone], Erum Cheung [phone], Audrey Cheung [phone], Virginia Cheung [phone], Gerardo Cheung [phone] and Schoen Holden - estranged from family, no known telephone number   -Brandon Cheung states Erum Cheung is the family spokesperson.  As per Brandon the family is in agreement the plan of care should be focused on the patients comfort.    - Palliative medicine will contact Erum Cheung by telephone.   - Family has good understanding of current condition and are not amenable to further diagnostic work up.      Goals of Care  - Family with many questions about end of life care.    - Hospice education verbal and written provided. Family states understanding.    - Family states they are not happy with nursing home care - do not want her to return to [facility].  Family also states not enough support at home for home hospice.    - As patient is not able to eat and family is not amenable to PEG/artifical nutrition,  Family is requesting transition to inpatient hospice.    - Inpatient hospice resources and education provided. Family is hopeful she will meet criteria for inpatient hospice.   Family visit with inpatient hospice agencies before making decisions.     Plan Recommendations  - Family would benefit from continued education regarding    -  Hospice education completed.  Family is reviewing agencies for inpatient hospice.    -

## 2019-07-22 NOTE — NURSING
Patient scheduled for STAT CT since yesterday.  On call checked on status of this order last night.  Patient has #24g IV.  CT requires at least a #20 g for the test.  Very difficult to obtain IV on this patient.. PICC placement scheduled for this morning.  CT should be completed after the PICC placement.  Order also obtained for oxygen.  Patient O2 sat''s were 78% at 4am vitals.  On 3.5 liters O2 sat's currently 97%.  Patient is non-verbal and struggling to breathe. On call aware of patient condition, and up on  floor to assess patient.

## 2019-07-22 NOTE — CARE UPDATE
Rapid Response Respiratory Chart Check     Chart check completed, bedside RN Deniz contacted, Pt is palliative care. no concerns verbalized at this time, instructed to call [phone] for further concerns or assistance.

## 2019-07-22 NOTE — H&P
[facility] Hospital Medicine  History & Physical    Patient Name: Deisi Cheung  MRN: [id number]  Admission Date: 7/21/2019  Attending Physician: Chiara Sarah MD   Primary Care Provider: Santos Mena MD    [facility] Medicine Team: [facility] HOSP MED 2 Doroteo Acevedo MD     Patient information was obtained from patient, relative(s) and ER records.     Subjective:     Principal Problem:Acute respiratory failure with hypoxia and hypercarbia    Chief Complaint:   Chief Complaint   Patient presents with    Shortness of Breath     Pt presents from [facility] with c/o low SpO2 and SOB.         HPI: Patient is a 84 yo F with PMH of HTN, DM2, HLD, and LE weakness 2/2 OA who presented to the ED from [facility] for SOB and increased labor of breathing. On 07/21, her nurse noticed that she was gasping for air, with her O2 sats at 93%, placed her on nasal cannula oxygen 5L and her sats came back up to 100%. When EMS transferred her to [facility] ED, she was satting at 100%. Over the last 3 weeks, nursing home staff and family have noticed a decline in her baseline cognitive status. Her normal baseline is bed-bound, she is fed her meals by nursing home staff, and she is able to articulate minimally with family members. Over this decline, she has had a decrease in PO intake and has been fully non-verbal. There was no witnessed aspiration event.     Per family, she has not complained of anything. Family wishes patient to be DNR. In the ED, labs significant for ABG pH 7.32, pCO2 53.8, pO2 70, HCO3 27.8. CT Head with chronic mild microvascular changes, and CXR with no cardiopulmonary changes. EKG showed NSR.    Discussed risks vs benefits of CTA imaging with family. Family wanted to proceed with CTA and if PE is discovered on imaging, aware of the risks vs benefits of treatment of PE, family would like to proceed with treatment of PE.     Palliative Consult was placed for goals of care discussion.    Past  Medical History:   Diagnosis Date    Arthritis of right knee     Contracture of muscle, right upper arm 12/18/2018    Diabetes mellitus     Hyperlipidemia     Hypertension     Primary osteoarthritis of right knee     Pure hypercholesterolemia 10/19/2015    Weakness of both lower extremities 12/18/2018       Past Surgical History:   Procedure Laterality Date    none         Review of patient's allergies indicates:   Allergen Reactions    Pcn [penicillins]        No current facility-administered medications on file prior to encounter.      Current Outpatient Medications on File Prior to Encounter   Medication Sig    acetaminophen (TYLENOL) 325 MG tablet Take 2 tablets (650 mg total) by mouth every 4 (four) hours as needed (Mild 1-3/10 pain or temperature > 101 F).    alendronate (FOSAMAX) 70 MG tablet Take 1 tablet (70 mg total) by mouth every 7 days.    aspirin (ECOTRIN) 81 MG EC tablet Take 81 mg by mouth once daily.    atorvastatin (LIPITOR) 40 MG tablet Take 40 mg by mouth once daily.    carvedilol (COREG) 25 MG tablet Take 1 tablet (25 mg total) by mouth 2 (two) times daily with meals.    cyanocobalamin (VITAMIN B-12) 1000 MCG tablet Take 1 tablet (1,000 mcg total) by mouth once daily.    gabapentin (NEURONTIN) 100 MG capsule Take 1 capsule (100 mg total) by mouth 3 (three) times daily. Take 1 capsule at night for 1 week, then take 1 capsule twice a day for a week, then take 1 capsule 3x/day, thereafter.    HYDROcodone-acetaminophen (NORCO)  mg per tablet Take 1 tablet by mouth every 4 (four) hours as needed.    lisinopril (PRINIVIL,ZESTRIL) 40 MG tablet Take 40 mg by mouth once daily.    magnesium oxide (MAG-OX) 400 mg (241.3 mg magnesium) tablet Take 400 mg by mouth once daily.    metformin (GLUCOPHAGE-XR) 500 MG 24 hr tablet Take 500 mg by mouth 2 (two) times daily with meals.    naproxen (NAPROSYN) 500 MG tablet Take 1 tablet (500 mg total) by mouth 2 (two) times daily with meals.     naproxen (NAPROSYN) 500 MG tablet Take 1 tablet (500 mg total) by mouth 2 (two) times daily with meals.    NOVOLOG FLEXPEN U-100 INSULIN 100 unit/mL InPn pen     polyethylene glycol (GLYCOLAX) 17 gram PwPk Take 17 g by mouth once daily.    SITagliptin (JANUVIA) 50 MG Tab Take 1 tablet (50 mg total) by mouth once daily.     Family History     Problem Relation (Age of Onset)    Diabetes Sister    Heart failure Sister    Prostate cancer Brother    Stroke Sister        Tobacco Use    Smoking status: Never Smoker    Smokeless tobacco: Never Used   Substance and Sexual Activity    Alcohol use: No     Alcohol/week: 0.0 oz     Frequency: Never    Drug use: No    Sexual activity: Never     Review of Systems   Constitutional: Negative for chills and fever.        ROS per family since patient is non-verbal   HENT: Positive for trouble swallowing.    Respiratory: Positive for shortness of breath. Negative for cough.    Cardiovascular: Negative for chest pain.   Gastrointestinal: Negative for diarrhea and vomiting.   Genitourinary: Negative for difficulty urinating.   Musculoskeletal: Negative for joint swelling.   Skin: Positive for wound. Negative for rash.        Ulcer on her lower back   Neurological: Positive for weakness. Negative for dizziness and light-headedness.   Psychiatric/Behavioral: Positive for confusion.     Objective:     Vital Signs (Most Recent):  Temp: 98.5 °F (36.9 °C) (07/22/19 0432)  Pulse: 82 (07/22/19 0432)  Resp: 17 (07/22/19 0004)  BP: (!) 109/55 (07/22/19 0432)  SpO2: 95 % (07/22/19 0432) Vital Signs (24h Range):  Temp:  [97.5 °F (36.4 °C)-98.5 °F (36.9 °C)] 98.5 °F (36.9 °C)  Pulse:  [72-86] 82  Resp:  [17-37] 17  SpO2:  [93 %-100 %] 95 %  BP: (109-187)/(55-93) 109/55     Weight: 34.9 kg (76 lb 15.1 oz)  Body mass index is 15.03 kg/m².    Physical Exam   Constitutional: No distress.   Cachetic and ill-appearing   HENT:   Head: Normocephalic and atraumatic.   Eyes: EOM are normal.   Neck:  Normal range of motion.   Cardiovascular: Normal rate and regular rhythm.   No murmur heard.  Pulmonary/Chest: Breath sounds normal. No respiratory distress.   Abdominal: There is no tenderness. There is no guarding.   Musculoskeletal:   Contractures of upper extremities and lower extremities   Neurological:   Patient is non-verbal, with diffuse weakness in all extremtites   Skin: Skin is warm and dry. No rash noted.         CRANIAL NERVES     CN III, IV, VI   Extraocular motions are normal.        Significant Labs: All pertinent labs within the past 24 hours have been reviewed.    Significant Imaging: I have reviewed and interpreted all pertinent imaging results/findings within the past 24 hours.    Assessment/Plan:     * Acute respiratory failure with hypoxia and hypercarbia  -Per nursing home, patient with increased labor of breathing today, with decline over the last 3 weeks, requiring oxygen 5L NC   -Differential diagnosis of Aspiration versus PE  -ABG in ED on 07/21: pH 7.320, pCO2 53.8, pO2 70, HCO3 27.8  -CXR 07/21: No acute cardiopulmonary processes  -  -Lactate 1.3  -Respiratory Viral Panel, Influenza A&B ordered  -keena  -CTA, D-dimer  -DNR  -Palliative Consult for goals of care discussion  -if PE, risks vs benefits discussed with family of treatment, family would like to proceed with treatment if PE is found on CTA      UTI (urinary tract infection)  -07/21 in ED: UA 2+protein, 1+leukocytes, 1+blood, many bacteria  -ceftriaxone 1g IV      Goals of care, counseling/discussion  -DNR  -Palliative Consult for goals of care discussion/planning      Hyperlipidemia  -resume home atorvastatin 40 mg      Type 2 diabetes mellitus without complication, without long-term current use of insulin  -LDSSI for NPO patients      Essential hypertension  -home lisinipril 40 mg and carvedilol 25 mg BID        VTE Risk Mitigation (From admission, onward)        Ordered     Place sequential compression device  Until  discontinued      07/21/19 1550     IP VTE HIGH RISK PATIENT  Once      07/21/19 1550             Doroteo Acevedo MD  Department of Hospital Medicine   [facility]

## 2019-07-22 NOTE — NURSING
Reading of resp 45 on visi, verified manually 40. MD called Morphine ordered awaiting RX to verify.   Skin normal color for race, warm, dry and intact. No evidence of rash.

## 2019-07-22 NOTE — DISCHARGE SUMMARY
[facility] Hospital Medicine  Discharge Summary      Patient Name: Deisi Cheung  MRN: [id number]  Admission Date: 7/21/2019  Hospital Length of Stay: 0 days  Discharge Date and Time:  07/22/2019 4:02 PM  Attending Physician: Chiara Sarah MD   Discharging Provider: Doroteo Acevedo MD  Primary Care Provider: Santos Mena MD  Hospital Medicine Team: [facility] HOSP MED 2 Doroteo Acevedo MD    HPI:   Patient is a 84 yo F with PMH of HTN, DM2, HLD, and LE weakness 2/2 OA who presented to the ED from [facility] for SOB and increased labor of breathing. On 07/21, her nurse noticed that she was gasping for air, with her O2 sats at 93%, placed her on nasal cannula oxygen 5L and her sats came back up to 100%. When EMS transferred her to [facility] ED, she was satting at 100%. Over the last 3 weeks, nursing home staff and family have noticed a decline in her baseline cognitive status. Her normal baseline is bed-bound, she is fed her meals by nursing home staff, and she is able to articulate minimally with family members. Over this decline, she has had a decrease in PO intake and has been fully non-verbal. There was no witnessed aspiration event.     Per family, she has not complained of anything. Family wishes patient to be DNR. In the ED, labs significant for ABG pH 7.32, pCO2 53.8, pO2 70, HCO3 27.8. CT Head with chronic mild microvascular changes, and CXR with no cardiopulmonary changes. EKG showed NSR.    Discussed risks vs benefits of CTA imaging with family. Family wanted to proceed with CTA and if PE is discovered on imaging, aware of the risks vs benefits of treatment of PE, family would like to proceed with treatment of PE.     Palliative Consult was placed for goals of care discussion.    * No surgery found *      Hospital Course:   Patient was admitted to the hospital medicine service on 07/21 for acute respiratory failure with hypoxia and hypercapnia. In the ED, a CT Head and CXR were both  obtained which were unremarkable. A UA was obtained and showed no signs of UTI. During her workup, there was concerns for aspiration versus pulmonary embolism. A D-dimer was obtained which was elevated. After discussions with family, it was decided amongst her adult children that she not undergo a CTA as they would not like to pursue any aggressive treatments. She is DNR. Palliative Care was consulted who met with the patient and her family and recommended hospice placement. The family was agreeable to inpatient hospice and believed this to be best for the patient. Comfort measures were taken from this point out. She was placed on oxygen for nasal cannula, given morphine 1 mg IV PRN for air hunger/labored respirations, and all medications relating to chronic medical management were discontinued. On day of discharge, 07/22, she was discharged to inpatient hospice.    Physical Exam on Day of Discharge:  Vital Signs Reviewed  Gen: NAD, lying in bed at her baseline  Pulm: CTA-B, but increased work of breathing noted  Cardiac: RRR, no murmurs  MSK: no edema present  Neuro: Non-verbal, diffuse extremity weakness, debility  Psych: normal behavior     Consults:   Consults (From admission, onward)        Status Ordering Provider     Inpatient consult to Palliative Care  Once     Provider:  (Not yet assigned)    Completed AISLINN ZAMUDIO     Inpatient consult to PICC team ([facility])  Once     Provider:  (Not yet assigned)    Completed CELESTE CARCAMO          No new Assessment & Plan notes have been filed under this hospital service since the last note was generated.  Service: Hospital Medicine    Final Active Diagnoses:    Diagnosis Date Noted POA    PRINCIPAL PROBLEM:  Acute respiratory failure with hypoxia and hypercarbia [J96.01, J96.02] 07/21/2019 Yes    UTI (urinary tract infection) [N39.0] 07/21/2019 Yes    Palliative care encounter [Z51.5] 07/22/2019 Not Applicable    Acute cystitis without hematuria [N30.00]  07/21/2019 Yes    Hyperlipidemia [E78.5] 07/21/2019 Yes    Goals of care, counseling/discussion [Z71.89] 07/21/2019 Not Applicable    Debility [R53.81] 01/01/2019 Yes    Contracture of muscle, right upper arm [M62.421] 12/18/2018 Yes    Immobility [Z74.09] 12/14/2018 Yes    Essential hypertension [I10] 10/19/2015 Yes    Type 2 diabetes mellitus without complication, without long-term current use of insulin [E11.9] 10/19/2015 Yes      Problems Resolved During this Admission:       Discharged Condition: good    Disposition:     Follow Up:    Patient Instructions:   No discharge procedures on file.    Significant Diagnostic Studies: Labs: All labs within the past 24 hours have been reviewed    Pending Diagnostic Studies:     None         Medications:  Reconciled Home Medications:      Medication List      START taking these medications    albuterol-ipratropium 2.5 mg-0.5 mg/3 mL nebulizer solution  Commonly known as:  DUO-NEB  Take 3 mLs by nebulization every 6 (six) hours as needed for Wheezing. Rescue     morphine 10 mg/5 mL solution  Take 1.3 mLs (2.6 mg total) by mouth every 4 (four) hours as needed for Pain.        CHANGE how you take these medications    acetaminophen 325 MG tablet  Commonly known as:  TYLENOL  Take 2 tablets (650 mg total) by mouth every 4 (four) hours as needed.  What changed:  reasons to take this        STOP taking these medications    alendronate 70 MG tablet  Commonly known as:  FOSAMAX     aspirin 81 MG EC tablet  Commonly known as:  ECOTRIN     atorvastatin 40 MG tablet  Commonly known as:  LIPITOR     carvedilol 25 MG tablet  Commonly known as:  COREG     cyanocobalamin 1000 MCG tablet  Commonly known as:  VITAMIN B-12     gabapentin 100 MG capsule  Commonly known as:  NEURONTIN     HYDROcodone-acetaminophen  mg per tablet  Commonly known as:  NORCO     lisinopril 40 MG tablet  Commonly known as:  PRINIVIL,ZESTRIL     magnesium oxide 400 mg (241.3 mg magnesium)  tablet  Commonly known as:  MAG-OX     metFORMIN 500 MG 24 hr tablet  Commonly known as:  GLUCOPHAGE-XR     naproxen 500 MG tablet  Commonly known as:  NAPROSYN     NovoLOG Flexpen U-100 Insulin 100 unit/mL (3 mL) Inpn pen  Generic drug:  insulin aspart U-100     polyethylene glycol 17 gram Pwpk  Commonly known as:  GLYCOLAX     SITagliptin 50 MG Tab  Commonly known as:  JANUVIA            Indwelling Lines/Drains at time of discharge:   Lines/Drains/Airways     Pressure Ulcer                 Pressure Injury 07/21/19 1745 Sacral spine Stage 2 less than 1 day                Time spent on the discharge of patient: 35 minutes  Patient was seen and examined on the date of discharge and determined to be suitable for discharge.         Doroteo Acevedo MD  Department of Hospital Medicine  [facility]

## 2019-07-22 NOTE — PLAN OF CARE
Problem: SLP Goal  Goal: SLP Goal  Speech Language Pathology Goals  Goals expected to be met by 7/29  1. Pt will participate in ongoing swallow assessment.  2. Pt/family education regarding aspiration precautions.

## 2019-07-22 NOTE — PLAN OF CARE
[facility]  Department of Hospital Medicine  [address] [place] [address]  [phone] after hours  [phone] fax    HOSPICE  ORDERS    07/22/2019    Admit to Hospice:   Inpatient Service     Diagnoses:   Active Hospital Problems    Diagnosis  POA    *Acute respiratory failure with hypoxia and hypercarbia [J96.01, J96.02]  Yes     Priority: 1 - High    UTI (urinary tract infection) [N39.0]  Yes     Priority: 2     Palliative care encounter [Z51.5]  Not Applicable    Acute cystitis without hematuria [N30.00]  Yes    Hyperlipidemia [E78.5]  Yes    Goals of care, counseling/discussion [Z71.89]  Not Applicable    Debility [R53.81]  Yes    Contracture of muscle, right upper arm [M62.421]  Yes    Immobility [Z74.09]  Yes    Essential hypertension [I10]  Yes    Type 2 diabetes mellitus without complication, without long-term current use of insulin [E11.9]  Yes      Resolved Hospital Problems   No resolved problems to display.       Hospice Qualifying Diagnoses:        Patient has a life expectancy < 6 months due to:  1) Primary Hospice Diagnosis:  Dementia, debility, weakness  2) Comorbid Conditions Contributing to Decline:  Aspiration    Vital Signs: Routine per Hospice Protocol.    Code Status: DNR    Allergies:   Review of patient's allergies indicates:   Allergen Reactions    Pcn [penicillins]      Tolerated ceftriaxone       Diet: NPO, strict aspiration precautions    Activities: As tolerated    Nursing: Per Hospice Routine.      Routine Skin for Bedridden Patients: Apply moisture barrier cream to all skin folds and   wet areas in perineal area daily and after baths and all bowel movements.    Oxygen: Nasal cannula oxygen as needed to maintain sats > 92%      Medications:      Deisi Cheung   Home Medication Instructions FCO:[id number]    Printed on:07/22/19 1511   Medication Information                      acetaminophen (TYLENOL) 325 MG tablet  Take  2 tablets (650 mg total) by mouth every 4 (four) hours as needed.             albuterol-ipratropium (DUO-NEB) 2.5 mg-0.5 mg/3 mL nebulizer solution  Take 3 mLs by nebulization every 6 (six) hours as needed for Wheezing. Rescue             morphine 10 mg/5 mL solution  Take 1.3 mLs (2.6 mg total) by mouth every 4 (four) hours as needed for Pain.                 _________________________________  Doroteo Acevedo MD  07/22/2019

## 2019-07-22 NOTE — SUBJECTIVE & OBJECTIVE
Interval History:     Past Medical History:   Diagnosis Date    Arthritis of right knee     Contracture of muscle, right upper arm 12/18/2018    Diabetes mellitus     Hyperlipidemia     Hypertension     Primary osteoarthritis of right knee     Pure hypercholesterolemia 10/19/2015    Weakness of both lower extremities 12/18/2018       Past Surgical History:   Procedure Laterality Date    none         Review of patient's allergies indicates:   Allergen Reactions    Pcn [penicillins]      Tolerated ceftriaxone       Medications:  Continuous Infusions:  Scheduled Meds:   aspirin  81 mg Oral Daily    atorvastatin  40 mg Oral Daily    carvedilol  25 mg Oral BID WM    enoxaparin  40 mg Subcutaneous Daily    lisinopril  40 mg Oral Daily    polyethylene glycol  17 g Oral Daily     PRN Meds:acetaminophen, albuterol-ipratropium, Dextrose 10% Bolus, Dextrose 10% Bolus, glucagon (human recombinant), glucose, glucose, HYDROcodone-acetaminophen, insulin aspart U-100, morphine, ondansetron, sodium chloride 0.9%    Family History     Problem Relation (Age of Onset)    Diabetes Sister    Heart failure Sister    Prostate cancer Brother    Stroke Sister        Tobacco Use    Smoking status: Never Smoker    Smokeless tobacco: Never Used   Substance and Sexual Activity    Alcohol use: No     Alcohol/week: 0.0 oz     Frequency: Never    Drug use: No    Sexual activity: Never       Review of Systems   Unable to perform ROS: Patient nonverbal     Objective:     Vital Signs (Most Recent):  Temp: 97.8 °F (36.6 °C) (07/22/19 0748)  Pulse: 77 (07/22/19 0748)  Resp: 17 (07/22/19 0004)  BP: (!) 102/56 (07/22/19 0748)  SpO2: 95 % (07/22/19 0432) Vital Signs (24h Range):  Temp:  [97.8 °F (36.6 °C)-98.5 °F (36.9 °C)] 97.8 °F (36.6 °C)  Pulse:  [74-84] 77  Resp:  [17-25] 17  SpO2:  [94 %-100 %] 95 %  BP: (102-187)/(55-93) 102/56     Weight: 34.9 kg (76 lb 15.1 oz)  Body mass index is 15.03 kg/m².    Review of Symptoms  Symptom  Assessment (ESAS 0-10 scale)   ESAS 0 1 2 3 4 5 6 7 8 9 10   Pain              Dyspnea              Anxiety              Nausea              Depression               Anorexia              Fatigue              Insomnia              Restlessness               Agitation              CAM / Delirium __ --  __  Constipation     __ --  ___  Diarrhea           __ --  __  Bowel Management Plan (BMP): {YES,NO:[id number]}    Appears to have some underlying dementia.  Family states prior to a approximately 3-4 weeks ago, she was still able to recall telephone numbers.  As been in nursing home for 6 months at that time she could not toilet independently or feed her self.      Comments: Unable to complete ESAS    Pain Assessment: appears comfortable, no facial grimacing or moaning.  No apparent shortness of breath     OME in 24 hours: 0    Performance Status: 20    ECOG Performance Status Grade: 3 - Confined to bed or chair 50% of waking hours    Physical Exam   Constitutional: No distress.   Cardiovascular: Normal rate, regular rhythm and normal heart sounds.   Pulmonary/Chest: Effort normal and breath sounds normal. No respiratory distress.   Abdominal: Soft. Bowel sounds are normal.   Neurological:   Arouses to verbal stimuli, oriented to person    Skin: Skin is warm and dry.   Psychiatric: Her behavior is normal.   Nursing note and vitals reviewed.      Significant Labs: All pertinent labs within the past 24 hours have been reviewed.  CBC:   Recent Labs   Lab 07/22/19  0520   WBC 10.45   HGB 11.7*   HCT 40.1   MCV 93   *     BMP:  Recent Labs   Lab 07/22/19  0520   *   *   K 3.6      CO2 26   BUN 44*   CREATININE 0.7   CALCIUM 8.7   MG 1.6     LFT:  Lab Results   Component Value Date    AST 15 07/22/2019    ALKPHOS 77 07/22/2019    BILITOT 0.2 07/22/2019     Albumin:   Albumin   Date Value Ref Range Status   07/22/2019 2.7 (L) 3.5 - 5.2 g/dL Final     Protein:   Total Protein   Date Value Ref Range Status    2019 7.0 6.0 - 8.4 g/dL Final     Lactic acid:   Lab Results   Component Value Date    LACTATE 1.8 2019    LACTATE 1.3 2019       Significant Imaging: I have reviewed all pertinent imaging results/findings within the past 24 hours.    Advance Care Planning   Advanced Directives::  Living Will: No  LaPOST: No  Do Not Resuscitate Status: Yes  Medical Power of : No    Decision-Making Capacity: Family answered questions       Living Arrangements: Lives in [facility]     Psychosocial/Cultural:  ,   19 yrs ago,  for 61 yrs, 7 children- one deceases and one estranged from the family.  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  In later years attended the senior center daily     Spiritual:     F- Ghazala and Belief: Hinduism     I - Importance:   .  C - Community:     A - Address in Care: [address]

## 2019-07-22 NOTE — NURSING
Pt reassessed, resp decreased from [id number], Morphine given was effective ,pt comfortable will cont to monitor

## 2019-07-22 NOTE — CARE UPDATE
Rapid Response Nurse Chart Check     Chart check completed, abnormal VS noted, bedside RNCierra contacted, no concerns   verbalized at this time, plan to  Move pt to inpatient hospice per RN instructed to call [phone] for further concerns or assistance.

## 2019-07-22 NOTE — PLAN OF CARE
Problem: SLP Goal  Goal: SLP Goal  Speech Language Pathology Goals  Goals expected to be met by 7/29  1. Pt will participate in ongoing swallow assessment.       ST swallow evaluation completed.  Recommend pt remain NPO with strict aspiration precautions at this time.  Full session note to follow.     NARA Boswell, CCC-SLP  Speech Language Pathologist  [phone]  7/22/2019

## 2019-07-22 NOTE — PT/OT/SLP EVAL
Speech Language Pathology Evaluation  Bedside Swallow    Patient Name:  Deisi Cheung   MRN:  [id number]  Admitting Diagnosis: Acute respiratory failure with hypoxia and hypercarbia    Recommendations:                 General Recommendations:  Dysphagia therapy  Diet recommendations:  NPO, NPO   Aspiration Precautions: Strict aspiration precautions   General Precautions: Standard, aspiration  Communication strategies:  go to room if call light pushed    History:     Past Medical History:   Diagnosis Date    Arthritis of right knee     Contracture of muscle, right upper arm 12/18/2018    Diabetes mellitus     Hyperlipidemia     Hypertension     Primary osteoarthritis of right knee     Pure hypercholesterolemia 10/19/2015    Weakness of both lower extremities 12/18/2018       Past Surgical History:   Procedure Laterality Date    none         History: Patient was reportedly transferred from [facility] for SOB and increased labor of breathing. Per medical chart, nursing home staff and family have noticed a decline in her baseline cognitive status over the last 3 weeks. Her normal baseline is bed-bound, she is fed her meals by nursing home staff, and she is able to articulate minimally with family members. Over this decline, she has had a decrease in PO intake and has been fully non-verbal. No family present this session to confirm or obtain further information.    Prior diet: Unknown    Subjective     nonverbal    Pain/Comfort:  · Pain Rating 1: 0/10  · Pain Rating Post-Intervention 1: 0/10    Objective:     Oral Musculature Evaluation  · Oral Musculature: unable to assess due to poor participation/comprehension, general weakness, pt with mouth open posture.  Base of tongue pumping noted with breathing.   · Dentition: edentulous  · Volitional Cough: unable to produce cough on command  · Volitional Swallow: absent on command  · Voice Prior to PO Intake: unable to elicit voicing on command    Bedside  Swallow Eval:   Consistencies Assessed:  · Thin liquids (small ice chip presented x1, 1/4 tsp water presented x1)     Oral Phase:   · Pt demonstrated no bolus awareness or attempt to remove bolus from spoon.  When placed in oral cavity, pt may no attempt to perform bolus transit despite cues/stimulation. Mouth remained open throughout attempts.  Both boluses were eventually removed by ST.    Pharyngeal Phase:   · No pharyngeal swallows were triggered.  · High aspiration risk apparent.    Treatment: Education provided regarding ST role, swallow assessment, aspiration risk and recommendations.  Pt made no response to education. No family present.  Results were reviewed with nursing post assessment.  Whiteboard was updated.     Assessment:     Deisi Cheung is a 83 y.o. female with an SLP diagnosis of Dysphagia.     Goals:   Multidisciplinary Problems     SLP Goals        Problem: SLP Goal    Goal Priority Disciplines Outcome   SLP Goal     SLP    Description:  Speech Language Pathology Goals  Goals expected to be met by 7/29  1. Pt will participate in ongoing swallow assessment.                         Plan:     · Patient to be seen:  4 x/week   · Plan of Care expires:  08/20/19  · Plan of Care reviewed with:  patient   · SLP Follow-Up:  Yes       Discharge recommendations:  nursing facility, basic     Time Tracking:     SLP Treatment Date:   07/22/19  Speech Start Time:  0732  Speech Stop Time:  0741     Speech Total Time (min):  9 min    Billable Minutes: Eval Swallow and Oral Function 9    NARA Boswell, CCC-SLP  Speech Language Pathologist  [phone]  7/22/2019

## 2019-07-22 NOTE — PLAN OF CARE
07/22/19 1429   Discharge Assessment   Assessment Type Discharge Planning Assessment   Confirmed/corrected address and phone number on facesheet? Yes   Assessment information obtained from? Medical Record   Expected Length of Stay (days) 2   Communicated expected length of stay with patient/caregiver yes   Prior to hospitilization cognitive status: Unable to Assess   Prior to hospitalization functional status: Completely Dependent   Current cognitive status: Unable to Assess   Current Functional Status: Completely Dependent   Facility Arrived From: [facility]    Lives With facility resident   Able to Return to Prior Arrangements yes   Is patient able to care for self after discharge? Yes   Who are your caregiver(s) and their phone number(s)? Erum [email or web address]-[phone]   Patient's perception of discharge disposition hospice/medical facility   Readmission Within the Last 30 Days no previous admission in last 30 days   Patient currently being followed by outpatient case management? No   Patient currently receives any other outside agency services? No   Equipment Currently Used at Home   (NH equipment )   Do you have any problems affording any of your prescribed medications? No   Is the patient taking medications as prescribed? yes   Does the patient have transportation home? Yes   Transportation Anticipated health plan transportation   Does the patient receive services at the [facility]? No   Discharge Plan A Inpatient Hospice   Discharge Plan B Hospice/home   DME Needed Upon Discharge  none   Patient/Family in Agreement with Plan yes

## 2019-07-22 NOTE — SUBJECTIVE & OBJECTIVE
Past Medical History:   Diagnosis Date    Arthritis of right knee     Contracture of muscle, right upper arm 12/18/2018    Diabetes mellitus     Hyperlipidemia     Hypertension     Primary osteoarthritis of right knee     Pure hypercholesterolemia 10/19/2015    Weakness of both lower extremities 12/18/2018       Past Surgical History:   Procedure Laterality Date    none         Review of patient's allergies indicates:   Allergen Reactions    Pcn [penicillins]        No current facility-administered medications on file prior to encounter.      Current Outpatient Medications on File Prior to Encounter   Medication Sig    acetaminophen (TYLENOL) 325 MG tablet Take 2 tablets (650 mg total) by mouth every 4 (four) hours as needed (Mild 1-3/10 pain or temperature > 101 F).    alendronate (FOSAMAX) 70 MG tablet Take 1 tablet (70 mg total) by mouth every 7 days.    aspirin (ECOTRIN) 81 MG EC tablet Take 81 mg by mouth once daily.    atorvastatin (LIPITOR) 40 MG tablet Take 40 mg by mouth once daily.    carvedilol (COREG) 25 MG tablet Take 1 tablet (25 mg total) by mouth 2 (two) times daily with meals.    cyanocobalamin (VITAMIN B-12) 1000 MCG tablet Take 1 tablet (1,000 mcg total) by mouth once daily.    gabapentin (NEURONTIN) 100 MG capsule Take 1 capsule (100 mg total) by mouth 3 (three) times daily. Take 1 capsule at night for 1 week, then take 1 capsule twice a day for a week, then take 1 capsule 3x/day, thereafter.    HYDROcodone-acetaminophen (NORCO)  mg per tablet Take 1 tablet by mouth every 4 (four) hours as needed.    lisinopril (PRINIVIL,ZESTRIL) 40 MG tablet Take 40 mg by mouth once daily.    magnesium oxide (MAG-OX) 400 mg (241.3 mg magnesium) tablet Take 400 mg by mouth once daily.    metformin (GLUCOPHAGE-XR) 500 MG 24 hr tablet Take 500 mg by mouth 2 (two) times daily with meals.    naproxen (NAPROSYN) 500 MG tablet Take 1 tablet (500 mg total) by mouth 2 (two) times daily with  meals.    naproxen (NAPROSYN) 500 MG tablet Take 1 tablet (500 mg total) by mouth 2 (two) times daily with meals.    NOVOLOG FLEXPEN U-100 INSULIN 100 unit/mL InPn pen     polyethylene glycol (GLYCOLAX) 17 gram PwPk Take 17 g by mouth once daily.    SITagliptin (JANUVIA) 50 MG Tab Take 1 tablet (50 mg total) by mouth once daily.     Family History     Problem Relation (Age of Onset)    Diabetes Sister    Heart failure Sister    Prostate cancer Brother    Stroke Sister        Tobacco Use    Smoking status: Never Smoker    Smokeless tobacco: Never Used   Substance and Sexual Activity    Alcohol use: No     Alcohol/week: 0.0 oz     Frequency: Never    Drug use: No    Sexual activity: Never     Review of Systems   Constitutional: Negative for chills and fever.        ROS per family since patient is non-verbal   HENT: Positive for trouble swallowing.    Respiratory: Positive for shortness of breath. Negative for cough.    Cardiovascular: Negative for chest pain.   Gastrointestinal: Negative for diarrhea and vomiting.   Genitourinary: Negative for difficulty urinating.   Musculoskeletal: Negative for joint swelling.   Skin: Positive for wound. Negative for rash.        Ulcer on her lower back   Neurological: Positive for weakness. Negative for dizziness and light-headedness.   Psychiatric/Behavioral: Positive for confusion.     Objective:     Vital Signs (Most Recent):  Temp: 98.5 °F (36.9 °C) (07/22/19 0432)  Pulse: 82 (07/22/19 0432)  Resp: 17 (07/22/19 0004)  BP: (!) 109/55 (07/22/19 0432)  SpO2: 95 % (07/22/19 0432) Vital Signs (24h Range):  Temp:  [97.5 °F (36.4 °C)-98.5 °F (36.9 °C)] 98.5 °F (36.9 °C)  Pulse:  [72-86] 82  Resp:  [17-37] 17  SpO2:  [93 %-100 %] 95 %  BP: (109-187)/(55-93) 109/55     Weight: 34.9 kg (76 lb 15.1 oz)  Body mass index is 15.03 kg/m².    Physical Exam   Constitutional: No distress.   Cachetic and ill-appearing   HENT:   Head: Normocephalic and atraumatic.   Eyes: EOM are normal.    Neck: Normal range of motion.   Cardiovascular: Normal rate and regular rhythm.   No murmur heard.  Pulmonary/Chest: Breath sounds normal. No respiratory distress.   Abdominal: There is no tenderness. There is no guarding.   Musculoskeletal:   Contractures of upper extremities and lower extremities   Neurological:   Patient is non-verbal, with diffuse weakness in all extremtites   Skin: Skin is warm and dry. No rash noted.         CRANIAL NERVES     CN III, IV, VI   Extraocular motions are normal.        Significant Labs: All pertinent labs within the past 24 hours have been reviewed.    Significant Imaging: I have reviewed and interpreted all pertinent imaging results/findings within the past 24 hours.

## 2019-07-22 NOTE — NURSING
Entered  room for re-assessment, pt noted with agonal breathing, HR 26 on visi with resp 5. Faint Heart tones noted and verified by charge nurse. Medical team notified as well as stanley. Brother at bedside notified family at this time

## 2019-07-22 NOTE — HOSPITAL COURSE
Patient was admitted to the hospital medicine service on  for acute respiratory failure with hypoxia and hypercapnia. In the ED, a CT Head and CXR were both obtained which were unremarkable. A UA was obtained and showed no signs of UTI. During her workup, there was concerns for aspiration versus pulmonary embolism. A D-dimer was obtained which was elevated. After discussions with family, it was decided amongst her adult children that she not undergo a CTA as they would not like to pursue any aggressive treatments. She is DNR. Palliative Care was consulted who met with the patient and her family and recommended hospice placement. The family was agreeable to inpatient hospice and believed this to be best for the patient. Comfort measures were taken from this point out. She was placed on oxygen for nasal cannula, given morphine 1 mg IV PRN for air hunger/labored respirations, and all medications relating to chronic medical management were discontinued. On , she  while awaiting hospice placement.

## 2019-07-22 NOTE — PLAN OF CARE
Problem: Skin Injury Risk Increased  Goal: Skin Health and Integrity  Outcome: Ongoing (interventions implemented as appropriate)  No acute events throughout night. Pt non-verbal.   Unable to express needs.  Safety maintained.  Difficult IV stick.  Scheduled for PICC placement today.  CT not done last night, patient needs at least a 20 g IV.  (unable to obtain).    Bed in low position, call  light in reach.    Will continue to monitor

## 2019-07-24 LAB
ENTEROVIRUS: NOT DETECTED
HUMAN BOCAVIRUS: NOT DETECTED
HUMAN CORONAVIRUS, COMMON COLD VIRUS: NOT DETECTED
INFLUENZA A - H1N1-09: NOT DETECTED
PARAINFLUENZA: NOT DETECTED
RVP - ADENOVIRUS: NOT DETECTED
RVP - HUMAN METAPNEUMOVIRUS (HMPV): NOT DETECTED
RVP - INFLUENZA A: NOT DETECTED
RVP - INFLUENZA B: NOT DETECTED
RVP - RESPIRATORY SYNCTIAL VIRUS (RSV) A: NOT DETECTED
RVP - RESPIRATORY VIRAL PANEL, SOURCE: NORMAL
RVP - RHINOVIRUS: NOT DETECTED

## 2019-07-25 LAB
BACTERIA BLD CULT: ABNORMAL

## 2020-03-09 NOTE — HPI
HPI obtained from chart review.        Mrs. Cheung is a 82 yo lady with PMH of HTN, DM2, HLD, and LE weakness 2/2 OA . She presented to [facility] from  [facility]  With c/o SOB and increased labor of breathing.   7/21/19 Nursing home nurse noticed  she was gasping for air, with her O2 sats at 93%, placed her on nasal cannula oxygen 5L and her sats came back up to 100%.     On transfer  Oxygen sat  100%. Family and nursing home staff report over past three weeks a decline in  baseline cognitive status. Functionally her baseline is  bed-bound and total feed.  Minimal verbal communication. During this period she   has had a decrease in PO intake and has been fully non-verbal.      Speech pathology following - recommended strict aspiration precautions.  Palliative medicine consulted for goals of care.     Normal vision: sees adequately in most situations; can see medication labels, newsprint

## 2020-06-15 NOTE — PLAN OF CARE
CASTILLO following for DC needs. CASTILLO in communication with AYESHA.    CASTILLO spoke to Yohan at [facility]. Yohan stated that the family will have to be private pay if the patient goes to [facility] due to her not qualifying for SNF and not having medicaid at this time.     CASTILLO spoke to Veena at [facility]. Veena stated that if the family brings in the 3 last bank statements and proof of income she can determine if the patient will qualify for long term medicaid for nursing home placement.     CASTILLO spoke to Kayli and notified her of the above. Kayli stated that she will call Veena and make an appt to meet with her tomorrow (Friday) to bring the requested documents. Kayli stated that she is going to talk to the family about taking the patient home because she does not want her to get depressed staying in the hospital. CASTILLO asked Kayli if they can care for the patient at home and Kayli stated that they would have to make it work. CASTILLO asked when the family can bring the patient home and Kayli stated that they can have things in place by Saturday.     Plan for DC is [facility] vs. Home on Saturday.     Maria Del Rosario Umanzor, CASTILLO  [facility]  [id number]     Initial (On Arrival)

## 2021-11-12 NOTE — PROGRESS NOTES
Central tele confirmed signal mx 43  Report given to treva on 2south bed 204-01   Hospital Medicine   Progress Note     Team: [facility] HOSP MED Z Tessie Mai MD   Admit Date: 12/14/2018   ANNITA 1/12/2019   Code status: Full Code   Principal Problem: Debility     Interval hx: No new issues, continue to await placement in senior living NH. See SW note. She is very eager for placement.     ROS    Respiratory: negative for cough, shortness of breath   Cardiovascular: negative for chest discomfort, palpitations   Gastrointestinal: negative for nausea or vomiting, abdominal pain, constipation, diarrhea     PEx   Temp:  [97.1 °F (36.2 °C)-99.4 °F (37.4 °C)]   Pulse:  [68-77]   Resp:  [18-20]   BP: (124-133)/(61-88)   SpO2:  [96 %-98 %]      I & O (Last 24H):   No intake or output data in the 24 hours ending 01/12/19 0942    General Appearance: thin, elderly lady, supine  Heart: regular rate and rhythm   Respiratory: Normal respiratory effort, no crackles   Abdomen: Soft, non-tender; bowel sounds active   Skin: intact. IV sites ok   Neurologic: No focal numbness or weakness   Mental status: Alert, oriented x 4, affect appropriate, memory intact   Right UE: TTP, swollen with contracture of R fingers and elbow, very limited active movement of R arm or hand, some passive ROM but limited by pain      Recent Labs   Lab 01/10/19  2141 01/11/19  0830 01/11/19  1233 01/11/19  1713 01/11/19  2143 01/12/19  0825   POCTGLUCOSE 221* 83 168* 76 138* 85            Active Hospital Problems    Diagnosis  POA    *Debility [R53.81]  Yes    Immobility [Z74.09]  Yes     Priority: 1 - High    Essential hypertension [I10]  Yes     Priority: 2     Type 2 diabetes mellitus without complication, without long-term current use of insulin [E11.9]  Yes     Priority: 3     Vitamin B12 deficiency anemia [D51.9]  Yes     Priority: 4     Pure hypercholesterolemia [E78.00]  Yes     Priority: 5     Contracture of muscle, right upper arm [M62.421]  Yes    Weakness of both lower extremities [R29.898]  Yes    Gait disturbance [R26.9]  Yes  "   Localized edema [R60.0]  Yes      Resolved Hospital Problems   No resolved problems to display.        Overview:  84 YO lady with HTN, HLD, Type 2 diabetes on oral hypoglycemics and osteoarthritis who presented with complaint of persistant right arm weakness and right hand swelling for past 5 months. She denies injury or trauma. She has weakness of the entire RUE. She has difficulty with active shoulder, elbow, wrist, and finger motion. She has flexion contracture of the fingers. She notes edema of the hand which is improved with elevation. She is able to passively extend them to some degree however limited by pain.  She denies notable numbness or tingling. Confusion, trauma, swelling in any other extremities any rashes, fever or chills, neck pain,  hx of known CVA. She was evaluated by Orthopedics in past with  prior x-rays which are unremarkable. So she was sent to Neurology for further evaluation and recommended an EMG. On presentation to ED, she had an MRI brain done that showed " No evidence of acute intracranial pathology.Moderate generalized cerebral volume loss and moderate chronic microvascular ischemic changes" and US of RUE that showed "No thrombus in central veins of the right upper extremity."   Of note pt has been bed bound and not ambulatory for months (no clear inciting events) she has worked with PT and OT in past for her knee pain but has progressively has become bed bound and relies on her family for her ADLs. Family reports that they can no longer meet all her need and are requesting admission for placement to nursing home.      Hospital Course:  Mrs. Cheung was admitted for nursing home placement and Neurology evaluation as to cause of progressive immobility and loss of use of right arm and leg weakness. H/H at baseline with anemia. B12 low, started on supplement. Patient hypertensive in absence of pain and started on Norvasc 10 mg po daily to treat. Neurology consulted, MRI of C-spine " "ordered and returned with only mild DJD of cervical spine but no spinal stenosis and does not explain patient's neurologic symptoms. EMG and NCV ordered on 12/18 to further work-up cause of progressive weakness. PT/OT consulted, recommended SNF placement and  working with family on placement choices. Pain to right arm improved with placement of splint to right hand and forearm to help with contractures. CPK normal at 61. Repeat MRI of thoracic and cervical spine with contrast done on 12/19 as per Neurology recs to make sure no structural cause of neurologic symptoms and repeat MRI unremarkable for any significant disc disease or spinal stenosis to explain patient's neurologic symptoms. Neurology states NCV/EMG can be done as outpatient as low suspicion for motor neuron disease.      PT/OT evaluated the patient.  We tried for SNF placement, but due to the more chronic nature of her disease, [facility] has denied SNF.  PEER to PEER done without change.  Pt will either need to go home with family and 24 hour care or [facility] nursing care.  Social work assisting.   Medically ready for discharge, awaiting placement.  [facility] denied SNF as pt is at baseline and not improving with therapy (per peer to peer by Dr Mai). Will need [facility] nursing home or home care with home health and 24 hour care. Social work working with family. Family unable to take care of her at home, so pursing [facility] NH placement.     Assessment and Plan for problems addressed today:     Immobility     Gait disturbance  Localized edema of right hand  Contracture of muscle, right upper arm  Weakness of both lower extremities     - Condition unchanged since admit.   - Patient with progressive immobility and weak over past few months starting five months ago, worsening over past two months. Patient with flexion contracture of right hand, along with right hand edema and weakness as well as "tingling" pain in right elbow over the past few months " "that has recently worsened. Has seen orthopedics as outpatient with unremarkable Radiology.  - EMG/NCV ordered on 12/18 to further evaluate cause of RUE contractures and leg weakness as per Neurology recs but Neurology states can be done as outpatient as low suspicion for motor neuron disease.  - MRI brain done showed "No evidence of acute intracranial pathology"  - US of RUE showed "No thrombus in central veins of the right upper extremity."  - MRI of cervical spine "Mild DJD of C-spine but no stenosis". Repeat MRI of thoracic and cervical spine on 12/19 with contrast with no stenosis or significant disc disease so no structural cause for right arm or leg weakness.   - CPK normal at 61  - Neurology following and appreciate recs and state no further inpatient evaluation needed and EMG/NCV can be done as outpatient and has EMG/NCV ordered for 2/8/2019.   - Pain medication PRN and topical capsaicin to right upper extremity. Pain related to contracture sand improved with placement of right arm splint.   - Edema to right hand and arm related to immobility and improved with splint.   - awaing NH versus home care       Essential hypertension     · Patient's blood pressure controlled.    · Goal for blood pressure is SBP < 140 and DBP < 90 as patient > or = 60 years of age and patient is diabetic based on JNC 8 guidelines.   · Norvasc 10 mg po daily stopped ( new med started on this admit for HTN) on 12/22 as patient with SBP 93. Patient continued on home meds of Lisinopril 40 mg po daily (home med) and Coreg 25 mg po BID (home med) to treat and BP has been stable on this regimen after Norvasc stopped on 12/22 with no further hypotension. Patient to be discharge to NH on this regimen.  · Plan is to monitor patient's blood pressure routinely while patient is hospitalized.       Type 2 diabetes mellitus without complication, without long-term current use of insulin     Controlled in past 24 hours. Holding home meds of " "Sitagliptin and Metformin while patient in hospital and treating with insulin but plan to resume on discharge to NH SNF.  · Last HgA1C 6 % and at goal.  · Plan is to continue Levemir 6 units twice a day and Novolog 4 units with meals to treat in hospital but will discharge on Metformin and Sitagliptin  · SSI and accucheks  · Target pre-meal glucose goal is <140 with all random glucoses <180 in non-critically ill patient.      Vitamin B12 deficiency anemia     - Controlled and stable.   - 11/23: B12 175, Ferritin 90, Transferrin 185, Sat Fe: 13, Fe: 35, TIBC: 274  - Patient started on B12 supplement at Vitamin B12 1000 mcg po daily as outpatient in 11/2018 and repeat Vitamin B12 level on this admit 1062. Patient to continue oral Vitamin B12 supplementation on hospital discharge.   - Folate level normal.       Pure hypercholesterolemia     Controlled. Continue Lipitor 40 mg po daily to treat on discharge.       Localized edema     R hand edema and weakness, pain improved  Unclear exact etiology  MRI brain ana maria howed "No evidence of acute intracranial pathology"  US showed "No thrombus in central veins of the right upper extremity."  Pt was supposed to have outpt EMG done by neurology  PT and OT  Pain medication PRN  Topical capsaicin           DVT PPx: lovenox    Discharge plan and follow up: await group home NH placement. Medically ready for d/c.    Tessie Mai MD  Hospital Medicine Staff  [phone] pager      "

## 2023-06-18 NOTE — HPI
83 y.o. Female with HTN, HLD, DMII and arthritis presented to ED 12/14/18 with RUE swelling and pain worse over the past few weeks. Patient reports RUE weakness in all muscles and decreased mobility for the past 5 months. Her right hand has been swollen for at least one month with significant wrist and hand pain. Says pain feels muscular rather than involving joints and aching in quality. Rates pain 8/10 and sometimes has tingling. She also reported decreased ambulation that began before RUE issues and has been nonambulatory for the past few months. She tried home OT, which helped mobility somewhat. Was seen in hand clinic 11/30/18 for RUE issues and found to have flexion contractures of fingers. Xray R shoulder and hand were performing showing osseous demineralization, degenerative changes at interphalangeal joints, soft tissue swelling at wrist. Home health OT, finger extension splint, Neurology referral and outpatient EMG recommended. She is scheduled to see Dr. Ivan Bajwa 1/29/18 and has EMG scheduled 2/8/18 with hand clinic f/u with Dr. Aquino 2/26/18, but presented to ED sooner due to significant pain. Patient lives alone, but has children who check in on her regularly and assist with meals and medications.                        show

## 2024-10-15 NOTE — ED NOTES
Placed patient on the bedpan. Pt unable to urinate at this time. Pt alert. Pt had a BM.     Reviewed UDS and YONI. Both updated and appropriate. Refill appropriate.

## 2025-04-28 NOTE — LETTER
I contacted the patient through itravel.  She isnot taking Vascepa (Icosapent ethyl).  With her triglycerides of over 5000, I have prescribed Icosapent ethyl 2000 mg bid.     May 2, 2019      Ricardo Urban MD  [address] [facility]  [address]  [facility] [address]           [facility] - Rheumatology  [address]  [place] [address]  Phone: [phone]  Fax: [phone]          Patient: Deisi Cheung   MR Number: [id number]   YOB: 1935   Date of Visit: 5/2/2019       Dear Dr. Ricardo Urban:    Thank you for referring Deisi Cheung to me for evaluation. Attached you will find relevant portions of my assessment and plan of care.    If you have questions, please do not hesitate to call me. I look forward to following Deisi Cheung along with you.    Sincerely,    Avila Payan MD    Enclosure  CC:  No Recipients    If you would like to receive this communication electronically, please contact [email or web address] or [phone] to request more information on [employer] access.    For providers and/or their staff who would like to refer a patient to [facility], please contact us through our one-stop-shop provider referral line, [facility], at [phone].    If you feel you have received this communication in error or would no longer like to receive these types of communications, please [email or web address] [email or web address]